# Patient Record
Sex: MALE | Race: WHITE | NOT HISPANIC OR LATINO | Employment: OTHER | URBAN - METROPOLITAN AREA
[De-identification: names, ages, dates, MRNs, and addresses within clinical notes are randomized per-mention and may not be internally consistent; named-entity substitution may affect disease eponyms.]

---

## 2017-01-01 ENCOUNTER — LAB CONVERSION - ENCOUNTER (OUTPATIENT)
Dept: OTHER | Facility: OTHER | Age: 77
End: 2017-01-01

## 2017-01-01 LAB
CULTURE RESULT (HISTORICAL): NORMAL
CULTURE RESULT (HISTORICAL): NORMAL
SHIGA TOXIN TEST (HISTORICAL): NORMAL

## 2017-01-02 ENCOUNTER — GENERIC CONVERSION - ENCOUNTER (OUTPATIENT)
Dept: OTHER | Facility: OTHER | Age: 77
End: 2017-01-02

## 2017-01-11 ENCOUNTER — GENERIC CONVERSION - ENCOUNTER (OUTPATIENT)
Dept: OTHER | Facility: OTHER | Age: 77
End: 2017-01-11

## 2017-03-07 ENCOUNTER — GENERIC CONVERSION - ENCOUNTER (OUTPATIENT)
Dept: OTHER | Facility: OTHER | Age: 77
End: 2017-03-07

## 2017-04-11 ENCOUNTER — GENERIC CONVERSION - ENCOUNTER (OUTPATIENT)
Dept: OTHER | Facility: OTHER | Age: 77
End: 2017-04-11

## 2017-05-16 ENCOUNTER — GENERIC CONVERSION - ENCOUNTER (OUTPATIENT)
Dept: OTHER | Facility: OTHER | Age: 77
End: 2017-05-16

## 2017-06-06 ENCOUNTER — GENERIC CONVERSION - ENCOUNTER (OUTPATIENT)
Dept: OTHER | Facility: OTHER | Age: 77
End: 2017-06-06

## 2017-09-28 ENCOUNTER — GENERIC CONVERSION - ENCOUNTER (OUTPATIENT)
Dept: OTHER | Facility: OTHER | Age: 77
End: 2017-09-28

## 2017-10-19 ENCOUNTER — ALLSCRIPTS OFFICE VISIT (OUTPATIENT)
Dept: OTHER | Facility: OTHER | Age: 77
End: 2017-10-19

## 2017-10-20 NOTE — PROGRESS NOTES
Assessment  1  Tick bite (919 4,E906 4) (W57 XXXA)    Plan  Impetigo    · Doxycycline Hyclate 100 MG Oral Capsule; 1 BID    Discussion/Summary    Told patient to wait regarding antibiotics and observe area  Educated patient on signs to return to office including fever, arthralgias  Chief Complaint  tick bite 2 days ago      History of Present Illness  HPI: 78year old male presents to office complaining of tick bite to left calf just under knee  States that he noticed tick bite 2 days ago just after he realized he had a rash and itching  Denies fever, arthralgias  Has been applying Bactroban to area  Review of Systems    Constitutional: no fever or chills, feels well, no tiredness, no recent weight loss or weight gain  Cardiovascular: no complaints of slow or fast heart rate, no chest pain, no palpitations, no leg claudication or lower extremity edema  Respiratory: no complaints of shortness of breath, no wheezing or cough, no dyspnea on exertion, no orthopnea or PND  Gastrointestinal: no complaints of abdominal pain, no constipation, no nausea or vomiting, no diarrhea or bloody stools  Musculoskeletal: no complaints of arthralgia, no myalgia, no joint swelling or stiffness, no limb pain or swelling  Integumentary: as noted in HPI  Neurological: no complaints of headache, no confusion, no numbness or tingling, no dizziness or fainting  Active Problems  1  Back pain (724 5) (M54 9)   2  Greater trochanteric bursitis of left hip (726 5) (M70 62)   3  Hypertension (401 9) (I10)   4  Impetigo (684) (L01 00)   5  Infectious enteritis, unspecified infectious agent (009 0) (A09)   6  Mixed hyperlipidemia (272 2) (E78 2)   7  Numbness and tingling of leg (782 0) (R20 0,R20 2)   8  Screening for AAA (abdominal aortic aneurysm) (V81 2) (Z13 6)   9  Screening PSA (prostate specific antigen) (V76 44) (Z12 5)   10   Swimmer's ear of both sides, unspecified chronicity (380 12) (H60 333)    Past Medical History  1  History of Acute medial meniscal tear, left, initial encounter (836 0) (S83 242A)   2  History of Acute medial meniscal tear, left, subsequent encounter (V58 89,836 0)   (S83 242D)   3  History of Aftercare following surgery of the musculoskeletal system (V58 78) (Z47 89)   4  History of Effusion of knee joint, left (719 06) (M25 462)   5  H/O prostate cancer (V10 46) (Z85 46)   6  Hypertension (401 9) (I10)   7  History of Knee pain, left (719 46) (M25 562)   8  History of Myalgia and myositis (729 1)   9  History of Numbness of left lower extremity (782 0) (R20 0)   10  History of Preoperative clearance (V72 84) (Z01 818)   11  History of Strain of neck muscle, initial encounter (847 0) (S16 1XXA)  Active Problems And Past Medical History Reviewed: The active problems and past medical history were reviewed and updated today  Family History  Father    1  Family history of Coronary artery disease involving native coronary artery of native heart,   angina presence unspecified  Family History    2  Denied: Family history of substance abuse   3  No family history of mental disorder    Social History   · Former smoker (J73 58) (Q74 880)   · Quit 35+ years ago    Surgical History  1  History of Bladder Surgery   2  History of Cholecystectomy   3  History of Prostatectomy Radical    Current Meds   1  Advil 200 MG Oral Tablet; Take 1-2 Tablet(s) every 4-6 hours as needed; Therapy: 81Big2014 to (Last Rx:17Lsj3557)  Requested for: 27JNH2789 Ordered   2  Aspirin 81 MG TABS; Therapy: (Recorded:96Phe2711) to Recorded   3  Cephalexin 500 MG Oral Capsule; TAKE 1 CAPSULE 3 TIMES DAILY; Therapy: 71UKY8582 to (21 )  Requested for: 45CJO0265; Last   Rx:41Izj1195 Ordered   4  CoQ10 CAPS; Therapy: (Recorded:56Jbr6756) to Recorded   5  Fish Oil OIL; Therapy: (Recorded:53Ymk4449) to Recorded   6  Lisinopril 5 MG Oral Tablet; TAKE 1 TABLET DAILY AS DIRECTED;    Therapy: 08RTA5869 to (Evaluate:88Trj3775)  Requested for: 33NHC8157; Last   Rx:53Jkc0161 Ordered   7  Mupirocin Calcium 2 % External Cream; APPLY THIN LAYER TO AFFECTED AREA(S) 3   TIMES DAILY; Therapy: 26IED2304 to (Last Burnie Costain)  Requested for: 25PHK7444 Ordered   8  Neomycin-Polymyxin-HC 1 % Otic Solution; INSTILL 3 DROPS IN AFFECTED EAR(S)   3-4 TIMES DAILY; Therapy: 37JSB1807 to (Last Burnie Costain)  Requested for: 74VXJ2762 Ordered   9  Pantethine Plus TABS; Therapy: (Recorded:15Isq0458) to Recorded   10  Probiotic Product TABS; Therapy: (Recorded:27Nov2015) to Recorded   11  Red Yeast Rice CAPS; Therapy: (Recorded:27Nov2015) to Recorded   12  Super B-50 TABS; Therapy: (Recorded:27Nov2015) to Recorded    Allergies  1  No Known Drug Allergies    Vitals   Recorded: 71UVZ0946 08:48AM   Temperature 98 1 F   Heart Rate 54   Respiration 16   Systolic 916   Diastolic 80   Height 5 ft 9 in   Weight 236 lb    BMI Calculated 34 85   BSA Calculated 2 21   O2 Saturation 97     Physical Exam    Constitutional   General appearance: No acute distress, well appearing and well nourished  Pulmonary   Respiratory effort: No increased work of breathing or signs of respiratory distress  Auscultation of lungs: Clear to auscultation, equal breath sounds bilaterally, no wheezes, no rales, no rhonci  Cardiovascular   Auscultation of heart: Normal rate and rhythm, normal S1 and S2, without murmurs  Examination of extremities for edema and/or varicosities: Normal     Musculoskeletal   Gait and station: Normal     Skin   Skin and subcutaneous tissue: Abnormal  -- 1 cm circular area of erythema with central punctum from tick bite  Results/Data  PHQ-2 Adult Depression Screening 19Oct2017 09:12AM User, Ahs     Test Name Result Flag Reference   PHQ-2 Adult Depression Score 0     Over the last two weeks, how often have you been bothered by any of the following problems?   Little interest or pleasure in doing things: Not at all - 0  Feeling down, depressed, or hopeless: Not at all - 0   PHQ-2 Adult Depression Screening Negative         Future Appointments    Date/Time Provider Specialty Site   04/16/2018 09:15 AM Wild Vail MD Urology [address]     Signatures   Electronically signed by : Yolanda aFlcon MD; Oct 19 2017  9:21AM EST                       (Author)

## 2017-12-13 ENCOUNTER — ALLSCRIPTS OFFICE VISIT (OUTPATIENT)
Dept: OTHER | Facility: OTHER | Age: 77
End: 2017-12-13

## 2017-12-21 ENCOUNTER — ALLSCRIPTS OFFICE VISIT (OUTPATIENT)
Dept: OTHER | Facility: OTHER | Age: 77
End: 2017-12-21

## 2017-12-22 NOTE — PROGRESS NOTES
Assessment   1  Acute otitis media (382 9) (H66 90)    Plan   Acute otitis media    · Amoxicillin 500 MG Oral Capsule; TAKE 1 CAPSULE 3 TIMES DAILY UNTIL GONE  PMH: Strain of neck muscle, initial encounter    · Advil 200 MG Oral Tablet  Unlinked    · CoQ10 CAPS   · Fish Oil OIL   · Pantethine Plus TABS   · Red Yeast Rice CAPS   · Super B-50 TABS    Discussion/Summary      Fungus flushed--OE/OM---amox + tamar otic--f/u if cont  Chief Complaint   left ear pain/ trouble hearing--was give RX for drops, started yesterday--pt states eat feels clogged--had dental implants x 1week ago      History of Present Illness   HPI: see cc      Review of Systems        Constitutional: no fever or chills, feels well, no tiredness, no recent weight loss or weight gain  ENT: as noted in HPI  Cardiovascular: no complaints of slow or fast heart rate, no chest pain, no palpitations, no leg claudication or lower extremity edema  Respiratory: no complaints of shortness of breath, no wheezing or cough, no dyspnea on exertion, no orthopnea or PND  Active Problems   1  Back pain (724 5) (M54 9)   2  Hypertension (401 9) (I10)   3  Mixed hyperlipidemia (272 2) (E78 2)   4  Need for vaccination (V05 9) (Z23)   5  Screening for AAA (abdominal aortic aneurysm) (V81 2) (Z13 6)   6  Tick bite (919 4,E906 4) (W57 XXXA)   7  Urinary incontinence with continuous leakage (788 37) (N39 45)    Past Medical History   1  History of Acute medial meniscal tear, left, initial encounter (836 0) (S83 242A)   2  History of Acute medial meniscal tear, left, subsequent encounter (V58 89,836 0)     (S83 242D)   3  History of Aftercare following surgery of the musculoskeletal system (V58 78) (Z47 89)   4  History of Effusion of knee joint, left (719 06) (M25 462)   5  H/O prostate cancer (V10 46) (Z85 46)   6  Hypertension (401 9) (I10)   7  History of Knee pain, left (719 46) (M25 562)   8  History of Myalgia and myositis (729 1)   9   History of Numbness of left lower extremity (782 0) (R20 0)   10  History of Preoperative clearance (V72 84) (Z01 818)   11  History of Strain of neck muscle, initial encounter (847 0) (S16 1XXA)  Active Problems And Past Medical History Reviewed: The active problems and past medical history were reviewed and updated today  Family History   Father    1  Family history of Coronary artery disease involving native coronary artery of native heart,     angina presence unspecified  Family History    2  Denied: Family history of substance abuse   3  No family history of mental disorder    Social History    · Former smoker (U23 47) (N66 316)   · Quit 35+ years ago    Surgical History   1  History of Bladder Surgery   2  History of Cholecystectomy   3  History of Prostatectomy Radical    Current Meds    1  Advil 200 MG Oral Tablet; Take 1-2 Tablet(s) every 4-6 hours as needed; Therapy: 76Dof1009 to (Last Rx:74Tfx0673)  Requested for: 64BQM4995 Ordered   2  Aspirin 81 MG TABS; Therapy: (Recorded:40Gre4528) to Recorded   3  CoQ10 CAPS; Therapy: (Recorded:27Nov2015) to Recorded   4  Fish Oil OIL; Therapy: (Recorded:76Wne6935) to Recorded   5  Lisinopril 5 MG Oral Tablet; TAKE 1 TABLET DAILY AS DIRECTED; Therapy: 63IHP4307 to (Evaluate:28Axc7771)  Requested for: 93DTU0198; Last     Rx:64Eeg4951 Ordered   6  Mupirocin Calcium 2 % External Cream; APPLY THIN LAYER TO AFFECTED AREA(S) 3     TIMES DAILY; Therapy: 10HOS8526 to (Last Agee [place]es Run)  Requested for: 02PTD2843 Ordered   7  Neomycin-Polymyxin-HC 1 % Otic Solution; INSTILL 3 DROPS IN AFFECTED EAR(S)     3-4 TIMES DAILY; Therapy: 84FET0194 to (Last Agee [place]es [place])  Requested for: 09DFE7452 Ordered   8  Pantethine Plus TABS; Therapy: (Recorded:41Vbh6709) to Recorded   9  Probiotic Product TABS; Therapy: (Recorded:27Nov2015) to Recorded   10  Red Yeast Rice CAPS; Therapy: (Recorded:27Nov2015) to Recorded   11   Super B-50 TABS; Therapy: (Recorded:59Gny4950) to Recorded    Allergies   1  No Known Drug Allergies    Vitals    Recorded: 34Zoj2749 09:44AM   Heart Rate 70   Systolic 355   Diastolic 84   Height 5 ft 9 in   Weight 242 lb    BMI Calculated 35 74   BSA Calculated 2 24   O2 Saturation 97     Physical Exam        Constitutional      General appearance: No acute distress, well appearing and well nourished  Ears, Nose, Mouth, and Throat      External inspection of ears and nose: Normal        Otoscopic examination: Abnormal  -- L canal w/ aspergillosis/OM L  Nasal mucosa, septum, and turbinates: Normal without edema or erythema            Future Appointments      Date/Time Provider Specialty Site   04/16/2018 09:15 AM Mk Villeda MD Urology [address]     Signatures    Electronically signed by : Rishi Wynne MD; Dec 21 2017 10:19AM EST                       (Author)

## 2018-01-12 ENCOUNTER — ALLSCRIPTS OFFICE VISIT (OUTPATIENT)
Dept: OTHER | Facility: OTHER | Age: 78
End: 2018-01-12

## 2018-01-12 VITALS
TEMPERATURE: 98.1 F | OXYGEN SATURATION: 97 % | RESPIRATION RATE: 16 BRPM | SYSTOLIC BLOOD PRESSURE: 120 MMHG | WEIGHT: 236 LBS | DIASTOLIC BLOOD PRESSURE: 80 MMHG | BODY MASS INDEX: 34.96 KG/M2 | HEART RATE: 54 BPM | HEIGHT: 69 IN

## 2018-01-13 NOTE — RESULT NOTES
Verified Results  (1) COMPREHENSIVE METABOLIC PANEL 28ZBD0560 84:78XG Traneric Victor Manuel   REPORT COMMENT:  PATIENT REFUSED SOME TESTING; PATIENT ENCOURAGED TO RETURN  Test Name Result Flag Reference   GLUCOSE 95 mg/dL  65-99   Fasting reference interval   UREA NITROGEN (BUN) 20 mg/dL  7-25   CREATININE 0 92 mg/dL  0 70-1 18   For patients >52years of age, the reference limit  for Creatinine is approximately 13% higher for people  identified as -American  eGFR NON-AFR  AMERICAN 82 mL/min/1 73m2  > OR = 60   eGFR AFRICAN AMERICAN 95 mL/min/1 73m2  > OR = 60   BUN/CREATININE RATIO   1-92   NOT APPLICABLE (calc)   SODIUM 138 mmol/L  135-146   POTASSIUM 4 2 mmol/L  3 5-5 3   CHLORIDE 107 mmol/L     CARBON DIOXIDE 26 mmol/L  20-31   CALCIUM 9 1 mg/dL  8 6-10 3   PROTEIN, TOTAL 6 1 g/dL  6 1-8 1   ALBUMIN 4 0 g/dL  3 6-5 1   GLOBULIN 2 1 g/dL (calc)  1 9-3 7   ALBUMIN/GLOBULIN RATIO 1 9 (calc)  1 0-2 5   BILIRUBIN, TOTAL 0 6 mg/dL  0 2-1 2   ALKALINE PHOSPHATASE 58 U/L     AST 17 U/L  10-35   ALT 24 U/L  9-46     (Q) LIPID PANEL WITH REFLEX TO DIRECT LDL 39HBG6520 09:55AM Traneric Rush     Test Name Result Flag Reference   CHOLESTEROL, TOTAL 200 mg/dL  125-200   HDL CHOLESTEROL 44 mg/dL  > OR = 40   TRIGLICERIDES 225 mg/dL  <150   LDL-CHOLESTEROL 132 mg/dL (calc) H <130   Desirable range <100 mg/dL for patients with CHD or  diabetes and <70 mg/dL for diabetic patients with  known heart disease  CHOL/HDLC RATIO 4 5 (calc)  < OR = 5 0   NON HDL CHOLESTEROL 156 mg/dL (calc)     Target for non-HDL cholesterol is 30 mg/dL higher than   LDL cholesterol target

## 2018-01-13 NOTE — PROGRESS NOTES
Assessment   1  Chronic congestion of paranasal sinus (771 9) (J32 9)    Plan   Chronic congestion of paranasal sinus    · Fluticasone Propionate 50 MCG/ACT Nasal Suspension; USE 1 SPRAY IN EACH    NOSTRIL ONCE DAILY    Discussion/Summary      Start Flonase as directed  Use Ocean Nasal Rinse 3x day as discussed  Call if Symptoms worsen  Call or return with any problems or concerns  Possible side effects of new medications were reviewed with the patient/guardian today  The treatment plan was reviewed with the patient/guardian  The patient/guardian understands and agrees with the treatment plan      Chief Complaint   ears clogged/trouble hearing--both--was seen few weeks ago/given abx      History of Present Illness   HPI: Was seen 3 weeks ago for Left ear pain and clogged  Right ear clogged x 2 days  Review of Systems        Constitutional: no fever or chills, feels well, no tiredness, no recent weight loss or weight gain  ENT: right ear clogged, but-- as noted in HPI  Cardiovascular: no complaints of slow or fast heart rate, no chest pain, no palpitations, no leg claudication or lower extremity edema  Respiratory: no complaints of shortness of breath, no wheezing or cough, no dyspnea on exertion, no orthopnea or PND  Gastrointestinal: no complaints of abdominal pain, no constipation, no nausea or vomiting, no diarrhea or bloody stools  Genitourinary: no complaints of dysuria or incontinence, no hesitancy, no nocturia, no genital lesion, no inadequacy of penile erection  Musculoskeletal: no complaints of arthralgia, no myalgia, no joint swelling or stiffness, no limb pain or swelling  Integumentary: no complaints of skin rash or lesion, no itching or dry skin, no skin wounds  Neurological: no complaints of headache, no confusion, no numbness or tingling, no dizziness or fainting  ROS reviewed  Active Problems   1  Acute otitis media (566 9) (K45 02)   2   Back pain (724 5) (M54 9)   3  Hypertension (401 9) (I10)   4  Mixed hyperlipidemia (272 2) (E78 2)   5  Need for vaccination (V05 9) (Z23)   6  Screening for AAA (abdominal aortic aneurysm) (V81 2) (Z13 6)   7  Tick bite (919 4,E906 4) (W57 XXXA)   8  Urinary incontinence with continuous leakage (788 37) (N39 45)    Past Medical History   1  History of Acute medial meniscal tear, left, initial encounter (836 0) (S83 242A)   2  History of Acute medial meniscal tear, left, subsequent encounter (V58 89,836 0)     (S83 242D)   3  History of Aftercare following surgery of the musculoskeletal system (V58 78) (Z47 89)   4  History of Effusion of knee joint, left (719 06) (M25 462)   5  H/O prostate cancer (V10 46) (Z85 46)   6  Hypertension (401 9) (I10)   7  History of Knee pain, left (719 46) (M25 562)   8  History of Myalgia and myositis (729 1)   9  History of Numbness of left lower extremity (782 0) (R20 0)   10  History of Preoperative clearance (V72 84) (Z01 818)   11  History of Strain of neck muscle, initial encounter (847 0) (S16 1XXA)  Active Problems And Past Medical History Reviewed: The active problems and past medical history were reviewed and updated today  Family History   Father    1  Family history of Coronary artery disease involving native coronary artery of native heart,     angina presence unspecified  Family History    2  Denied: Family history of substance abuse   3  No family history of mental disorder  Family History Reviewed: The family history was reviewed and updated today  Social History    · Former smoker (G52 84) (M92 035)   · Quit 35+ years ago  The social history was reviewed and updated today  Surgical History   1  History of Bladder Surgery   2  History of Cholecystectomy   3  History of Prostatectomy Radical  Surgical History Reviewed: The surgical history was reviewed and updated today  Current Meds    1   Amoxicillin 500 MG Oral Capsule; TAKE 1 CAPSULE 3 TIMES DAILY UNTIL GONE;     Therapy: 94AQY3432 to (Evaluate:26Dtl9770)  Requested for: 90Naf2268; Last     Rx:80Ecs0087 Ordered   2  Aspirin 81 MG TABS; Therapy: (Recorded:47Ayg8648) to Recorded   3  Lisinopril 5 MG Oral Tablet; TAKE 1 TABLET DAILY AS DIRECTED; Therapy: 70DPH4845 to (Evaluate:25Mgu1013)  Requested for: 09KCN2149; Last     Rx:01Pso8535 Ordered   4  Mupirocin Calcium 2 % External Cream; APPLY THIN LAYER TO AFFECTED AREA(S) 3     TIMES DAILY; Therapy: 96FAZ0485 to (Last Salena Dubs)  Requested for: 25JDO7470 Ordered   5  Neomycin-Polymyxin-HC 1 % Otic Solution; INSTILL 3 DROPS IN AFFECTED EAR(S)     3-4 TIMES DAILY; Therapy: 39TMJ2767 to (Last Salena Dubs)  Requested for: 20MAQ0603 Ordered   6  Probiotic Product TABS; Therapy: (Recorded:27Nov2015) to Recorded     The medication list was reviewed and updated today  Allergies   1  No Known Drug Allergies    Vitals    Recorded: 12Jan2018 01:41PM   Heart Rate 74   Systolic 670   Diastolic 70   Height 5 ft 9 in   Weight 242 lb    BMI Calculated 35 74   BSA Calculated 2 24   O2 Saturation 97     Physical Exam        Constitutional      General appearance: No acute distress, well appearing and well nourished  Eyes      Conjunctiva and lids: No swelling, erythema, or discharge  Pupils and irises: Equal, round and reactive to light  Ears, Nose, Mouth, and Throat      External inspection of ears and nose: Normal        Otoscopic examination: Tympanic membrance translucent with normal light reflex  Canals patent without erythema  Nasal mucosa, septum, and turbinates: Normal without edema or erythema  Pulmonary      Respiratory effort: No increased work of breathing or signs of respiratory distress  Auscultation of lungs: Clear to auscultation, equal breath sounds bilaterally, no wheezes, no rales, no rhonci         Cardiovascular      Auscultation of heart: Normal rate and rhythm, normal S1 and S2, without murmurs  Abdomen      Abdomen: Non-tender, no masses  Liver and spleen: No hepatomegaly or splenomegaly  Lymphatic      Palpation of lymph nodes in neck: No lymphadenopathy         Musculoskeletal      Gait and station: Normal        Psychiatric      Orientation to person, place and time: Normal        Mood and affect: Normal           Future Appointments      Date/Time Provider Specialty Site   04/16/2018 09:15 AM Sondra Monreal MD Urology [address]     Signatures    Electronically signed by : Jaime Garcia; Jan 12 2018  2:12PM EST                       (Author)     Electronically signed by : Odell Logan MD; Jan 12 2018  2:44PM EST

## 2018-01-13 NOTE — RESULT NOTES
Message   Recorded as Task   Date: 01/02/2017 09:09 AM, Created By: Breezy Mondragon   Task Name: Call Patient with results   Assigned To: Ashutosh Holloway   Regarding Patient: Stefano Siegel, Status: Active   CommentJeannie Guess - 02 Jan 2017 9:09 AM     Patient Phone: [phone]    Stool samples are all negative for infectious causes  Would be concerned about malabsorption  If diarrhea is still persisting should probably see GI for further evaluation     Sherita Hi - 02 Jan 2017 10:22 AM     TASK EDITED  PT AWARE- GI NUMBER PROVIDED        Signatures   Electronically signed by : Edwin Harrington, ; Jan 2 2017 10:22AM EST                       (Author)

## 2018-01-15 NOTE — RESULT NOTES
Verified Results  CLOSTRIDIUM DIFFICILE TOXIN/GDH W/REFL TO PCR 75QDT3327 10:38AM Jose Jaimes   REPORT COMMENT:  SPLIT 12/28/2016 FROM [id number]  FASTING:UNKNOWN     Test Name Result Flag Reference   CLOSTRIDIUM DIFFICILE TOXIN/GDH W/REFL TO PCR      CLOSTRIDIUM DIFFICILE TOXIN/GDH W/REFL TO PCR         MICRO NUMBER:      [id number]    TEST STATUS:       FINAL    SPECIMEN SOURCE:   STOOL    SPECIMEN QUALITY:  ADEQUATE    GDH ANTIGEN:       Not Detected    TOXIN A AND B:     Not Detected    COMMENT:           No toxigenic C  difficile detected

## 2018-01-18 NOTE — RESULT NOTES
Verified Results  (1) CBC/PLT/DIFF 65OMU2038 01:25PM Jaime Carter   REPORT COMMENT:  FASTING:UNKNOWN  COLLECTION KIT GIVEN TO PATIENT  PATIENT ADVISED TO RETURN       Test Name Result Flag Reference   WHITE BLOOD CELL COUNT 4 3 Thousand/uL  3 8-10 8   RED BLOOD CELL COUNT 4 58 Million/uL  4 20-5 80   HEMOGLOBIN 14 6 g/dL  13 2-17 1   HEMATOCRIT 42 8 %  38 5-50 0   MCV 93 6 fL  80 0-100 0   MCH 32 0 pg  27 0-33 0   MCHC 34 2 g/dL  32 0-36 0   RDW 13 1 %  11 0-15 0   PLATELET COUNT 014 Thousand/uL  140-400   MPV 8 3 fL  7 5-11 5   ABSOLUTE NEUTROPHILS 2606 cells/uL  8137-5983   ABSOLUTE LYMPHOCYTES 1213 cells/uL  850-3900   ABSOLUTE MONOCYTES 391 cells/uL  200-950   ABSOLUTE EOSINOPHILS 73 cells/uL     ABSOLUTE BASOPHILS 17 cells/uL  0-200   NEUTROPHILS 60 6 %     LYMPHOCYTES 28 2 %     MONOCYTES 9 1 %     EOSINOPHILS 1 7 %     BASOPHILS 0 4 %

## 2018-01-22 VITALS
OXYGEN SATURATION: 98 % | BODY MASS INDEX: 34.36 KG/M2 | RESPIRATION RATE: 16 BRPM | HEIGHT: 69 IN | DIASTOLIC BLOOD PRESSURE: 84 MMHG | WEIGHT: 232 LBS | SYSTOLIC BLOOD PRESSURE: 140 MMHG | HEART RATE: 74 BPM

## 2018-01-23 VITALS
BODY MASS INDEX: 35.84 KG/M2 | HEART RATE: 70 BPM | DIASTOLIC BLOOD PRESSURE: 84 MMHG | WEIGHT: 242 LBS | OXYGEN SATURATION: 97 % | SYSTOLIC BLOOD PRESSURE: 134 MMHG | HEIGHT: 69 IN

## 2018-01-23 VITALS
DIASTOLIC BLOOD PRESSURE: 70 MMHG | SYSTOLIC BLOOD PRESSURE: 134 MMHG | HEIGHT: 69 IN | WEIGHT: 242 LBS | OXYGEN SATURATION: 97 % | BODY MASS INDEX: 35.84 KG/M2 | HEART RATE: 74 BPM

## 2018-01-23 VITALS
RESPIRATION RATE: 16 BRPM | OXYGEN SATURATION: 96 % | HEART RATE: 66 BPM | BODY MASS INDEX: 35.84 KG/M2 | SYSTOLIC BLOOD PRESSURE: 130 MMHG | WEIGHT: 242 LBS | HEIGHT: 69 IN | DIASTOLIC BLOOD PRESSURE: 90 MMHG

## 2018-01-23 NOTE — PROGRESS NOTES
Assessment    1  Encounter for preventive health examination (V70 0) (Z00 00)   2  Need for vaccination (V05 9) (Z23)    Plan  Hypertension, Mixed hyperlipidemia    · (1) COMPREHENSIVE METABOLIC PANEL; Status:Active; Requested for:16Imp0535;    · (1) LIPID PANEL FASTING W DIRECT LDL REFLEX; Status:Active; Requested  for:44Seb8497;   Need for vaccination    · Prevnar 13 Intramuscular Suspension    Discussion/Summary    Declines flu shot  Update Advanced Directive- bring copy for office record  Cancer screens are current  Cardiovascular screening and counseling: due for a lipid panel  Diabetes screening and counseling: due for blood glucose  Colorectal cancer screening and counseling: screening is current  Prostate cancer screening and counseling: screening is current  Osteoporosis screening and counseling: screening not indicated  Abdominal aortic aneurysm screening and counseling: the risks and benefits of screening were discussed  Immunizations: the patient declines the influenza vaccination, the lifetime pneumococcal vaccine has been completed, the risks and benefits of the Zostavax vaccine were discussed with the patient and Tdap vaccination status is unknown  Advance Directive Planning: complete and up to date  Advice and education were given regarding nutrition (non-diabetic) and seat belt use  He was referred to urology  Patient Discussion: plan discussed with the patient, follow-up visit needed in one year  Chief Complaint  annual wellness    History of Present Illness  The patient is being seen for the subsequent annual wellness visit  Medicare Screening and Risk Factors   Hospitalizations: no previous hospitalizations  Once per lifetime medicare screening tests: AAA screening [place] has not yet been done  Medicare Screening Tests Risk Questions   Abdominal aortic aneurysm risk assessment: none indicated  Osteoporosis risk assessment:  and over 48years of age  Drug and Alcohol Use: The patient is a former cigarette smoker and quit smoking 1974  The patient reports rare alcohol use  He has never used illicit drugs  Diet and Physical Activity: Current diet includes well balanced meals, 1 servings of fruit per day, 1 servings of vegetables per day, 1 servings of meat per day, 1 servings of whole grains per day, 1 servings of dairy products per day and 3 cups of coffee per day  He exercises 3 times per week  Exercise: strength training 3 hours per week  Functional Ability/Level of Safety: Hearing is normal bilaterally  He does not use a hearing aid  The patient is currently able to do activities of daily living without limitations, able to do instrumental activities of daily living without limitations, able to participate in social activities without limitations and able to drive without limitations  Fall risk factors: The patient fell 0 times in the past 12 months  Injury History: polypharmacy and urinary incontinence  Advance Directives: Advance directives: living will, but no durable power of  for health care directives and no advance directives  Co-Managers and Medical Equipment/Suppliers: See Patient Care Team      Patient Care Team    Care Team Member Role Specialty Office Number   Justyna Lindsay MD  Orthopedic Surgery [phone]     Active Problems    1  Back pain (724 5) (M54 9)   2  Hypertension (401 9) (I10)   3  Mixed hyperlipidemia (272 2) (E78 2)   4  Screening for AAA (abdominal aortic aneurysm) (V81 2) (Z13 6)   5  Tick bite (919 4,E906 4) [facility])    Past Medical History    1  History of Acute medial meniscal tear, left, initial encounter (836 0) (S83 242A)   2  History of Acute medial meniscal tear, left, subsequent encounter (V58 89,836 0)   (S83 242D)   3  History of Aftercare following surgery of the musculoskeletal system (V58 78) (Z47 89)   4  History of Effusion of knee joint, left (719 06) (M25 462)   5   H/O prostate cancer (V10 46) (Z85 46)   6  Hypertension (401 9) (I10)   7  History of Knee pain, left (719 46) (M25 562)   8  History of Myalgia and myositis (729 1)   9  History of Numbness of left lower extremity (782 0) (R20 0)   10  History of Preoperative clearance (V72 84) (Z01 818)   11  History of Strain of neck muscle, initial encounter (847 0) (S16 1XXA)    Surgical History    1  History of Bladder Surgery   2  History of Cholecystectomy   3  History of Prostatectomy Radical    Family History  Father    1  Family history of Coronary artery disease involving native coronary artery of native heart,   angina presence unspecified  Family History    2  Denied: Family history of substance abuse   3  No family history of mental disorder    Social History    · Former smoker (E12 13) (P79 005)  The social history was reviewed and updated today  Current Meds   1  Advil 200 MG Oral Tablet; Take 1-2 Tablet(s) every 4-6 hours as needed; Therapy: 26Xjc9563 to (Last Rx:47Kff9045)  Requested for: 30BFG7602 Ordered   2  Aspirin 81 MG TABS; Therapy: (Recorded:50Nfv6325) to Recorded   3  CoQ10 CAPS; Therapy: (Recorded:27Nov2015) to Recorded   4  Fish Oil OIL; Therapy: (Recorded:81Ocq2975) to Recorded   5  Lisinopril 5 MG Oral Tablet; TAKE 1 TABLET DAILY AS DIRECTED; Therapy: 07AWC3217 to (Evaluate:93Pem8833)  Requested for: 13EJZ2128; Last   Rx:45Bqm0415 Ordered   6  Mupirocin Calcium 2 % External Cream; APPLY THIN LAYER TO AFFECTED AREA(S) 3   TIMES DAILY; Therapy: 48VFC9315 to (Last Paula Riggers)  Requested for: 65ZLG8567 Ordered   7  Neomycin-Polymyxin-HC 1 % Otic Solution; INSTILL 3 DROPS IN AFFECTED EAR(S) 3-4   TIMES DAILY; Therapy: 58MAX5162 to (Last Paula Riggers)  Requested for: 19CSN1627 Ordered   8  Pantethine Plus TABS; Therapy: (Recorded:50Rtg7248) to Recorded   9  Probiotic Product TABS; Therapy: (Recorded:27Nov2015) to Recorded   10  Red Yeast Rice CAPS; Therapy: (Recorded:27Nov2015) to Recorded   11  Super B-50 TABS; Therapy: (Recorded:68Fhr3445) to Recorded    The medication list was reviewed and updated today  Allergies    1  No Known Drug Allergies    Immunizations  Influenza --- Margy Bhavani: Temporarily Deferred: Pt refuses, As of: 64LXK9153, Defer for 1 Years   PPSV --- Margy Beckham: 17-May-2007     Vitals  Signs   Recorded: 47Ljj4672 09:04AM   Heart Rate: 66  Respiration: 16  Systolic: 023  Diastolic: 90  Height: 5 ft 9 in  Weight: 242 lb   BMI Calculated: 35 74  BSA Calculated: 2 24  O2 Saturation: 96    Results/Data  PHQ-2 Adult Depression Screening 13Isc3656 10:05AM User, Ahs     Test Name Result Flag Reference   PHQ-2 Adult Depression Score 0     Over the last two weeks, how often have you been bothered by any of the following problems? Little interest or pleasure in doing things: Not at all - 0  Feeling down, depressed, or hopeless: Not at all - 0   PHQ-2 Adult Depression Screening Negative         Health Management  History of Screening PSA (prostate specific antigen)   (1) PSA (SCREEN) (Dx V76 44 Screen for Prostate Cancer); every 1 month; Next Due: 47FZI3621;   Overdue    Future Appointments    Date/Time Provider Specialty Site   04/16/2018 09:15 AM José Miguel Bryson MD Urology [address]     Signatures   Electronically signed by : Ventura López MD; Dec 13 2017 12:04PM EST                       (Author)

## 2018-01-30 DIAGNOSIS — L98.9 SKIN ABNORMALITIES: Primary | ICD-10-CM

## 2018-01-30 RX ORDER — MUPIROCIN CALCIUM 20 MG/G
CREAM TOPICAL 3 TIMES DAILY
COMMUNITY
Start: 2017-09-28 | End: 2018-01-30 | Stop reason: SDUPTHER

## 2018-01-30 RX ORDER — MUPIROCIN CALCIUM 20 MG/G
CREAM TOPICAL 3 TIMES DAILY
Qty: 15 G | Refills: 2 | Status: SHIPPED | OUTPATIENT
Start: 2018-01-30 | End: 2018-03-22 | Stop reason: HOSPADM

## 2018-03-19 DIAGNOSIS — I10 ESSENTIAL HYPERTENSION: Primary | ICD-10-CM

## 2018-03-19 RX ORDER — LISINOPRIL 5 MG/1
1 TABLET ORAL DAILY
COMMUNITY
Start: 2015-09-21 | End: 2018-03-19 | Stop reason: SDUPTHER

## 2018-03-19 RX ORDER — LISINOPRIL 5 MG/1
5 TABLET ORAL DAILY
Qty: 90 TABLET | Refills: 1 | Status: SHIPPED | OUTPATIENT
Start: 2018-03-19 | End: 2018-03-22 | Stop reason: SDUPTHER

## 2018-03-20 LAB
ALBUMIN SERPL-MCNC: 4.3 G/DL (ref 3.6–5.1)
ALBUMIN/GLOB SERPL: 1.9 (CALC) (ref 1–2.5)
ALP SERPL-CCNC: 63 U/L (ref 40–115)
ALT SERPL-CCNC: 22 U/L (ref 9–46)
AST SERPL-CCNC: 17 U/L (ref 10–35)
BILIRUB SERPL-MCNC: 1.1 MG/DL (ref 0.2–1.2)
BUN SERPL-MCNC: 23 MG/DL (ref 7–25)
BUN/CREAT SERPL: NORMAL (CALC) (ref 6–22)
CALCIUM SERPL-MCNC: 9.4 MG/DL (ref 8.6–10.3)
CHLORIDE SERPL-SCNC: 105 MMOL/L (ref 98–110)
CHOLEST SERPL-MCNC: 188 MG/DL
CHOLEST/HDLC SERPL: 4.5 (CALC)
CO2 SERPL-SCNC: 27 MMOL/L (ref 20–31)
CREAT SERPL-MCNC: 1.15 MG/DL (ref 0.7–1.18)
GLOBULIN SER CALC-MCNC: 2.3 G/DL (CALC) (ref 1.9–3.7)
GLUCOSE SERPL-MCNC: 95 MG/DL (ref 65–99)
HDLC SERPL-MCNC: 42 MG/DL
LDLC SERPL CALC-MCNC: 125 MG/DL (CALC)
NONHDLC SERPL-MCNC: 146 MG/DL (CALC)
POTASSIUM SERPL-SCNC: 4.7 MMOL/L (ref 3.5–5.3)
PROT SERPL-MCNC: 6.6 G/DL (ref 6.1–8.1)
SL AMB EGFR AFRICAN AMERICAN: 71 ML/MIN/1.73M2
SL AMB EGFR NON AFRICAN AMERICAN: 61 ML/MIN/1.73M2
SODIUM SERPL-SCNC: 139 MMOL/L (ref 135–146)
TRIGL SERPL-MCNC: 105 MG/DL

## 2018-03-22 ENCOUNTER — OFFICE VISIT (OUTPATIENT)
Dept: FAMILY MEDICINE CLINIC | Facility: CLINIC | Age: 78
End: 2018-03-22
Payer: MEDICARE

## 2018-03-22 VITALS
HEART RATE: 88 BPM | BODY MASS INDEX: 35.55 KG/M2 | DIASTOLIC BLOOD PRESSURE: 72 MMHG | HEIGHT: 69 IN | RESPIRATION RATE: 18 BRPM | WEIGHT: 240 LBS | SYSTOLIC BLOOD PRESSURE: 118 MMHG

## 2018-03-22 DIAGNOSIS — I10 ESSENTIAL HYPERTENSION: Primary | ICD-10-CM

## 2018-03-22 PROCEDURE — 99213 OFFICE O/P EST LOW 20 MIN: CPT | Performed by: FAMILY MEDICINE

## 2018-03-22 RX ORDER — LISINOPRIL 5 MG/1
5 TABLET ORAL DAILY
Qty: 90 TABLET | Refills: 3 | Status: SHIPPED | OUTPATIENT
Start: 2018-03-22 | End: 2018-12-04 | Stop reason: SDUPTHER

## 2018-03-22 RX ORDER — AMPICILLIN TRIHYDRATE 250 MG
CAPSULE ORAL
COMMUNITY
End: 2021-11-08

## 2018-03-22 RX ORDER — VITAMIN B COMPLEX
TABLET ORAL
COMMUNITY
End: 2021-11-08

## 2018-03-22 NOTE — PROGRESS NOTES
[address] [employer]         NAME: David Whitehead is a 70 y o  male  : 1940    MRN: [id number]  DATE: 2018  TIME: 8:29 AM    Assessment and Plan   Essential hypertension [I10]  1  Essential hypertension           Patient Instructions     Patient Instructions   Same meds          Chief Complaint     Chief Complaint   Patient presents with    Hypertension     REFILL MEDS/REVIEW LABS         History of Present Illness       STABLE htn        Review of Systems   Review of Systems   Constitutional: Negative  Negative for chills, fatigue, fever and unexpected weight change  HENT: Negative  Negative for congestion, ear pain, rhinorrhea, sinus pain and sore throat  Eyes: Negative  Negative for pain, discharge and redness  Respiratory: Negative  Negative for cough, chest tightness, shortness of breath and wheezing  Cardiovascular: Negative  Negative for chest pain, palpitations and leg swelling  Gastrointestinal: Negative for abdominal pain, nausea and vomiting  Endocrine: Negative  Genitourinary: Negative  Negative for dysuria and hematuria  Musculoskeletal: Negative  Negative for arthralgias and myalgias  Allergic/Immunologic: Negative  Neurological: Negative  Negative for dizziness, syncope, speech difficulty, numbness and headaches  Hematological: Negative  Psychiatric/Behavioral: Negative  Negative for agitation, confusion, hallucinations and suicidal ideas           Current Medications       Current Outpatient Prescriptions:     aspirin 81 MG tablet, Take by mouth, Disp: , Rfl:     Coenzyme Q10 (COQ10) 100 MG CAPS, Take by mouth, Disp: , Rfl:     FISH OIL-CANOLA OIL-VIT D3 PO, by Does not apply route, Disp: , Rfl:     lisinopril (ZESTRIL) 5 mg tablet, Take 1 tablet (5 mg total) by mouth daily, Disp: 90 tablet, Rfl: 1    Misc Natural Products (PANTETHINE PLUS) TABS, Take by mouth, Disp: , Rfl:     Red Yeast Rice 600 MG CAPS, Take by mouth, Disp: , Rfl:     Current Allergies     Allergies as of 03/22/2018    (No Known Allergies)            The following portions of the patient's history were reviewed and updated as appropriate: allergies, current medications, past family history, past medical history, past social history, past surgical history and problem list      Past Medical History:   Diagnosis Date    Hypertension     12/13/17    Prostate cancer ([facility] [address] )     12/8/16       Past Surgical History:   Procedure Laterality Date    BLADDER SURGERY      Bladder Neck Restriction, SP Catheter    CHOLECYSTECTOMY      PROSTATECTOMY      Radical       Family History   Problem Relation Age of Onset    Coronary artery disease Father      involving native coronary artery of native heart, angina presence unspecified         Medications have been verified  Objective   /72   Pulse 88   Resp 18   Ht 5' 9" (1 753 m)   Wt 109 kg (240 lb)   BMI 35 44 kg/m²        Physical Exam     Physical Exam   Constitutional: He is oriented to person, place, and time  He appears well-developed and well-nourished  HENT:   Right Ear: Ear canal normal  Tympanic membrane is not injected  Left Ear: Ear canal normal  Tympanic membrane is not injected  Nose: Nose normal    Mouth/Throat: Oropharynx is clear and moist    Eyes: Conjunctivae are normal  Pupils are equal, round, and reactive to light  Neck: Normal range of motion  Neck supple  No thyromegaly present  Cardiovascular: Normal rate, regular rhythm, normal heart sounds and intact distal pulses  Pulmonary/Chest: Effort normal and breath sounds normal  He has no wheezes  Abdominal: Soft  Bowel sounds are normal  There is no tenderness  Musculoskeletal: Normal range of motion  Neurological: He is alert and oriented to person, place, and time  He has normal reflexes  Skin: Skin is warm and dry  Psychiatric: He has a normal mood and affect   His behavior is normal  Judgment and thought content normal

## 2018-04-19 ENCOUNTER — OFFICE VISIT (OUTPATIENT)
Dept: UROLOGY | Facility: MEDICAL CENTER | Age: 78
End: 2018-04-19
Payer: MEDICARE

## 2018-04-19 VITALS
BODY MASS INDEX: 35.25 KG/M2 | HEIGHT: 69 IN | SYSTOLIC BLOOD PRESSURE: 118 MMHG | WEIGHT: 238 LBS | DIASTOLIC BLOOD PRESSURE: 78 MMHG

## 2018-04-19 DIAGNOSIS — Z85.46 PERSONAL HISTORY OF PROSTATE CANCER: Primary | ICD-10-CM

## 2018-04-19 DIAGNOSIS — N39.3 SUI (STRESS URINARY INCONTINENCE), MALE: ICD-10-CM

## 2018-04-19 LAB
SL AMB  POCT GLUCOSE, UA: ABNORMAL
SL AMB LEUKOCYTE ESTERASE,UA: ABNORMAL
SL AMB POCT BILIRUBIN,UA: ABNORMAL
SL AMB POCT BLOOD,UA: ABNORMAL
SL AMB POCT CLARITY,UA: CLEAR
SL AMB POCT COLOR,UA: YELLOW
SL AMB POCT KETONES,UA: ABNORMAL
SL AMB POCT NITRITE,UA: ABNORMAL
SL AMB POCT PH,UA: 5.5
SL AMB POCT SPECIFIC GRAVITY,UA: 1.01
SL AMB POCT URINE PROTEIN: ABNORMAL
SL AMB POCT UROBILINOGEN: 0.2

## 2018-04-19 PROCEDURE — 81003 URINALYSIS AUTO W/O SCOPE: CPT | Performed by: UROLOGY

## 2018-04-19 PROCEDURE — 99214 OFFICE O/P EST MOD 30 MIN: CPT | Performed by: UROLOGY

## 2018-04-19 NOTE — LETTER
April 19, 2018     Tawanna Melo MD  [address] [address]    Patient: Sirisha Garcia   YOB: 1940   Date of Visit: 4/19/2018       Dear Dr Karley Bell: Thank you for referring Perfecto Ye to me for evaluation  Below are my notes for this consultation  If you have questions, please do not hesitate to call me  I look forward to following your patient along with you  Sincerely,        Lindy Shelley MD        CC: No Recipients  Lindy Shelley MD  4/19/2018  1:24 PM  Sign at close encounter  Assessment/Plan:  1  History of adenocarcinoma of the prostate  The patient is approximately 17 years status post radical retropubic prostatectomy and bilateral pelvic lymphadenectomy by Dr Kamlesh Adhikari  The patient had postoperative stress urinary incontinence and erectile failure  He had capsular perforation in his prostate specimen and therefore he received postoperative radiation therapy  His current PSA is low at 0 07 and although not detectable this has not been an issue and certainly asymptomatic and no intervention at this point is indicated  2   Stress urinary incontinence  The patient manages himself utilizing a Cunningham clamp alternating with a [place] catheter  He has failed male sling insertion and has been offered artificial urinary sphincter surgery however he does not wish to go through with that  The patient is happy with his level of continence and management thereof  No problem-specific Assessment & Plan notes found for this encounter  Diagnoses and all orders for this visit:    Personal history of prostate cancer  -     POCT urine dip auto non-scope  -     PSA Total, Diagnostic; Future  -     Comprehensive metabolic panel; Future    AKIRA (stress urinary incontinence), male          Subjective:      Patient ID: Sirisha Garcia is a 70 y o  male      HPI 46-year-old male with a history of stage 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 with radical prostatectomy by Dr Shari Vizcarra 17 years ago  The patient continues to have stress incontinence managed successfully with [facility] clamp and [place] catheters  The patient has erectile failure but this is not an issue  Patient notes that he prefers the [place] catheter because he has much less urinary urgency than with a Cunningham clamp  He denies gross hematuria dysuria  The following portions of the patient's history were reviewed and updated as appropriate: allergies, current medications, past family history, past medical history, past social history, past surgical history and problem list     Review of Systems   Constitutional: Negative  HENT: Negative  Eyes: Negative  Respiratory: Negative  Cardiovascular: Negative  Gastrointestinal: Negative  Endocrine: Negative  Genitourinary: Positive for urgency  Musculoskeletal: Negative  Allergic/Immunologic: Negative  Neurological: Negative  Hematological: Negative  Psychiatric/Behavioral: Negative  Objective:      /78   Ht 5' 9" (1 753 m)   Wt 108 kg (238 lb)   BMI 35 15 kg/m²           Physical Exam   Constitutional: He is oriented to person, place, and time  He appears well-developed and well-nourished  No distress  HENT:   Head: Atraumatic  Eyes: Conjunctivae and EOM are normal    Neck: Neck supple  Pulmonary/Chest: Effort normal    Abdominal: Soft  He exhibits no distension  Genitourinary:   Genitourinary Comments: Phallus normal without cutaneous lesions meatus patent normally placed  Scrotum testes adnexa normal   Perineum normal   SONJA no palpable prostate  Good anal sphincter tone normal appearing anal verge  Neurological: He is alert and oriented to person, place, and time  Psychiatric: He has a normal mood and affect   His behavior is normal  Judgment and thought content normal

## 2018-04-19 NOTE — PROGRESS NOTES
Assessment/Plan:  1  History of adenocarcinoma of the prostate  The patient is approximately 17 years status post radical retropubic prostatectomy and bilateral pelvic lymphadenectomy by Dr Shari Vizcarra  The patient had postoperative stress urinary incontinence and erectile failure  He had capsular perforation in his prostate specimen and therefore he received postoperative radiation therapy  His current PSA is low at 0 07 and although not detectable this has not been an issue and certainly asymptomatic and no intervention at this point is indicated  2   Stress urinary incontinence  The patient manages himself utilizing a Cunningham clamp alternating with a [place] catheter  He has failed male sling insertion and has been offered artificial urinary sphincter surgery however he does not wish to go through with that  The patient is happy with his level of continence and management thereof  No problem-specific Assessment & Plan notes found for this encounter  Diagnoses and all orders for this visit:    Personal history of prostate cancer  -     POCT urine dip auto non-scope  -     PSA Total, Diagnostic; Future  -     Comprehensive metabolic panel; Future    AKIRA (stress urinary incontinence), male          Subjective:      Patient ID: Nury Stanley is a 70 y o  male  HPI 60-year-old male with a history of stage 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 with radical prostatectomy by Dr Shari Vizcarra 17 years ago  The patient continues to have stress incontinence managed successfully with [facility] clamp and [place] catheters  The patient has erectile failure but this is not an issue  Patient notes that he prefers the [place] catheter because he has much less urinary urgency than with a Cunningham clamp  He denies gross hematuria dysuria      The following portions of the patient's history were reviewed and updated as appropriate: allergies, current medications, past family history, past medical history, past social history, past surgical history and problem list     Review of Systems   Constitutional: Negative  HENT: Negative  Eyes: Negative  Respiratory: Negative  Cardiovascular: Negative  Gastrointestinal: Negative  Endocrine: Negative  Genitourinary: Positive for urgency  Musculoskeletal: Negative  Allergic/Immunologic: Negative  Neurological: Negative  Hematological: Negative  Psychiatric/Behavioral: Negative  Objective:      /78   Ht 5' 9" (1 753 m)   Wt 108 kg (238 lb)   BMI 35 15 kg/m²          Physical Exam   Constitutional: He is oriented to person, place, and time  He appears well-developed and well-nourished  No distress  HENT:   Head: Atraumatic  Eyes: Conjunctivae and EOM are normal    Neck: Neck supple  Pulmonary/Chest: Effort normal    Abdominal: Soft  He exhibits no distension  Genitourinary:   Genitourinary Comments: Phallus normal without cutaneous lesions meatus patent normally placed  Scrotum testes adnexa normal   Perineum normal   SONJA no palpable prostate  Good anal sphincter tone normal appearing anal verge  Neurological: He is alert and oriented to person, place, and time  Psychiatric: He has a normal mood and affect   His behavior is normal  Judgment and thought content normal

## 2018-05-29 ENCOUNTER — TELEPHONE (OUTPATIENT)
Dept: UROLOGY | Facility: MEDICAL CENTER | Age: 78
End: 2018-05-29

## 2018-05-29 ENCOUNTER — OFFICE VISIT (OUTPATIENT)
Dept: UROLOGY | Facility: MEDICAL CENTER | Age: 78
End: 2018-05-29
Payer: MEDICARE

## 2018-05-29 VITALS
BODY MASS INDEX: 35.55 KG/M2 | HEIGHT: 69 IN | SYSTOLIC BLOOD PRESSURE: 118 MMHG | DIASTOLIC BLOOD PRESSURE: 72 MMHG | WEIGHT: 240 LBS

## 2018-05-29 DIAGNOSIS — N39.3 STRESS INCONTINENCE: Primary | ICD-10-CM

## 2018-05-29 DIAGNOSIS — R33.9 INCOMPLETE BLADDER EMPTYING: ICD-10-CM

## 2018-05-29 LAB
POST-VOID RESIDUAL VOLUME, ML POC: 98 ML
SL AMB  POCT GLUCOSE, UA: NORMAL
SL AMB LEUKOCYTE ESTERASE,UA: NORMAL
SL AMB POCT BILIRUBIN,UA: NORMAL
SL AMB POCT BLOOD,UA: NORMAL
SL AMB POCT CLARITY,UA: CLEAR
SL AMB POCT COLOR,UA: YELLOW
SL AMB POCT KETONES,UA: NORMAL
SL AMB POCT NITRITE,UA: NORMAL
SL AMB POCT PH,UA: 5
SL AMB POCT SPECIFIC GRAVITY,UA: 1.01
SL AMB POCT URINE PROTEIN: NORMAL
SL AMB POCT UROBILINOGEN: 0.2

## 2018-05-29 PROCEDURE — 51798 US URINE CAPACITY MEASURE: CPT | Performed by: UROLOGY

## 2018-05-29 PROCEDURE — 81003 URINALYSIS AUTO W/O SCOPE: CPT | Performed by: UROLOGY

## 2018-05-29 PROCEDURE — 99214 OFFICE O/P EST MOD 30 MIN: CPT | Performed by: UROLOGY

## 2018-05-29 NOTE — PROGRESS NOTES
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 in 2001  Since surgery the patient has had urinary incontinence and also history of bladder neck contracture  The patient is had a undetectable PSA with no evidence of disease recurrence until 2014 when his PSA became detectable  It has gradually risen to a high of 0 07 as of April 3, 2018 and will continue to be followed on a yearly basis       2   Stress urinary incontinence  The patient continues to use a Cunningham clamp with good results  He recently noted increase in some urinary urgency and leakage and presented questioning the presence of a urinary infection  Urine culture was ordered however urinalysis was completely within normal limits  Voiding pattern will be observed and the patient will undergo cystourethroscopy to evaluate the bladder outlet particularly in view of a previous bladder neck contracture that was treated with laser incision in the past 2     Diagnoses and all orders for this visit:    Stress incontinence  -     POCT Measure PVR  -     POCT urine dip auto non-scope  -     Urine culture; Future    Incomplete bladder emptying  -     Cystoscopy; Future          Subjective:     Patient ID: Taz Watson is a 70 y o  male  HPI 61-year-old male with a history of radical prostatectomy and radiation therapy with a detectable PSA presents with persistent stress urinary incontinence noting that the [facility] clamp does provide control of his incontinence however he is also noted increase in urgency some increase in pelvic discomfort and feeling of potential incomplete bladder emptying  The patient presents questioning whether he has a bladder infection  He notes that he is somewhat better symptomatically today than prior days    He denies dysuria but notes urgency some increased urinary incontinence of the urge type as well as some diminution in the force of his urinary stream   He denies gross hematuria    Review of Systems   Constitutional: Negative  HENT: Positive for ear discharge and ear pain  Eyes: Negative  Respiratory: Negative  Cardiovascular: Negative  Gastrointestinal: Negative  Endocrine: Negative  Genitourinary: Positive for urgency  Musculoskeletal: Negative  Allergic/Immunologic: Negative  Neurological: Negative  Hematological: Negative  Psychiatric/Behavioral: Negative  Objective:     Physical Exam   Constitutional: He is oriented to person, place, and time  He appears well-developed and well-nourished  No distress  HENT:   Head: Normocephalic and atraumatic  Eyes: Conjunctivae and EOM are normal    Neck: Neck supple  Pulmonary/Chest: Effort normal  No respiratory distress  Abdominal: Soft  He exhibits no distension  Genitourinary:   Genitourinary Comments: Phallus normal circumcised without cutaneous lesions  Meatus patent and normally placed  There is leakage of clear urine from the urethral meatus  Scrotum within normal limits with testes bilaterally soft and small  Adnexa normal    Neurological: He is alert and oriented to person, place, and time  Psychiatric: He has a normal mood and affect   His behavior is normal  Judgment and thought content normal

## 2018-05-29 NOTE — PROGRESS NOTES
IPSS Questionnaire (AUA-7): Over the past month    1)  How often have you had a sensation of not emptying your bladder completely after you finish urinating? 1 - Less than 1 time in 5   2)  How often have you had to urinate again less than two hours after you finished urinating? 1 - Less than 1 time in 5   3)  How often have you found you stopped and started again several times when you urinated? 1 - Less than 1 time in 5   4) How difficult have you found it to postpone urination? 3 - About half the time   5) How often have you had a weak urinary stream?  1 - Less than 1 time in 5   6) How often have you had to push or strain to begin urination? 1 - Less than 1 time in 5   7) How many times did you most typically get up to urinate from the time you went to bed until the time you got up in the morning?   4 - 4 times   Total Score:  12

## 2018-05-29 NOTE — LETTER
May 29, 2018     Rishi Wynne MD  [address] [id number]    Patient: Karishma Abdul   YOB: 1940   Date of Visit: 5/29/2018       Dear Dr Alicia Dhaliwal: Thank you for referring Chen Colunga to me for evaluation  Below are my notes for this consultation  If you have questions, please do not hesitate to call me  I look forward to following your patient along with you  Sincerely,        Wandy Zabala MD        CC: No Recipients  Wandy Zabala MD  5/29/2018 12:18 PM  Sign at close encounter  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 in 2001  Since surgery the patient has had urinary incontinence and also history of bladder neck contracture  The patient is had a undetectable PSA with no evidence of disease recurrence until 2014 when his PSA became detectable  It has gradually risen to a high of 0 07 as of April 3, 2018 and will continue to be followed on a yearly basis       2   Stress urinary incontinence  The patient continues to use a Cunningham clamp with good results  He recently noted increase in some urinary urgency and leakage and presented questioning the presence of a urinary infection  Urine culture was ordered however urinalysis was completely within normal limits  Voiding pattern will be observed and the patient will undergo cystourethroscopy to evaluate the bladder outlet particularly in view of a previous bladder neck contracture that was treated with laser incision in the past 2     Diagnoses and all orders for this visit:    Stress incontinence  -     POCT Measure PVR  -     POCT urine dip auto non-scope  -     Urine culture; Future    Incomplete bladder emptying  -     Cystoscopy; Future          Subjective:     Patient ID: Karishma Abdul is a 70 y o  male     HPI 65-year-old male with a history of radical prostatectomy and radiation therapy with a detectable PSA presents with persistent stress urinary incontinence noting that the [facility] clamp does provide control of his incontinence however he is also noted increase in urgency some increase in pelvic discomfort and feeling of potential incomplete bladder emptying  The patient presents questioning whether he has a bladder infection  He notes that he is somewhat better symptomatically today than prior days  He denies dysuria but notes urgency some increased urinary incontinence of the urge type as well as some diminution in the force of his urinary stream   He denies gross hematuria    Review of Systems   Constitutional: Negative  HENT: Positive for ear discharge and ear pain  Eyes: Negative  Respiratory: Negative  Cardiovascular: Negative  Gastrointestinal: Negative  Endocrine: Negative  Genitourinary: Positive for urgency  Musculoskeletal: Negative  Allergic/Immunologic: Negative  Neurological: Negative  Hematological: Negative  Psychiatric/Behavioral: Negative  Objective:     Physical Exam   Constitutional: He is oriented to person, place, and time  He appears well-developed and well-nourished  No distress  HENT:   Head: Normocephalic and atraumatic  Eyes: Conjunctivae and EOM are normal    Neck: Neck supple  Pulmonary/Chest: Effort normal  No respiratory distress  Abdominal: Soft  He exhibits no distension  Genitourinary:   Genitourinary Comments: Phallus normal circumcised without cutaneous lesions  Meatus patent and normally placed  There is leakage of clear urine from the urethral meatus  Scrotum within normal limits with testes bilaterally soft and small  Adnexa normal    Neurological: He is alert and oriented to person, place, and time  Psychiatric: He has a normal mood and affect   His behavior is normal  Judgment and thought content normal

## 2018-05-29 NOTE — TELEPHONE ENCOUNTER
Pt stated he thought he was getting UTI, increased fluids, started cranberry  Then went into "peeing frenzy" Now stream slowing down  Appt today at 11:30 with Dr Priscilla Campos

## 2018-06-21 ENCOUNTER — PROCEDURE VISIT (OUTPATIENT)
Dept: UROLOGY | Facility: MEDICAL CENTER | Age: 78
End: 2018-06-21
Payer: MEDICARE

## 2018-06-21 VITALS
BODY MASS INDEX: 35.55 KG/M2 | SYSTOLIC BLOOD PRESSURE: 140 MMHG | DIASTOLIC BLOOD PRESSURE: 70 MMHG | WEIGHT: 240 LBS | HEIGHT: 69 IN

## 2018-06-21 DIAGNOSIS — N39.3 STRESS INCONTINENCE: ICD-10-CM

## 2018-06-21 DIAGNOSIS — Z85.46 PERSONAL HISTORY OF PROSTATE CANCER: Primary | ICD-10-CM

## 2018-06-21 DIAGNOSIS — N32.0 BLADDER NECK CONTRACTURE: ICD-10-CM

## 2018-06-21 LAB
SL AMB  POCT GLUCOSE, UA: ABNORMAL
SL AMB LEUKOCYTE ESTERASE,UA: ABNORMAL
SL AMB POCT BILIRUBIN,UA: ABNORMAL
SL AMB POCT BLOOD,UA: ABNORMAL
SL AMB POCT CLARITY,UA: CLEAR
SL AMB POCT COLOR,UA: YELLOW
SL AMB POCT KETONES,UA: ABNORMAL
SL AMB POCT NITRITE,UA: ABNORMAL
SL AMB POCT PH,UA: 5
SL AMB POCT SPECIFIC GRAVITY,UA: 1.03
SL AMB POCT URINE PROTEIN: ABNORMAL
SL AMB POCT UROBILINOGEN: 0.2

## 2018-06-21 PROCEDURE — 52000 CYSTOURETHROSCOPY: CPT | Performed by: UROLOGY

## 2018-06-21 PROCEDURE — 99214 OFFICE O/P EST MOD 30 MIN: CPT | Performed by: UROLOGY

## 2018-06-21 PROCEDURE — 81003 URINALYSIS AUTO W/O SCOPE: CPT | Performed by: UROLOGY

## 2018-06-21 NOTE — PROGRESS NOTES
Assessment/Plan:   1  History of adenocarcinoma of the prostate  The patient does have a detectable PSA however it is stable and minimal   The patient is 70years of age and will be watched with yearly PSAs  The PSA should start to rise consideration towards androgen deprivation will be made  2   Stress urinary incontinence  The patient has significant stress incontinence and utilizes a Cunningham clamp as well as a condom catheter for control of his incontinence  We had a long discussion concerning artificial urinary sphincter surgery  The patient is aware of the possibility of mechanical failure erosion infection need for additional revision surgery or explantation  Patient does aware that urinary retention may be a result of sphincter surgery although he stands a good chance of improving or obtaining a high degree of continence  Patient is still unsure as to whether he wishes to proceed with artificial urinary sphincter but will consider it  3   Bladder neck contracture  The patient has a rigid bladder neck but the contracture allows an 18 [place]amanian flexible cystoscope through the bladder neck contracture  No intervention is recommended as the patient is emptying his bladder and has an adequate stream   His urinary urgency is markedly improved  Diagnoses and all orders for this visit:    Personal history of prostate cancer  -     PSA Total, Diagnostic; Future  -     Comprehensive metabolic panel; Future  -     Testosterone; Future  -     POCT urine dip auto non-scope    Urinary urgency    Stress incontinence          Subjective:     Patient ID: Raquel Yan is a 70 y o  male  HPI 80-year-old male with history of radical prostatectomy with a bladder neck contracture and now a detectable PSA which has been very low and stable for many years  The patient has chronic severe stress urinary incontinence due to a rigid bladder outlet and is considering artificial urinary sphincter surgery    He currently maintains his continence utilizing a Cunningham clamp and condom catheter  The patient recently noted a increase in urgency however now notes that that has resolved  He denies any gross hematuria or dysuria  The patient presents for cystoscopy for evaluation of the bladder neck contracture  Review of Systems   Constitutional: Negative  HENT: Negative  Respiratory: Negative  Cardiovascular: Negative  Gastrointestinal: Negative  Genitourinary: Positive for urgency  Neurological: Negative  Psychiatric/Behavioral: Negative  Objective:     Physical Exam   Constitutional: He is oriented to person, place, and time  He appears well-nourished  No distress  HENT:   Head: Atraumatic  Eyes: EOM are normal    Neck: Neck supple  Pulmonary/Chest: Effort normal  No respiratory distress  Abdominal: Soft  He exhibits no distension  There is no tenderness  Genitourinary: Penis normal    Neurological: He is alert and oriented to person, place, and time  Psychiatric: He has a normal mood and affect  His behavior is normal  Judgment and thought content normal    Vitals reviewed  Cystoscopy  Date/Time: 6/21/2018 9:05 AM  Performed by: Juliette Rodríguez  Authorized by: Juliette Rodríguez     Procedure details: cystoscopy    Patient tolerance: Patient tolerated the procedure well with no immediate complications    Additional Procedure Details: The patient was placed in the male lithotomy position in the urethra prepped using Betadine  2% lidocaine lubricant was injected and left indwelling in the urethra lumen for 2 min  Flexible cystoscopy revealed a normal anterior urethra without stricture lesion  The prostate was absent consistent with previous prostatectomy  The bladder neck was somewhat contracted fibrous and noncompliant  It measured approximately 25 Sudanese as the cystoscope was able to traverse the bladder neck with some difficulty    Bladder was mildly trabeculated without intrinsic lesion or evidence of extrinsic mass compression  Ureteral orifices appeared normal in position and configuration with clear efflux bilaterally  At the end of the procedure all equipment was removed from the patient  There were no complications  The patient in short has a patent but contracted fibrous bladder outlet consistent with his history of significant stress urinary incontinence

## 2018-06-21 NOTE — LETTER
June 21, 2018     Maryjane Murphy MD  [address] [facility] [phone] [id number]    Patient: Paul Parks   YOB: 1940   Date of Visit: 6/21/2018       Dear Dr Sierra May: Thank you for referring Lorin Vincent to me for evaluation  Below are my notes for this consultation  If you have questions, please do not hesitate to call me  I look forward to following your patient along with you  Sincerely,        Myrtis Shone, MD        CC: No Recipients  Myrtis Shone, MD  6/21/2018  9:06 AM  Sign at close encounter  Assessment/Plan:   1  History of adenocarcinoma of the prostate  The patient does have a detectable PSA however it is stable and minimal   The patient is 70years of age and will be watched with yearly PSAs  The PSA should start to rise consideration towards androgen deprivation will be made  2   Stress urinary incontinence  The patient has significant stress incontinence and utilizes a Cunningham clamp as well as a condom catheter for control of his incontinence  We had a long discussion concerning artificial urinary sphincter surgery  The patient is aware of the possibility of mechanical failure erosion infection need for additional revision surgery or explantation  Patient does aware that urinary retention may be a result of sphincter surgery although he stands a good chance of improving or obtaining a high degree of continence  Patient is still unsure as to whether he wishes to proceed with artificial urinary sphincter but will consider it  3   Bladder neck contracture  The patient has a rigid bladder neck but the contracture allows an 18 Ecuadorean flexible cystoscope through the bladder neck contracture  No intervention is recommended as the patient is emptying his bladder and has an adequate stream   His urinary urgency is markedly improved       Diagnoses and all orders for this visit:    Personal history of prostate cancer  -     PSA Total, Diagnostic; Future  -     Comprehensive metabolic panel; Future  -     Testosterone; Future  -     POCT urine dip auto non-scope    Urinary urgency    Stress incontinence          Subjective:     Patient ID: Tyra Severin is a 70 y o  male  HPI 16-year-old male with history of radical prostatectomy with a bladder neck contracture and now a detectable PSA which has been very low and stable for many years  The patient has chronic severe stress urinary incontinence due to a rigid bladder outlet and is considering artificial urinary sphincter surgery  He currently maintains his continence utilizing a Cunningham clamp and condom catheter  The patient recently noted a increase in urgency however now notes that that has resolved  He denies any gross hematuria or dysuria  The patient presents for cystoscopy for evaluation of the bladder neck contracture  Review of Systems   Constitutional: Negative  HENT: Negative  Respiratory: Negative  Cardiovascular: Negative  Gastrointestinal: Negative  Genitourinary: Positive for urgency  Neurological: Negative  Psychiatric/Behavioral: Negative  Objective:     Physical Exam   Constitutional: He is oriented to person, place, and time  He appears well-nourished  No distress  HENT:   Head: Atraumatic  Eyes: EOM are normal    Neck: Neck supple  Pulmonary/Chest: Effort normal  No respiratory distress  Abdominal: Soft  He exhibits no distension  There is no tenderness  Genitourinary: Penis normal    Neurological: He is alert and oriented to person, place, and time  Psychiatric: He has a normal mood and affect  His behavior is normal  Judgment and thought content normal    Vitals reviewed          Cystoscopy  Date/Time: 6/21/2018 9:05 AM  Performed by: Oksana Ortiz  Authorized by: Oksana Ortiz     Procedure details: cystoscopy    Patient tolerance: Patient tolerated the procedure well with no immediate complications    Additional Procedure Details: The patient was placed in the male lithotomy position in the urethra prepped using Betadine  2% lidocaine lubricant was injected and left indwelling in the urethra lumen for 2 min  Flexible cystoscopy revealed a normal anterior urethra without stricture lesion  The prostate was absent consistent with previous prostatectomy  The bladder neck was somewhat contracted fibrous and noncompliant  It measured approximately 25 Kazakh as the cystoscope was able to traverse the bladder neck with some difficulty  Bladder was mildly trabeculated without intrinsic lesion or evidence of extrinsic mass compression  Ureteral orifices appeared normal in position and configuration with clear efflux bilaterally  At the end of the procedure all equipment was removed from the patient  There were no complications  The patient in short has a patent but contracted fibrous bladder outlet consistent with his history of significant stress urinary incontinence

## 2018-06-21 NOTE — PROGRESS NOTES
IPSS Questionnaire (AUA-7): Over the past month    1)  How often have you had a sensation of not emptying your bladder completely after you finish urinating? 0 - Not at all   2)  How often have you had to urinate again less than two hours after you finished urinating? 2 - Less than half the time   3)  How often have you found you stopped and started again several times when you urinated? 0 - Not at all   4) How difficult have you found it to postpone urination? 3 - About half the time   5) How often have you had a weak urinary stream?  1 - Less than 1 time in 5   6) How often have you had to push or strain to begin urination? 0 - Not at all   7) How many times did you most typically get up to urinate from the time you went to bed until the time you got up in the morning?   4 - 4 times   Total Score:  10

## 2018-09-02 ENCOUNTER — HOSPITAL ENCOUNTER (EMERGENCY)
Facility: HOSPITAL | Age: 78
Discharge: HOME | End: 2018-09-03
Attending: EMERGENCY MEDICINE | Admitting: EMERGENCY MEDICINE
Payer: MEDICARE

## 2018-09-02 VITALS
HEART RATE: 66 BPM | OXYGEN SATURATION: 98 % | DIASTOLIC BLOOD PRESSURE: 88 MMHG | TEMPERATURE: 98.4 F | RESPIRATION RATE: 18 BRPM | SYSTOLIC BLOOD PRESSURE: 184 MMHG

## 2018-09-02 DIAGNOSIS — R33.9 URINARY RETENTION: Primary | ICD-10-CM

## 2018-09-02 LAB
BACTERIA UR QL AUTO: ABNORMAL /HPF
BILIRUB UR QL STRIP: NEGATIVE
CLARITY UR: ABNORMAL
COLOR UR: YELLOW
GLUCOSE UR STRIP-MCNC: NEGATIVE MG/DL
HGB UR QL STRIP.AUTO: ABNORMAL
KETONES UR STRIP-MCNC: NEGATIVE MG/DL
LEUKOCYTE ESTERASE UR QL STRIP: NEGATIVE
MUCOUS THREADS UR QL AUTO: ABNORMAL
NITRITE UR QL STRIP: NEGATIVE
NON-SQ EPI CELLS URNS QL MICRO: ABNORMAL /HPF
PH UR STRIP.AUTO: 5.5 [PH] (ref 4.5–8)
PROT UR STRIP-MCNC: ABNORMAL MG/DL
RBC #/AREA URNS AUTO: ABNORMAL /HPF
SP GR UR STRIP.AUTO: 1.02 (ref 1–1.03)
UROBILINOGEN UR QL STRIP.AUTO: 0.2 E.U./DL
WBC #/AREA URNS AUTO: ABNORMAL /HPF

## 2018-09-02 PROCEDURE — 81001 URINALYSIS AUTO W/SCOPE: CPT

## 2018-09-02 RX ORDER — LIDOCAINE HYDROCHLORIDE 20 MG/ML
JELLY TOPICAL ONCE
Status: COMPLETED | OUTPATIENT
Start: 2018-09-02 | End: 2018-09-02

## 2018-09-02 RX ADMIN — LIDOCAINE HYDROCHLORIDE: 20 JELLY TOPICAL at 23:23

## 2018-09-03 PROCEDURE — 51798 US URINE CAPACITY MEASURE: CPT

## 2018-09-03 PROCEDURE — 99283 EMERGENCY DEPT VISIT LOW MDM: CPT

## 2018-09-03 NOTE — DISCHARGE INSTRUCTIONS
Urinary Retention in Men   WHAT YOU NEED TO KNOW:   Urinary retention is a condition that develops when your bladder does not empty completely when you urinate  DISCHARGE INSTRUCTIONS:   Medicines:   · Medicines  can help decrease the size of your prostate, fight infection, and help you urinate more easily  · Take your medicine as directed  Contact your healthcare provider if you think your medicine is not helping or if you have side effects  Tell him or her if you are allergic to any medicine  Keep a list of the medicines, vitamins, and herbs you take  Include the amounts, and when and why you take them  Bring the list or the pill bottles to follow-up visits  Carry your medicine list with you in case of an emergency  Barajas catheter care: You may need a Barajas catheter for up to 2 weeks at home  Healthcare providers will give you a smaller leg bag to collect urine  Keep the bag below your waist  This will prevent urine from flowing back into your bladder and causing an infection or other problems  Also, keep the tube free of kinks so the urine will drain properly  Do not pull on the catheter  This can cause pain and bleeding, and may cause the catheter to come out  Ask your healthcare provider or urologist for more information on Barajas catheter care  Urinate regularly:  When your catheter is removed, do not let your bladder become too full before you urinate  Set regular times each day to urinate  Urinate as soon as you feel the need or at least every 3 hours while you are awake  Do not drink liquids before you go to bed  Urinate right before you go to bed  Follow up with your healthcare provider or urologist as directed:  Write down your questions so you remember to ask them during your visits  Contact your healthcare provider or urologist if:   · You have a fever  · You have pain when you urinate  · You have blood in your urine  · You have problems with your catheter      · You have questions or concerns about your condition or care  Return to the emergency department if:   · You have severe abdominal pain  · You are breathing faster than usual     · Your heartbeat is faster than usual     · Your face, hands, feet, or ankles are swollen  © 2017 [address] [employer] Information is for End User's use only and may not be sold, redistributed or otherwise used for commercial purposes  All illustrations and images included in CareNotes® are the copyrighted property of [employer]  or [employer]am[employer]  The above information is an  only  It is not intended as medical advice for individual conditions or treatments  Talk to your doctor, nurse or pharmacist before following any medical regimen to see if it is safe and effective for you  Urinary Retention in Men   WHAT YOU NEED TO KNOW:   What is urinary retention? Urinary retention is a condition that develops when your bladder does not empty completely when you urinate  What causes urinary retention? · An enlarged prostate    · Blockages, such as a stone, growth, or narrowing of your urethra    · A weak bladder muscle    · Nerve damage from diabetes, stroke, or spinal cord injury    · Bladder diverticula, which are pockets of urine that form in your bladder and do not empty    · Certain medicines, such as narcotics, antihistamines, or antidepressants  What are the signs and symptoms of urinary retention? · Frequent urination, or the urge to urinate right after you finish    · An urge to urinate, but your urine does not come out or dribbles out slowly and weakly    · Frequent urine leaks that happen during the day or while you sleep    · Pain or pressure when you urinate    · Pain or stiffness in your abdomen, lower back, hips, or upper thighs    · Blood in your urine  How is urinary retention diagnosed? Your healthcare provider will ask about your health history and the medicines you take   He will press or tap on your lower abdomen  You may need any of the following tests:  · A digital rectal exam  is when healthcare providers carefully feel the size of your prostate  · A post void residual  test will show how much urine is left in your bladder after you urinate  You will be asked to urinate and then healthcare providers will use a small ultrasound machine to check how much urine is left in your bladder  · Blood or urine tests  may show infection or prostate specific antigen (PSA) levels  PSA may be elevated in prostate cancer  · An ultrasound  uses sound waves to show pictures on a monitor  An ultrasound may be done to show bladder stones, infection, or other problems  · A CT scan , or CAT scan, is a type of x-ray that is taken of your prostate, kidneys, and bladder  The pictures may show what is causing your urinary retention  You may be given a dye before the pictures are taken to help healthcare providers see the pictures better  Tell the healthcare provider if you have ever had an allergic reaction to contrast dye  How is urinary retention treated? · A Barajas catheter  is a tube put into your bladder to drain urine into a bag  Keep the bag below your waist  This will prevent urine from flowing back into your bladder and causing an infection or other problems  Also, keep the tube free of kinks so the urine will drain properly  Do not pull on the catheter  This can cause pain and bleeding, and may cause the catheter to come out  · Medicines  can help decrease the size of your prostate, fight infection, and help you urinate more easily  · Surgery  may be needed to treat the condition that is causing your urinary retention  When should I contact my healthcare provider? · You have a fever  · You have pain when you urinate  · You have blood in your urine  · You have problems with your catheter  · You have questions or concerns about your condition or care    When should I seek immediate care or call 911? · You have severe abdominal pain  · You are breathing faster than usual     · Your heartbeat is faster than usual     · Your face, hands, feet, or ankles are swollen  CARE AGREEMENT:   You have the right to help plan your care  Learn about your health condition and how it may be treated  Discuss treatment options with your caregivers to decide what care you want to receive  You always have the right to refuse treatment  The above information is an  only  It is not intended as medical advice for individual conditions or treatments  Talk to your doctor, nurse or pharmacist before following any medical regimen to see if it is safe and effective for you  © 2017 [employer]  Information is for End User's use only and may not be sold, redistributed or otherwise used for commercial purposes  All illustrations and images included in CareNotes® are the copyrighted property of [employer]  or [employer] Burris  Barajas Catheter Placement and Care   WHAT YOU NEED TO KNOW:   A Barajas catheter is a sterile tube that is inserted into your bladder to drain urine  It is also called an indwelling urinary catheter  The tip of the catheter has a small balloon filled with solution that holds the catheter inside your bladder  DISCHARGE INSTRUCTIONS:   Return to the emergency department if:   · Your catheter comes out  · You suddenly have material that looks like sand in the tubing or drainage bag  · No urine is draining into the bag and you have checked the system  · You have pain in your hip, back, pelvis, or lower abdomen  · You are confused or cannot think clearly  Contact your healthcare provider if:   · You have a fever  · You have bladder spasms for more than 1 day after the catheter is placed  · You see blood in the tubing or drainage bag      · You have a rash or itching where the catheter tube is secured to your skin     · Urine leaks from or around the catheter, tubing, or drainage bag  · The closed drainage system has accidently come open or apart  · You see a layer of crystals inside the tubing  · You have questions or concerns about your condition or care  Care for your Barajas catheter:   · Clean your genital area 2 times every day  Clean your catheter and the area around where it was inserted  Use soap and water  Clean your anal opening and catheter area after every bowel movement  · Secure the catheter tube  so you do not pull or move the catheter  This helps prevent pain and bladder spasms  Healthcare providers will show you how to use medical tape or a strap to secure the catheter tube to your body  · Keep a closed drainage system  Your Barajas catheter should always be attached to the drainage bag to form a closed system  Do not disconnect any part of the closed system unless you need to change the bag  Care for your drainage bag:   · Ask if a leg bag is right for you  A leg bag can be worn under your clothes  Ask your healthcare provider for more information about a leg bag  · Keep the drainage bag below the level of your waist   This helps stop urine from moving back up the tubing and into your bladder  Do not loop or kink the tubing  This can cause urine to back up and collect in your bladder  Do not let the drainage bag touch or lie on the floor  · Empty the drainage bag when needed  The weight of a full drainage bag can be painful  Empty the drainage bag every 3 to 6 hours or when it is ? full  · Clean and change the drainage bag as directed  Ask your healthcare provider how often you should change the drainage bag and what cleaning solution to use  Wear disposable gloves when you change the bag  Do not allow the end of the catheter or tubing to touch anything  Clean the ends with an alcohol pad before you reconnect them    What to do if problems develop:   · No urine is draining into the bag:      ¨ Check for kinks in the tubing and straighten them out  ¨ Check the tape or strap used to secure the catheter tube to your skin  Make sure it is not blocking the tube  ¨ Make sure you are not sitting or lying on the tubing  ¨ Make sure the urine bag is hanging below the level of your waist     · Urine leaks from or around the catheter, tubing, or drainage bag:  Check if the closed drainage system has accidently come open or apart  Clean the catheter and tubing ends with a new alcohol pad and reconnect them  Prevent an infection:   · Wash your hands often  Wash before and after you touch your catheter, tubing, or drainage bag  Use soap and water  Wear clean disposable gloves when you care for your catheter or disconnect the drainage bag  Wash your hands before you prepare or eat food  · Drink liquids as directed  Ask your healthcare provider how much liquid to drink each day and which liquids are best for you  Liquids will help flush your kidneys and bladder to help prevent infection  Follow up with your healthcare provider as directed:  Write down your questions so you remember to ask them during your visits  © 2017 [address] [employer] Information is for End User's use only and may not be sold, redistributed or otherwise used for commercial purposes  All illustrations and images included in CareNotes® are the copyrighted property of [employer]  or [employer]amilia Burrsi  The above information is an  only  It is not intended as medical advice for individual conditions or treatments  Talk to your doctor, nurse or pharmacist before following any medical regimen to see if it is safe and effective for you  Urinary Leg Bag   WHAT YOU NEED TO KNOW:   What is a urinary leg bag? A urinary leg bag holds urine that drains from your catheter  It fits under your clothes and allows you to do your normal daily activities    How do I use a urinary leg bag?   · Wash your hands  before and after you touch your catheter, tubing, or drainage bag  Use soap and water  This reduces the risk of infection  · Strap your leg bag to your thigh or calf  Make sure the straps are comfortable  The straps can cause problems with blood flow in your leg if they are too tight  · Clean the tip of the drainage tube with alcohol  before attaching it to your catheter  This helps prevent bacteria from getting into your catheter  · The connecting tube should not pull on your catheter  Skin breakdown can occur if there is constant pulling on the catheter  · Check the tube often to make sure it is not kinked or twisted  Blockage in the tube can cause urine to back up into your bladder  Your urine must flow straight through the tube into your leg bag  · Always keep the leg bag below your bladder  This prevents urine from the bag going back into your bladder, which may cause an infection  · Empty your leg bag when it is ½ full, or every 3 hours  A full bag may break or disconnect from the catheter  · Change to your bedside bag before you go to bed  Your bedside bag can hold more urine  Do not use your leg bag at night because it could become too full or break  · Clean your leg bag after every use  Fill the bag with 2 parts vinegar and 3 parts water  Let it soak for 20 minutes, then rinse and let dry  Follow your healthcare provider's instruction on replacing your leg bag with a new one  CARE AGREEMENT:   You have the right to help plan your care  Learn about your health condition and how it may be treated  Discuss treatment options with your caregivers to decide what care you want to receive  You always have the right to refuse treatment  The above information is an  only  It is not intended as medical advice for individual conditions or treatments   Talk to your doctor, nurse or pharmacist before following any medical regimen to see if it is safe and effective for you  © 2017 [address] [employer] Information is for End User's use only and may not be sold, redistributed or otherwise used for commercial purposes  All illustrations and images included in CareNotes® are the copyrighted property of [employer]  or [employer]

## 2018-09-03 NOTE — ED PROVIDER NOTES
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  Patient has required Barajas catheters before  He states that normally throughout the day he dribbles when he urinates but states that today he has had no urinary output since 5:00 p m  He states that he is having a lot of lower abdominal discomfort at this time which is why he came to the ER  No fevers or chills  He has chronic back pain but nothing that is new or different  History provided by:  Patient   used: No    Difficulty Urinating   Presenting symptoms: dysuria    Presenting symptoms comment:  Difficulty urinating  Relieved by:  Nothing  Ineffective treatments:  None tried  Associated symptoms: abdominal pain and urinary incontinence    Associated symptoms: no fever, no nausea and no vomiting    Risk factors: bladder surgery        Prior to Admission Medications   Prescriptions Last Dose Informant Patient Reported? Taking?    Coenzyme Q10 (COQ10) 100 MG CAPS   Yes No   Sig: Take by mouth   FISH OIL-CANOLA OIL-VIT D3 PO   Yes No   Sig: by Does not apply route   Misc Natural Products (PANTETHINE PLUS) TABS   Yes No   Sig: Take by mouth   Red Yeast Rice 600 MG CAPS   Yes No   Sig: Take by mouth   aspirin 81 MG tablet   Yes No   Sig: Take by mouth   lisinopril (ZESTRIL) 5 mg tablet   No No   Sig: Take 1 tablet (5 mg total) by mouth daily      Facility-Administered Medications: None       Past Medical History:   Diagnosis Date    Bladder neck contracture     ED (erectile dysfunction)     Frequency of urination     Hypercholesterolemia     Hypertension     12/13/17    Nocturia     Prostate cancer ([facility] [address] )     12/8/16    Stress incontinence Past Surgical History:   Procedure Laterality Date    BLADDER SURGERY      Bladder Neck Restriction, SP Catheter    CHOLECYSTECTOMY      CYSTOSTOMY W/ BLADDER DILATION      PROSTATECTOMY      Radical    URETHRAL SLING         Family History   Problem Relation Age of Onset    Coronary artery disease Father         involving native coronary artery of native heart, angina presence unspecified     I have reviewed and agree with the history as documented  Social History   Substance Use Topics    Smoking status: Former Smoker    Smokeless tobacco: Never Used      Comment: quit 35+ years ago    Alcohol use No        Review of Systems   Constitutional: Negative for chills and fever  Gastrointestinal: Positive for abdominal pain  Negative for nausea and vomiting  Genitourinary: Positive for bladder incontinence, decreased urine volume, difficulty urinating, dysuria and urgency  Musculoskeletal: Positive for back pain (Chronic but unchanged)  Negative for myalgias  Skin: Negative for color change and wound  All other systems reviewed and are negative  Physical Exam  Physical Exam   Constitutional: He is oriented to person, place, and time  He appears well-developed and well-nourished  He appears distressed  HENT:   Head: Normocephalic and atraumatic  Eyes: Right eye exhibits no discharge  Left eye exhibits no discharge  Right conjunctiva is not injected  Left conjunctiva is not injected  No scleral icterus  Cardiovascular: Normal rate and intact distal pulses  Pulmonary/Chest: Effort normal  No stridor  No respiratory distress  Abdominal: Soft  There is tenderness in the suprapubic area  There is no guarding and no CVA tenderness  Neurological: He is alert and oriented to person, place, and time  He exhibits normal muscle tone  Skin: Skin is warm and dry  Capillary refill takes less than 2 seconds  No rash noted  He is not diaphoretic  No erythema  No pallor     Psychiatric: He has a normal mood and affect  Nursing note and vitals reviewed        Vital Signs  ED Triage Vitals [09/02/18 2241]   Temperature Pulse Respirations Blood Pressure SpO2   98 4 °F (36 9 °C) 66 18 (!) 184/88 98 %      Temp Source Heart Rate Source Patient Position - Orthostatic VS BP Location FiO2 (%)   Temporal Monitor Sitting Right arm --      Pain Score       No Pain           Vitals:    09/02/18 2241   BP: (!) 184/88   Pulse: 66   Patient Position - Orthostatic VS: Sitting       Visual Acuity      ED Medications  Medications   lidocaine (URO-JET) 2 % topical gel ( Topical Given 9/2/18 2323)       Diagnostic Studies  Results Reviewed     Procedure Component Value Units Date/Time    Urine Microscopic [[id number]]  (Abnormal) Collected:  09/02/18 2341    Lab Status:  Final result Specimen:  Urine from Urine, Other Updated:  09/02/18 2348     RBC, UA 2-4 (A) /hpf      WBC, UA 1-2 (A) /hpf      Epithelial Cells Occasional /hpf      Bacteria, UA Occasional /hpf      MUCOUS THREADS Occasional (A)    POCT urinalysis dipstick [[id number]]  (Abnormal) Resulted:  09/02/18 2335    Lab Status:  Final result Specimen:  Urine Updated:  09/02/18 2335    ED Urine Macroscopic [[id number]]  (Abnormal) Collected:  09/02/18 2341    Lab Status:  Final result Specimen:  Urine Updated:  09/02/18 2332     Color, UA Yellow     Clarity, UA Slightly Cloudy     pH, UA 5 5     Leukocytes, UA Negative     Nitrite, UA Negative     Protein, UA 30 (1+) (A) mg/dl      Glucose, UA Negative mg/dl      Ketones, UA Negative mg/dl      Urobilinogen, UA 0 2 E U /dl      Bilirubin, UA Negative     Blood, UA Trace (A)     Specific [place], UA 1 025    Narrative:       CLINITEK RESULT                 No orders to display              Procedures  Procedures       Phone Contacts  ED Phone Contact    ED Course                               MDM  Number of Diagnoses or Management Options  Urinary retention: new and requires workup  Diagnosis management comments: Patient presents for lower abdominal pain and pressure  He states that his urine has been decreasing throughout the day  He states that he normally dribbles because he has a bladder neck contracture but is now not producing any urine  Patient has a history of adenocarcinoma of the prostate  He has required catheterizations before  Will obtain a bladder scan, insert a urinary catheter for relief, check his urine and reassess  11:42 PM  Patient had over 300 cc of urine in his bladder  Patient was straight cath  He had immediate relief  Will p o  trial the patient and if the patient is able to urinate will discharge home  If not will have an indwelling catheter placed  11:50 PM  Patient is requesting a Barajas catheter  Will insert this, give him a leg bag and have him follow up with Urology this week  Urine shows no signs of infection at this time  Most likely cause of urinary retention is his chronic contracture at the bladder neck  Amount and/or Complexity of Data Reviewed  Clinical lab tests: ordered and reviewed  Tests in the medicine section of CPT®: reviewed and ordered  Review and summarize past medical records: yes      CritCare Time    Disposition  Final diagnoses:   Urinary retention     Time reflects when diagnosis was documented in both MDM as applicable and the Disposition within this note     Time User Action Codes Description Comment    9/2/2018 11:48 PM Ralph Singh Add [R33 9] Urinary retention       ED Disposition     ED Disposition Condition Comment    Discharge  Paul Parks discharge to home/self care      Condition at discharge: Good        Follow-up Information     Follow up With Specialties Details Why Homar Crooks MD Urology Call today To schedule an appointment as soon as you can [address]  [phone]            Patient's Medications   Discharge Prescriptions    No medications on file     No discharge procedures on file      ED Provider  Electronically Signed by           Malachi Estrada DO  09/02/18 8276

## 2018-09-04 ENCOUNTER — TELEPHONE (OUTPATIENT)
Dept: UROLOGY | Facility: AMBULATORY SURGERY CENTER | Age: 78
End: 2018-09-04

## 2018-09-04 NOTE — TELEPHONE ENCOUNTER
Pt was in ER over weekend  300 cc's PVR  Barajas placed  Will forward to Dr Prem Garsia re: voiding trial and F/U appt

## 2018-09-04 NOTE — TELEPHONE ENCOUNTER
Patient called was in the ER over the weekend had catheter placed  He can be reached at [phone]

## 2018-09-05 ENCOUNTER — TELEPHONE (OUTPATIENT)
Dept: UROLOGY | Facility: MEDICAL CENTER | Age: 78
End: 2018-09-05

## 2018-09-05 NOTE — TELEPHONE ENCOUNTER
Spoke to Dr Maria Alejandra Granados who advised for pt to have yi removed 9/6 and to come back for a cystoscopy  Pt notified and made an appt 9/6 for removal and 9/11 for cysto

## 2018-09-06 ENCOUNTER — CLINICAL SUPPORT (OUTPATIENT)
Dept: UROLOGY | Facility: MEDICAL CENTER | Age: 78
End: 2018-09-06
Payer: MEDICARE

## 2018-09-06 VITALS
HEIGHT: 69 IN | SYSTOLIC BLOOD PRESSURE: 160 MMHG | DIASTOLIC BLOOD PRESSURE: 90 MMHG | WEIGHT: 236 LBS | BODY MASS INDEX: 34.96 KG/M2

## 2018-09-06 DIAGNOSIS — R33.9 URINARY RETENTION: Primary | ICD-10-CM

## 2018-09-06 PROCEDURE — 99211 OFF/OP EST MAY X REQ PHY/QHP: CPT

## 2018-09-06 NOTE — PROGRESS NOTES
Patient returns for Barajas removal  Barajas removed without difficulty, patient tolerated procedure well  Urine clear cherie  Denies fever or urinary symptoms  Patient instructed to increase fluids and limit caffeine intake  To return to office if unable to void within 4-6 hours, or has increased discomfort

## 2018-09-07 ENCOUNTER — PROCEDURE VISIT (OUTPATIENT)
Dept: UROLOGY | Facility: MEDICAL CENTER | Age: 78
End: 2018-09-07
Payer: MEDICARE

## 2018-09-07 ENCOUNTER — TELEPHONE (OUTPATIENT)
Dept: UROLOGY | Facility: MEDICAL CENTER | Age: 78
End: 2018-09-07

## 2018-09-07 VITALS
WEIGHT: 235 LBS | HEIGHT: 69 IN | SYSTOLIC BLOOD PRESSURE: 156 MMHG | BODY MASS INDEX: 34.8 KG/M2 | DIASTOLIC BLOOD PRESSURE: 90 MMHG | TEMPERATURE: 98.6 F

## 2018-09-07 DIAGNOSIS — N32.89 BLADDER SPASMS: ICD-10-CM

## 2018-09-07 DIAGNOSIS — N32.0 BLADDER NECK CONTRACTURE: Primary | ICD-10-CM

## 2018-09-07 LAB — POST-VOID RESIDUAL VOLUME, ML POC: 113 ML

## 2018-09-07 PROCEDURE — 87086 URINE CULTURE/COLONY COUNT: CPT | Performed by: UROLOGY

## 2018-09-07 PROCEDURE — 51798 US URINE CAPACITY MEASURE: CPT | Performed by: UROLOGY

## 2018-09-07 RX ORDER — OXYBUTYNIN CHLORIDE 10 MG/1
10 TABLET, EXTENDED RELEASE ORAL
Qty: 14 TABLET | Refills: 0 | Status: SHIPPED | OUTPATIENT
Start: 2018-09-07 | End: 2018-09-11

## 2018-09-07 RX ORDER — CEPHALEXIN 500 MG/1
500 CAPSULE ORAL EVERY 12 HOURS SCHEDULED
Qty: 14 CAPSULE | Refills: 0 | Status: SHIPPED | OUTPATIENT
Start: 2018-09-07 | End: 2018-09-14

## 2018-09-07 NOTE — PROGRESS NOTES
Urethral dilation     Date/Time 9/7/2018 12:14 PM     Performed by  Kathi Patient     Authorized by Kathi Patient       Consent: Verbal consent obtained  Consent given by: patient  Patient understanding: patient states understanding of the procedure being performed  Relevant documents: relevant documents present and verified  Test results: test results available and properly labeled  Required items: required blood products, implants, devices, and special equipment available  Patient identity confirmed: verbally with patient  Time out: Immediately prior to procedure a "time out" was called to verify the correct patient, procedure, equipment, support staff and site/side marked as required  Preparation: Patient was prepped and draped in the usual sterile fashion  Site preparation: Betadine    Local anesthesia used: yes (2 % xylocaine jelly)     Anesthesia   Local anesthesia used: yes (2 % xylocaine jelly)  Anesthetic total: 10 mL     Sedation   Patient sedated: no      Specimen: no    Culture: yes   Procedure Details   Procedure Notes: Barajas catheter was removed yesterday morning at 10:00 a m   Since that time the patient is not been voiding effectively  He feels he is not emptying his bladder  He is having severe bladder spasms and dripping only small amounts of urine  Postvoid residual today was 113 cc but he remained very symptomatic with pain and bladder spasms  No fever  No gross hematuria  On exam the abdomen is soft  The bladder is not palpable  Patient was brought to the  room and placed on the procedure table  He was prepped and draped in usual sterile fashion and 2% xylocaine jelly administered to the urethra  The xylocaine jelly was lab to do well  The flexible cystoscope was used to perform urethroscopy where a 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   A guidewire was passed through the stricture and felt to traverse into the bladder  The cystoscope was withdrawn  July dilators were then used over the guidewire to dilate the urethra to 25 [place]  At that point a 16 [place] Barajas was placed over the guidewire into the bladder and drained of clear urine  A urine culture was taken  The procedure was well tolerated  We will plan to place the patient on empiric antibiotic therapy with Keflex 500 mg twice daily  He will begin oxybutynin XL for bladder spasms  He will return next week for follow-up with Dr Alon Turner    Patient Transportation: confirmed  Patient tolerance: Patient tolerated the procedure well with no immediate complications

## 2018-09-07 NOTE — TELEPHONE ENCOUNTER
Answering service page to my inpatient pager this teena Barros Champion called reporting abdominal cramping after yi removal yesterday  Can we please call and triage his complaints? Looks like Yi placed in ED over weekend for 300cc PVR and removed yesterday in office without event  Thanks

## 2018-09-07 NOTE — PATIENT INSTRUCTIONS

## 2018-09-07 NOTE — LETTER
September 7, 2018     Rishi Wynne MD  [address] [id number]    Patient: Karishma Abdul   YOB: 1940   Date of Visit: 9/7/2018       Dear Dr Alicia Dhaliwal: Thank you for referring Chen Colunga to me for evaluation  Below are my notes for this consultation  If you have questions, please do not hesitate to call me  I look forward to following your patient along with you  Sincerely,        Toni Barrow MD        CC: No Recipients  Toni Barrow MD  9/7/2018 12:20 PM  Sign at close encounter       Urethral dilation     Date/Time 9/7/2018 12:14 PM     Performed by  Edinson Vasquez     Authorized by Edinson Vasquez       Consent: Verbal consent obtained  Consent given by: patient  Patient understanding: patient states understanding of the procedure being performed  Relevant documents: relevant documents present and verified  Test results: test results available and properly labeled  Required items: required blood products, implants, devices, and special equipment available  Patient identity confirmed: verbally with patient  Time out: Immediately prior to procedure a "time out" was called to verify the correct patient, procedure, equipment, support staff and site/side marked as required  Preparation: Patient was prepped and draped in the usual sterile fashion  Site preparation: Betadine    Local anesthesia used: yes (2 % xylocaine jelly)     Anesthesia   Local anesthesia used: yes (2 % xylocaine jelly)  Anesthetic total: 10 mL     Sedation   Patient sedated: no      Specimen: no    Culture: yes   Procedure Details   Procedure Notes: Barajas catheter was removed yesterday morning at 10:00 a m   Since that time the patient is not been voiding effectively  He feels he is not emptying his bladder  He is having severe bladder spasms and dripping only small amounts of urine    Postvoid residual today was 113 cc but he remained very symptomatic with pain and bladder spasms  No fever  No gross hematuria  On exam the abdomen is soft  The bladder is not palpable  Patient was brought to the  room and placed on the procedure table  He was prepped and draped in usual sterile fashion and 2% xylocaine jelly administered to the urethra  The xylocaine jelly was lab to do well  The flexible cystoscope was used to perform urethroscopy where a 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  A guidewire was passed through the stricture and felt to traverse into the bladder  The cystoscope was withdrawn  July dilators were then used over the guidewire to dilate the urethra to 25 [place]  At that point a 16 [place] Barajas was placed over the guidewire into the bladder and drained of clear urine  A urine culture was taken  The procedure was well tolerated  We will plan to place the patient on empiric antibiotic therapy with Keflex 500 mg twice daily  He will begin oxybutynin XL for bladder spasms  He will return next week for follow-up with Dr Maria Alejandra Granados    Patient Transportation: confirmed  Patient tolerance: Patient tolerated the procedure well with no immediate complications

## 2018-09-08 LAB — BACTERIA UR CULT: NORMAL

## 2018-09-11 ENCOUNTER — PROCEDURE VISIT (OUTPATIENT)
Dept: UROLOGY | Facility: MEDICAL CENTER | Age: 78
End: 2018-09-11
Payer: MEDICARE

## 2018-09-11 VITALS
DIASTOLIC BLOOD PRESSURE: 86 MMHG | BODY MASS INDEX: 34.96 KG/M2 | HEIGHT: 69 IN | WEIGHT: 236 LBS | SYSTOLIC BLOOD PRESSURE: 140 MMHG

## 2018-09-11 DIAGNOSIS — N32.0 BLADDER NECK CONTRACTURE: Primary | ICD-10-CM

## 2018-09-11 DIAGNOSIS — Z85.46 PERSONAL HISTORY OF PROSTATE CANCER: ICD-10-CM

## 2018-09-11 PROBLEM — N32.89 BLADDER SPASMS: Status: RESOLVED | Noted: 2018-09-07 | Resolved: 2018-09-11

## 2018-09-11 PROCEDURE — 99214 OFFICE O/P EST MOD 30 MIN: CPT | Performed by: UROLOGY

## 2018-09-11 NOTE — PROGRESS NOTES
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 and external beam radiation therapy with detectable but stable and low PSA  The patient will continue with yearly PSA values    2  Bladder neck contracture-voiding trial today with repeat cystoscopy and possible dilation and office in 3 months with PVR check    3  Severe stress urinary incontinence continue present management     Diagnoses and all orders for this visit:    Bladder neck contracture    Personal history of prostate cancer          Subjective:     Patient ID: Kenn Vigil is a 70 y o  male  Chief complaint:  Bladder neck contracture    History of present illness:  72-year-old male well known to me status post radical prostatectomy for adenocarcinoma of the prostate with external beam radiation therapy thereafter for biochemical recurrence  The patient currently has a detectable PSA however the PSA value was very low and there is no upward trend  At the present time I cannot identify any evidence of recurrent prostate cancer  The patient however has a very noncompliant bladder neck and has significant intrinsic sphincter deficiency/stress urinary incontinence  The patient has been managed with a Cunningham clamp and a protective undergarment although he has had multiple discussions concerning artificial urinary sphincter placement and in fact has had male sling surgery which failed  The patient was last seen by me in June of 2018 at which time cystourethroscopy revealed a bladder neck contracture however the 25 [place] scope was able to traverse the bladder outlet and no dilation took place  The patient noted that just prior to Labor Day this summer he developed weakening of the urinary stream and became fully continent of urine  Soon thereafter the patient was unable to void and presented at which time Barajas catheter was inserted in the emergency room    The Barajas was then removed and the patient experienced bladder spasms presented to the office and was seen by Dr Rossy Horton who perform cystourethroscopy and identified a deep bulbar/membranous urethral stricture dilated to 18 [place] inserted a Barajas catheter despite a PVR of only 113 cc but because of severe bladder spasms  The spasm is resolved and the patient now presents to the office for further treatment and planning  Review of Systems   Constitutional: Positive for activity change and fatigue  HENT: Negative  Respiratory: Negative  Cardiovascular: Negative  Gastrointestinal: Negative  Genitourinary: Positive for difficulty urinating  Musculoskeletal: Positive for arthralgias  Neurological: Negative  Psychiatric/Behavioral: The patient is nervous/anxious  Objective:     Physical Exam   Constitutional: He is oriented to person, place, and time  He appears well-developed and well-nourished  No distress  HENT:   Head: Atraumatic  Eyes: EOM are normal    Neck: Neck supple  Pulmonary/Chest: Effort normal  No respiratory distress  Abdominal: Soft  Neurological: He is alert and oriented to person, place, and time  Psychiatric: His behavior is normal  Judgment and thought content normal     Anxious, solemn   Vitals reviewed

## 2018-09-12 ENCOUNTER — CLINICAL SUPPORT (OUTPATIENT)
Dept: UROLOGY | Facility: MEDICAL CENTER | Age: 78
End: 2018-09-12
Payer: MEDICARE

## 2018-09-12 ENCOUNTER — TELEPHONE (OUTPATIENT)
Dept: UROLOGY | Facility: MEDICAL CENTER | Age: 78
End: 2018-09-12

## 2018-09-12 DIAGNOSIS — R33.9 RETENTION OF URINE: Primary | ICD-10-CM

## 2018-09-12 LAB — POST-VOID RESIDUAL VOLUME, ML POC: 0 ML

## 2018-09-12 PROCEDURE — 99211 OFF/OP EST MAY X REQ PHY/QHP: CPT | Performed by: UROLOGY

## 2018-09-12 PROCEDURE — 51798 US URINE CAPACITY MEASURE: CPT | Performed by: UROLOGY

## 2018-09-12 NOTE — TELEPHONE ENCOUNTER
Patient had catheter removed 09/11/18  He has experienced bladder spasms throughout the night, which continue this morning  He also states he has restricted urination  Please call him at [phone]

## 2018-09-12 NOTE — PROGRESS NOTES
Pt in office with c/o bladder spasms  Bladder scan PVR= 0 cc  Pt took Oxybutynin XR x 2 in 5 hours  No longer has bladder spasms  Told him to avoid bladder irritants and take Oxybutynin q 24 hours

## 2018-10-08 ENCOUNTER — HOSPITAL ENCOUNTER (EMERGENCY)
Facility: HOSPITAL | Age: 78
Discharge: HOME | End: 2018-10-08
Attending: EMERGENCY MEDICINE
Payer: MEDICARE

## 2018-10-08 ENCOUNTER — TELEPHONE (OUTPATIENT)
Dept: UROLOGY | Facility: AMBULATORY SURGERY CENTER | Age: 78
End: 2018-10-08

## 2018-10-08 VITALS
DIASTOLIC BLOOD PRESSURE: 80 MMHG | HEART RATE: 56 BPM | BODY MASS INDEX: 35.56 KG/M2 | TEMPERATURE: 97 F | SYSTOLIC BLOOD PRESSURE: 174 MMHG | RESPIRATION RATE: 18 BRPM | OXYGEN SATURATION: 97 % | WEIGHT: 240.8 LBS

## 2018-10-08 DIAGNOSIS — N39.0 UTI (URINARY TRACT INFECTION): ICD-10-CM

## 2018-10-08 DIAGNOSIS — R33.9 URINARY RETENTION: Primary | ICD-10-CM

## 2018-10-08 LAB
BACTERIA UR QL AUTO: ABNORMAL /HPF
BILIRUB UR QL STRIP: NEGATIVE
CLARITY UR: CLEAR
COLOR UR: YELLOW
GLUCOSE UR STRIP-MCNC: NEGATIVE MG/DL
HGB UR QL STRIP.AUTO: ABNORMAL
KETONES UR STRIP-MCNC: NEGATIVE MG/DL
LEUKOCYTE ESTERASE UR QL STRIP: ABNORMAL
NITRITE UR QL STRIP: POSITIVE
NON-SQ EPI CELLS URNS QL MICRO: ABNORMAL /HPF
PH UR STRIP.AUTO: 8.5 [PH] (ref 4.5–8)
PROT UR STRIP-MCNC: ABNORMAL MG/DL
RBC #/AREA URNS AUTO: ABNORMAL /HPF
SP GR UR STRIP.AUTO: 1.01 (ref 1–1.03)
TRI-PHOS CRY URNS QL MICRO: ABNORMAL /HPF
UROBILINOGEN UR QL STRIP.AUTO: 0.2 E.U./DL
WBC #/AREA URNS AUTO: ABNORMAL /HPF

## 2018-10-08 PROCEDURE — 99283 EMERGENCY DEPT VISIT LOW MDM: CPT

## 2018-10-08 PROCEDURE — 81001 URINALYSIS AUTO W/SCOPE: CPT | Performed by: EMERGENCY MEDICINE

## 2018-10-08 RX ORDER — CEPHALEXIN 500 MG/1
500 CAPSULE ORAL 4 TIMES DAILY
Qty: 20 CAPSULE | Refills: 0 | Status: SHIPPED | OUTPATIENT
Start: 2018-10-08 | End: 2018-10-13

## 2018-10-08 NOTE — ED PROVIDER NOTES
History  Chief Complaint   Patient presents with    Urinary Retention     pt reports bladder fullness and nausea  hx of same and required catheter,  pt states he needs a catheter right away  has not voided since 0300  appears very uncomfortable in triage  Patient has not voided since 03:00  He has had this in the past and needed a catheter  No fevers, no nausea/vomiting            Prior to Admission Medications   Prescriptions Last Dose Informant Patient Reported? Taking? Coenzyme Q10 (COQ10) 100 MG CAPS   Yes Yes   Sig: Take by mouth   FISH OIL-CANOLA OIL-VIT D3 PO   Yes Yes   Sig: by Does not apply route   Misc Natural Products (PANTETHINE PLUS) TABS   Yes Yes   Sig: Take by mouth   OXYBUTYNIN CHLORIDE PO   Yes Yes   Sig: Take by mouth   Red Yeast Rice 600 MG CAPS   Yes Yes   Sig: Take by mouth   aspirin 81 MG tablet   Yes Yes   Sig: Take by mouth   lisinopril (ZESTRIL) 5 mg tablet   No Yes   Sig: Take 1 tablet (5 mg total) by mouth daily      Facility-Administered Medications: None       Past Medical History:   Diagnosis Date    Bladder neck contracture     ED (erectile dysfunction)     Frequency of urination     Hypercholesterolemia     Hypertension     12/13/17    Nocturia     Prostate cancer ([facility] [address] )     12/8/16    Stress incontinence        Past Surgical History:   Procedure Laterality Date    BLADDER SURGERY      Bladder Neck Restriction, SP Catheter    CHOLECYSTECTOMY      CYSTOSTOMY W/ BLADDER DILATION      PROSTATECTOMY      Radical    URETHRAL SLING         Family History   Problem Relation Age of Onset    Coronary artery disease Father         involving native coronary artery of native heart, angina presence unspecified     I have reviewed and agree with the history as documented      Social History   Substance Use Topics    Smoking status: Former Smoker    Smokeless tobacco: Never Used      Comment: quit 35+ years ago    Alcohol use No        Review of Systems Constitutional: Negative for appetite change, fatigue and fever  HENT: Negative for sore throat  Respiratory: Negative for cough, shortness of breath and wheezing  Cardiovascular: Negative for chest pain and leg swelling  Gastrointestinal: Negative for abdominal pain, diarrhea and vomiting  Genitourinary: Positive for difficulty urinating  Negative for flank pain  Musculoskeletal: Negative for back pain and neck pain  Skin: Negative for rash  Neurological: Negative for syncope and headaches  Psychiatric/Behavioral:        Mood normal       Physical Exam  Physical Exam   Constitutional: He is oriented to person, place, and time  He appears well-developed and well-nourished  HENT:   Head: Normocephalic and atraumatic  Neck: Normal range of motion  Neck supple  Cardiovascular: Normal rate and regular rhythm  Pulmonary/Chest: Effort normal and breath sounds normal    Abdominal: Soft  He exhibits distension  Suprapubic tenderness   Musculoskeletal: Normal range of motion  Neurological: He is alert and oriented to person, place, and time  Skin: Skin is warm and dry  Nursing note and vitals reviewed        Vital Signs  ED Triage Vitals   Temperature Pulse Respirations Blood Pressure SpO2   10/08/18 1027 10/08/18 1027 10/08/18 1027 10/08/18 1028 10/08/18 1027   (!) 97 °F (36 1 °C) 56 18 (!) 174/80 97 %      Temp Source Heart Rate Source Patient Position - Orthostatic VS BP Location FiO2 (%)   10/08/18 1027 -- -- -- --   Temporal          Pain Score       10/08/18 1027       9           Vitals:    10/08/18 1027 10/08/18 1028   BP:  (!) 174/80   Pulse: 56        Visual Acuity      ED Medications  Medications - No data to display    Diagnostic Studies  Results Reviewed     Procedure Component Value Units Date/Time    Urine Microscopic [[id number]]  (Abnormal) Collected:  10/08/18 1106    Lab Status:  Final result Specimen:  Urine from Urine, Indwelling Barajas Catheter Updated:  10/08/18 4806 RBC, UA 0-1 (A) /hpf      WBC, UA 4-10 (A) /hpf      Epithelial Cells Occasional /hpf      Bacteria, UA Moderate (A) /hpf      Triplep Phos Anne, UA Occasional /hpf     UA w Reflex to Microscopic w Reflex to Culture [[id number]]  (Abnormal) Collected:  10/08/18 1106    Lab Status:  Final result Specimen:  Urine from Urine, Indwelling Baraajs Catheter Updated:  10/08/18 1113     Color, UA Yellow     Clarity, UA Clear     Specific Gravity, UA 1 015     pH, UA 8 5 (H)     Leukocytes, UA Small (A)     Nitrite, UA Positive (A)     Protein, UA Trace (A) mg/dl      Glucose, UA Negative mg/dl      Ketones, UA Negative mg/dl      Urobilinogen, UA 0 2 E U /dl      Bilirubin, UA Negative     Blood, UA Small (A)                 No orders to display              Procedures  Procedures       Phone Contacts  ED Phone Contact    ED Course                               MDM  Number of Diagnoses or Management Options  Urinary retention:   UTI (urinary tract infection):      Amount and/or Complexity of Data Reviewed  Clinical lab tests: ordered and reviewed    Risk of Complications, Morbidity, and/or Mortality  Presenting problems: moderate  General comments: Nurse placed a Barajas and patient felt better      CritCare Time    Disposition  Final diagnoses:   Urinary retention   UTI (urinary tract infection)     Time reflects when diagnosis was documented in both MDM as applicable and the Disposition within this note     Time User Action Codes Description Comment    10/8/2018 11:16 AM Marlen Kerr Add [R33 9] Urinary retention     10/8/2018 11:16 AM Marlen Kerr Add [N39 0] UTI (urinary tract infection)       ED Disposition     ED Disposition Condition Comment    Discharge  Chey Sutherland discharge to home/self care      Condition at discharge: Stable        Follow-up Information     Follow up With Specialties Details Why Contact Eli Oglesby MD Family Medicine   [address]  [address]  [facility] Lennox Hargrove MD Urology   [facility]  [address]  [address]  [phone]            Discharge Medication List as of 10/8/2018 11:18 AM      START taking these medications    Details   cephalexin (KEFLEX) 500 mg capsule Take 1 capsule (500 mg total) by mouth 4 (four) times a day for 5 days, Starting Mon 10/8/2018, Until Sat 10/13/2018, Print         CONTINUE these medications which have NOT CHANGED    Details   aspirin 81 MG tablet Take by mouth, Historical Med      Coenzyme Q10 (COQ10) 100 MG CAPS Take by mouth, Historical Med      FISH OIL-CANOLA OIL-VIT D3 PO by Does not apply route, Historical Med      lisinopril (ZESTRIL) 5 mg tablet Take 1 tablet (5 mg total) by mouth daily, Starting Thu 3/22/2018, Normal      Misc Natural Products (PANTETHINE PLUS) TABS Take by mouth, Historical Med      OXYBUTYNIN CHLORIDE PO Take by mouth, Historical Med      Red Yeast Rice 600 MG CAPS Take by mouth, Historical Med           No discharge procedures on file      ED Provider  Electronically Signed by           Rita Jonas MD  11/04/18 1065

## 2018-10-08 NOTE — TELEPHONE ENCOUNTER
Spoke with patient, patient would like a call back  He said he is having bladder spasms and he is in a lot of pain  Please call him back

## 2018-10-08 NOTE — ED NOTES
Patient reports improvement in pain since placement of yi catheter     Shalini Guerra RN  10/08/18 [id number]

## 2018-10-09 ENCOUNTER — TELEPHONE (OUTPATIENT)
Dept: UROLOGY | Facility: MEDICAL CENTER | Age: 78
End: 2018-10-09

## 2018-10-09 NOTE — TELEPHONE ENCOUNTER
Spoke to pt and he stated that he went to ER yesterday  Catheter was placed  Pt wants to follow up with Dr Glendy Cornell to discuss his options

## 2018-10-09 NOTE — TELEPHONE ENCOUNTER
Patient is having bladder spasms with catheter that causes him to not be able to go  Please call patient and advise

## 2018-10-11 ENCOUNTER — VBI (OUTPATIENT)
Dept: ADMINISTRATIVE | Facility: OTHER | Age: 78
End: 2018-10-11

## 2018-10-15 ENCOUNTER — TELEPHONE (OUTPATIENT)
Dept: UROLOGY | Facility: MEDICAL CENTER | Age: 78
End: 2018-10-15

## 2018-12-04 DIAGNOSIS — I10 ESSENTIAL HYPERTENSION: ICD-10-CM

## 2018-12-04 RX ORDER — LISINOPRIL 5 MG/1
TABLET ORAL
Qty: 90 TABLET | Refills: 0 | Status: SHIPPED | OUTPATIENT
Start: 2018-12-04 | End: 2019-02-26 | Stop reason: SDUPTHER

## 2019-02-26 ENCOUNTER — OFFICE VISIT (OUTPATIENT)
Dept: FAMILY MEDICINE CLINIC | Facility: CLINIC | Age: 79
End: 2019-02-26
Payer: MEDICARE

## 2019-02-26 VITALS
HEART RATE: 62 BPM | SYSTOLIC BLOOD PRESSURE: 134 MMHG | DIASTOLIC BLOOD PRESSURE: 84 MMHG | HEIGHT: 69 IN | RESPIRATION RATE: 16 BRPM | BODY MASS INDEX: 36.14 KG/M2 | WEIGHT: 244 LBS | OXYGEN SATURATION: 96 %

## 2019-02-26 DIAGNOSIS — Z13.6 SCREENING FOR CARDIOVASCULAR CONDITION: ICD-10-CM

## 2019-02-26 DIAGNOSIS — E66.9 OBESITY (BMI 35.0-39.9 WITHOUT COMORBIDITY): ICD-10-CM

## 2019-02-26 DIAGNOSIS — Z00.00 MEDICARE ANNUAL WELLNESS VISIT, SUBSEQUENT: ICD-10-CM

## 2019-02-26 DIAGNOSIS — I10 ESSENTIAL HYPERTENSION: Primary | ICD-10-CM

## 2019-02-26 PROCEDURE — 99213 OFFICE O/P EST LOW 20 MIN: CPT | Performed by: FAMILY MEDICINE

## 2019-02-26 PROCEDURE — G0439 PPPS, SUBSEQ VISIT: HCPCS | Performed by: FAMILY MEDICINE

## 2019-02-26 RX ORDER — LISINOPRIL 5 MG/1
5 TABLET ORAL DAILY
Qty: 90 TABLET | Refills: 3 | Status: SHIPPED | OUTPATIENT
Start: 2019-02-26 | End: 2020-01-20

## 2019-02-26 NOTE — PATIENT INSTRUCTIONS
Medical management-continue lisinopril 5 mg daily  Continue monitor blood pressure  Goal generally less than 130/80  Continue to watch proper diet  Will check CMP without lipid panel this year  Continue supplements  Continue exercise program   Follow-up with Urology for bladder issues  Recheck here every 6-12 months  Consider getting shingles vaccine and tetanus shot at the pharmacy  Obesity   AMBULATORY CARE:   Obesity  is when your body mass index (BMI) is greater than 30  Your healthcare provider will use your height and weight to measure your BMI  The risks of obesity include  many health problems, such as injuries or physical disability  You may need tests to check for the following:  · Diabetes     · High blood pressure or high cholesterol     · Heart disease     · Gallbladder or liver disease     · Cancer of the colon, breast, prostate, liver, or kidney     · Sleep apnea     · Arthritis or gout  Seek care immediately if:   · You have a severe headache, confusion, or difficulty speaking  · You have weakness on one side of your body  · You have chest pain, sweating, or shortness of breath  Contact your healthcare provider if:   · You have symptoms of gallbladder or liver disease, such as pain in your upper abdomen  · You have knee or hip pain and discomfort while walking  · You have symptoms of diabetes, such as intense hunger and thirst, and frequent urination  · You have symptoms of sleep apnea, such as snoring or daytime sleepiness  · You have questions or concerns about your condition or care  Treatment for obesity  focuses on helping you lose weight to improve your health  Even a small decrease in BMI can reduce the risk for many health problems  Your healthcare provider will help you set a weight-loss goal   · Lifestyle changes  are the first step in treating obesity  These include making healthy food choices and getting regular physical activity   Your healthcare provider may suggest a weight-loss program that involves coaching, education, and therapy  · Medicine  may help you lose weight when it is used with a healthy diet and physical activity  · Surgery  can help you lose weight if you are very obese and have other health problems  There are several types of weight-loss surgery  Ask your healthcare provider for more information  Be successful losing weight:   · Set small, realistic goals  An example of a small goal is to walk for 20 minutes 5 days a week  Anther goal is to lose 5% of your body weight  · Tell friends, family members, and coworkers about your goals  and ask for their support  Ask a friend to lose weight with you, or join a weight-loss support group  · Identify foods or triggers that may cause you to overeat , and find ways to avoid them  Remove tempting high-calorie foods from your home and workplace  Place a bowl of fresh fruit on your kitchen counter  If stress causes you to eat, then find other ways to cope with stress  · Keep a diary to track what you eat and drink  Also write down how many minutes of physical activity you do each day  Weigh yourself once a week and record it in your diary  Eating changes: You will need to eat 500 to 1,000 fewer calories each day than you currently eat to lose 1 to 2 pounds a week  The following changes will help you cut calories:  · Eat smaller portions  Use small plates, no larger than 9 inches in diameter  Fill your plate half full of fruits and vegetables  Measure your food using measuring cups until you know what a serving size looks like  · Eat 3 meals and 1 or 2 snacks each day  Plan your meals in advance  Lary Garzon and eat at home most of the time  Eat slowly  · Eat fruits and vegetables at every meal   They are low in calories and high in fiber, which makes you feel full  Do not add butter, margarine, or cream sauce to vegetables  Use herbs to season steamed vegetables       · Eat less fat and fewer fried foods  Eat more baked or grilled chicken and fish  These protein sources are lower in calories and fat than red meat  Limit fast food  Dress your salads with olive oil and vinegar instead of bottled dressing  · Limit the amount of sugar you eat  Do not drink sugary beverages  Limit alcohol  Activity changes:  Physical activity is good for your body in many ways  It helps you burn calories and build strong muscles  It decreases stress and depression, and improves your mood  It can also help you sleep better  Talk to your healthcare provider before you begin an exercise program   · Exercise for at least 30 minutes 5 days a week  Start slowly  Set aside time each day for physical activity that you enjoy and that is convenient for you  It is best to do both weight training and an activity that increases your heart rate, such as walking, bicycling, or swimming  · Find ways to be more active  Do yard work and housecleaning  Walk up the stairs instead of using elevators  Spend your leisure time going to events that require walking, such as outdoor festivals or fairs  This extra physical activity can help you lose weight and keep it off  Follow up with your healthcare provider as directed: You may need to meet with a dietitian  Write down your questions so you remember to ask them during your visits  © 2017 [address] Information is for End User's use only and may not be sold, redistributed or otherwise used for commercial purposes  All illustrations and images included in CareNotes® are the copyrighted property of [employer]  or [employer]  The above information is an  only  It is not intended as medical advice for individual conditions or treatments  Talk to your doctor, nurse or pharmacist before following any medical regimen to see if it is safe and effective for you      Weight Management   AMBULATORY CARE:   Why it is important to manage your weight:  Being overweight increases your risk of health conditions such as heart disease, high blood pressure, type 2 diabetes, and certain types of cancer  It can also increase your risk for osteoarthritis, sleep apnea, and other respiratory problems  Aim for a slow, steady weight loss  Even a small amount of weight loss can lower your risk of health problems  How to lose weight safely:  A safe and healthy way to lose weight is to eat fewer calories and get regular exercise  You can lose up about 1 pound a week by decreasing the number of calories you eat by 500 calories each day  You can decrease calories by eating smaller portion sizes or by cutting out high-calorie foods  Read labels to find out how many calories are in the foods you eat  You can also burn calories with exercise such as walking, swimming, or biking  You will be more likely to keep weight off if you make these changes part of your lifestyle  Healthy meal plan for weight management:  A healthy meal plan includes a variety of foods, contains fewer calories, and helps you stay healthy  A healthy meal plan includes the following:  · Eat whole-grain foods more often  A healthy meal plan should contain fiber  Fiber is the part of grains, fruits, and vegetables that is not broken down by your body  Whole-grain foods are healthy and provide extra fiber in your diet  Some examples of whole-grain foods are whole-wheat breads and pastas, oatmeal, brown rice, and bulgur  · Eat a variety of vegetables every day  Include dark, leafy greens such as spinach, kale, sapna greens, and mustard greens  Eat yellow and orange vegetables such as carrots, sweet potatoes, and winter squash  · Eat a variety of fruits every day  Choose fresh or canned fruit (canned in its own juice or light syrup) instead of juice  Fruit juice has very little or no fiber  · Eat low-fat dairy foods  Drink fat-free (skim) milk or 1% milk   Eat fat-free yogurt and low-fat cottage cheese  Try low-fat cheeses such as mozzarella and other reduced-fat cheeses  · Choose meat and other protein foods that are low in fat  Choose beans or other legumes such as split peas or lentils  Choose fish, skinless poultry (chicken or turkey), or lean cuts of red meat (beef or pork)  Before you cook meat or poultry, cut off any visible fat  · Use less fat and oil  Try baking foods instead of frying them  Add less fat, such as margarine, sour cream, regular salad dressing and mayonnaise to foods  Eat fewer high-fat foods  Some examples of high-fat foods include french fries, doughnuts, ice cream, and cakes  · Eat fewer sweets  Limit foods and drinks that are high in sugar  This includes candy, cookies, regular soda, and sweetened drinks  Ways to decrease calories:   · Eat smaller portions  ¨ Use a small plate with smaller servings  ¨ Do not eat second helpings  ¨ When you eat at a restaurant, ask for a box and place half of your meal in the box before you eat  ¨ Share an entrée with someone else  · Replace high-calorie snacks with healthy, low-calorie snacks  ¨ Choose fresh fruit, vegetables, fat-free rice cakes, or air-popped popcorn instead of potato chips, nuts, or chocolate  ¨ Choose water or calorie-free drinks instead of soda or sweetened drinks  · Eat regular meals  Skipping meals can lead to overeating later in the day  Eat a healthy snack in place of a meal if you do not have time to eat a regular meal      · Do not shop for groceries when you are hungry  You may be more likely to make unhealthy food choices  Take a grocery list of healthy foods and shop after you have eaten  Exercise:  Exercise at least 30 minutes per day on most days of the week  Some examples of exercise include walking, biking, dancing, and swimming   You can also fit in more physical activity by taking the stairs instead of the elevator or parking farther away from stores  Ask your healthcare provider about the best exercise plan for you  Other things to consider as you try to lose weight:   · Be aware of situations that may give you the urge to overeat, such as eating while watching television  Find ways to avoid these situations  For example, read a book, go for a walk, or do crafts  · Meet with a weight loss support group or friends who are also trying to lose weight  This may help you stay motivated to continue working on your weight loss goals  © 2017 [address] Information is for End User's use only and may not be sold, redistributed or otherwise used for commercial purposes  All illustrations and images included in CareNotes® are the copyrighted property of [employer] M [employer] Inc  or [employer]am[employer]  The above information is an  only  It is not intended as medical advice for individual conditions or treatments  Talk to your doctor, nurse or pharmacist before following any medical regimen to see if it is safe and effective for you

## 2019-02-26 NOTE — PROGRESS NOTES
Subjective:   Chief Complaint   Patient presents with    Back Pain     med refill, back pain        Patient ID: Chey Sutherland is a 70 y o  male  Medical management-  1) hypertension good control current medications no cardiac symptoms  2) previous history of prostate cancer continues to follow Urology  Has some urinary retention due to potential bladder neck strictures  3) very minimal elevation LDL  Patient takes supplements for this  4) low back pain related to DJD  Over-the-counter medications as needed basis per      The following portions of the patient's history were reviewed and updated as appropriate: allergies, current medications, past family history, past medical history, past social history, past surgical history and problem list     Review of Systems   Constitutional: Negative for activity change, appetite change, diaphoresis and fatigue  Respiratory: Negative for cough, chest tightness, shortness of breath and wheezing  Cardiovascular: Negative for chest pain, palpitations and leg swelling  Fast or slow heart rate   Gastrointestinal: Negative for abdominal pain, blood in stool, constipation, diarrhea, nausea and vomiting  Genitourinary: Negative for difficulty urinating, dysuria and frequency  Musculoskeletal: Positive for arthralgias  Negative for gait problem, joint swelling and myalgias  Neurological: Negative for dizziness, light-headedness and headaches  Psychiatric/Behavioral: Negative for agitation, confusion, dysphoric mood and sleep disturbance  The patient is not nervous/anxious  Objective:  Vitals:    02/26/19 1443   BP: 134/84   Pulse: 62   Resp: 16   SpO2: 96%   Weight: 111 kg (244 lb)   Height: 5' 9" (1 753 m)      Physical Exam   Constitutional: He is oriented to person, place, and time  He appears well-developed and well-nourished  No distress  HENT:   Head: Normocephalic and atraumatic     Right Ear: Tympanic membrane, external ear and ear canal normal    Left Ear: Tympanic membrane, external ear and ear canal normal    Nose: No mucosal edema or rhinorrhea  Right sinus exhibits no maxillary sinus tenderness  Left sinus exhibits no maxillary sinus tenderness  Mouth/Throat: Uvula is midline  Mucous membranes are not pale and not dry  Normal dentition  No oropharyngeal exudate  Eyes: Pupils are equal, round, and reactive to light  EOM are normal  Right eye exhibits no discharge  Left eye exhibits no discharge  Neck: Normal range of motion  Neck supple  No thyromegaly present  Cardiovascular: Normal rate, regular rhythm, normal heart sounds and intact distal pulses  No murmur heard  Pulmonary/Chest: Effort normal and breath sounds normal  No respiratory distress  He has no wheezes  He has no rales  Abdominal: Soft  He exhibits no distension and no mass  There is no tenderness  Musculoskeletal: He exhibits no edema  Lumbar back: He exhibits decreased range of motion, tenderness and bony tenderness  Lymphadenopathy:     He has no cervical adenopathy  Neurological: He is alert and oriented to person, place, and time  No cranial nerve deficit  Skin: He is not diaphoretic  Psychiatric: He has a normal mood and affect  His behavior is normal          Assessment/Plan:    No problem-specific Assessment & Plan notes found for this encounter  Diagnoses and all orders for this visit:    Essential hypertension  -     lisinopril (ZESTRIL) 5 mg tablet; Take 1 tablet (5 mg total) by mouth daily  -     Comprehensive metabolic panel; Future  -     Comprehensive metabolic panel    Medicare annual wellness visit, subsequent    Obesity (BMI 35 0-39 9 without comorbidity)    Screening for cardiovascular condition    Other orders  -     B Complex Vitamins (VITAMIN B COMPLEX PO); Take 1 capsule by mouth  -     Cholecalciferol 5000 units capsule; Take 5,000 Units by mouth         Medical management-continue lisinopril 5 mg daily    Continue monitor blood pressure  Goal generally less than 130/80  Continue to watch proper diet  Will check CMP without lipid panel this year  Continue supplements  Continue exercise program   Follow-up with Urology for bladder issues  Recheck here every 6-12 months  Consider getting shingles vaccine and tetanus shot at the pharmacy  BMI Counseling: Body mass index is 36 03 kg/m²  Discussed the patient's BMI with him  The BMI is above average  BMI counseling and education was provided to the patient  Nutrition recommendations include reducing portion sizes, decreasing overall calorie intake, 3-5 servings of fruits/vegetables daily and moderation in carbohydrate intake  Exercise recommendations include moderate aerobic physical activity for 150 minutes/week and exercising 3-5 times per week

## 2019-02-26 NOTE — PROGRESS NOTES
Assessment and Plan:    Problem List Items Addressed This Visit     None        Health Maintenance Due   Topic Date Due    Depression Screening PHQ  1940    Medicare Annual Wellness Visit (AWV)  1940    BMI: Followup Plan  02/25/1960    DTaP,Tdap,and Td Vaccines (1 - Tdap) 02/25/1963    Fall Risk  02/25/2007    INFLUENZA VACCINE  07/01/2018         HPI:  Paula Marti is a 70 y o  male here for his Subsequent Wellness Visit      Patient Active Problem List   Diagnosis    Hypertension    Personal history of prostate cancer    AKIRA (stress urinary incontinence), male    Bladder neck contracture     Past Medical History:   Diagnosis Date    Bladder neck contracture     ED (erectile dysfunction)     Frequency of urination     Hypercholesterolemia     Hypertension     12/13/17    Nocturia     Prostate cancer ([facility] [address] )     12/8/16    Stress incontinence      Past Surgical History:   Procedure Laterality Date    BLADDER SURGERY      Bladder Neck Restriction, SP Catheter    CHOLECYSTECTOMY      CYSTOSTOMY W/ BLADDER DILATION      PROSTATECTOMY      Radical    URETHRAL SLING       Family History   Problem Relation Age of Onset    Coronary artery disease Father         involving native coronary artery of native heart, angina presence unspecified     Social History     Tobacco Use   Smoking Status Former Smoker   Smokeless Tobacco Never Used   Tobacco Comment    quit 35+ years ago     Social History     Substance and Sexual Activity   Alcohol Use No      Social History     Substance and Sexual Activity   Drug Use No       Current Outpatient Medications   Medication Sig Dispense Refill    aspirin 81 MG tablet Take by mouth      Coenzyme Q10 (COQ10) 100 MG CAPS Take by mouth      FISH OIL-CANOLA OIL-VIT D3 PO by Does not apply route      lisinopril (ZESTRIL) 5 mg tablet TAKE ONE TABLET (5 MG TOTAL) BY MOUTH DAILY 90 tablet 0    Misc Natural Products (PANTETHINE PLUS) TABS Take by mouth      OXYBUTYNIN CHLORIDE PO Take by mouth      Red Yeast Rice 600 MG CAPS Take by mouth       No current facility-administered medications for this visit  No Known Allergies  Immunization History   Administered Date(s) Administered    Pneumococcal Conjugate 13-Valent 12/13/2017    Pneumococcal Polysaccharide PPV23 05/17/2007       Patient Care Team:  Misty Han MD as PCP - General  Malena Barbour MD    Medicare Screening Tests and Risk Assessments:  Eric Siegel is here for his Subsequent Wellness visit  Last Medicare Wellness visit information reviewed, patient interviewed and updates made to the record today  Health Risk Assessment:  Patient rates overall health as good  Patient feels that their physical health rating is Slightly worse  Eyesight was rated as Same  Hearing was rated as Same  Patient feels that their emotional and mental health rating is Same  Pain experienced by patient in the last 7 days has been Some  Patient's pain rating has been 4/10  Emotional/Mental Health:  Patient has been feeling nervous/anxious  PHQ-9 Depression Screening:    Frequency of the following problems over the past two weeks:      1  Little interest or pleasure in doing things: 0 - not at all      2  Feeling down, depressed, or hopeless: 0 - not at all  PHQ-2 Score: 0          Broken Bones/Falls: Fall Risk Assessment:    In the past year, patient has experienced: No history of falling in past year          Bladder/Bowel:  Patient has not leaked urine accidently in the last six months  Patient reports no loss of bowel control  Immunizations:  Patient has had a flu vaccination within the last year  Patient has received a pneumonia shot  Patient has not received a shingles shot  Patient has not received tetanus/diphtheria shot  Home Safety:  Patient does not have trouble with stairs inside or outside of their home     Patient currently reports that there are no safety hazards present in home, working smoke alarms, working carbon monoxide detectors  Preventative Screenings:   no prostate cancer screen performed, no colon cancer screen completed, no cholesterol screen completed, no glaucoma eye exam completed    Nutrition:  Current diet: Regular with servings of the following:    Medications:  Patient is currently taking over-the-counter supplements  Patient is able to manage medications  Lifestyle Choices:  Patient reports no tobacco use  Patient has smoked or used tobacco in the past   Patient has stopped his tobacco use  Tobacco use quit date: y-35  Patient reports no alcohol use  Patient drives a vehicle  Patient wears seat belt  Activities of Daily Living:  Can get out of bed by his or her self, able to dress self, able to make own meals, able to do own shopping, able to bathe self, can do own laundry/housekeeping, can manage own money, pay bills and track expenses    Previous Hospitalizations:  Hospitalization or ED visit in past 12 months  Number of hospitalizations within the last year: 1-2        Advanced Directives:  Patient has decided on a power of   Patient has spoken to designated power of   Patient has completed advanced directive  Preventative Screening/Counseling:      Cardiovascular:      General: Screening Current          Diabetes:      General: Screening Current          Colorectal Cancer:      General: Screening Current          Prostate Cancer:      General: Screening Current      Comments: History of prostate cancer post surgery  Also had radiation  Followed by urologist         Osteoporosis:      General: Screening Not Indicated          AAA:      General: Screening Current          Glaucoma:      General: Screening Current          Hepatitis C:      General: Screening Not Indicated        Advanced Directives:   Patient has living will for healthcare, has durable POA for healthcare, patient has an advanced directive   Provider agrees with end of life decisions   Additional Comments:  Needs to be updated  Immunizations:      Influenza: Influenza Recommended Annually      Pneumococcal: Lifetime Vaccine Completed      Shingrix: Risks & Benefits Discussed      TDAP: Risks & Benefits Discussed  Additional Comments:  Consider getting shingles vaccine and tetanus shot pharmacy        Referrals:  Referral(s) to: Urology  Additional Comments: Ophthalmology

## 2020-01-20 DIAGNOSIS — I10 ESSENTIAL HYPERTENSION: Primary | ICD-10-CM

## 2020-01-20 DIAGNOSIS — I10 ESSENTIAL HYPERTENSION: ICD-10-CM

## 2020-01-20 DIAGNOSIS — E78.00 ELEVATED CHOLESTEROL: ICD-10-CM

## 2020-01-20 RX ORDER — LISINOPRIL 5 MG/1
TABLET ORAL
Qty: 90 TABLET | Refills: 0 | Status: SHIPPED | OUTPATIENT
Start: 2020-01-20 | End: 2020-02-11 | Stop reason: SDUPTHER

## 2020-01-20 NOTE — TELEPHONE ENCOUNTER
Review-looks like due Medicare wellness in February  Should have labs prior to if not done by someone else  This would include CMP, lipid panel, CBC-no PSA needed due to age

## 2020-02-04 LAB
ALBUMIN SERPL-MCNC: 4 G/DL (ref 3.6–5.1)
ALBUMIN/GLOB SERPL: 2 (CALC) (ref 1–2.5)
ALP SERPL-CCNC: 57 U/L (ref 35–144)
ALT SERPL-CCNC: 17 U/L (ref 9–46)
AST SERPL-CCNC: 14 U/L (ref 10–35)
BASOPHILS # BLD AUTO: 18 CELLS/UL (ref 0–200)
BASOPHILS NFR BLD AUTO: 0.4 %
BILIRUB SERPL-MCNC: 0.9 MG/DL (ref 0.2–1.2)
BUN SERPL-MCNC: 19 MG/DL (ref 7–25)
BUN/CREAT SERPL: ABNORMAL (CALC) (ref 6–22)
CALCIUM SERPL-MCNC: 9.3 MG/DL (ref 8.6–10.3)
CHLORIDE SERPL-SCNC: 106 MMOL/L (ref 98–110)
CHOLEST SERPL-MCNC: 176 MG/DL
CHOLEST/HDLC SERPL: 4.3 (CALC)
CO2 SERPL-SCNC: 26 MMOL/L (ref 20–32)
CREAT SERPL-MCNC: 1.07 MG/DL (ref 0.7–1.18)
EOSINOPHIL # BLD AUTO: 140 CELLS/UL (ref 15–500)
EOSINOPHIL NFR BLD AUTO: 3.1 %
ERYTHROCYTE [DISTWIDTH] IN BLOOD BY AUTOMATED COUNT: 13.1 % (ref 11–15)
GLOBULIN SER CALC-MCNC: 2 G/DL (CALC) (ref 1.9–3.7)
GLUCOSE SERPL-MCNC: 89 MG/DL (ref 65–99)
HCT VFR BLD AUTO: 43.7 % (ref 38.5–50)
HDLC SERPL-MCNC: 41 MG/DL
HGB BLD-MCNC: 14.6 G/DL (ref 13.2–17.1)
LDLC SERPL CALC-MCNC: 113 MG/DL (CALC)
LYMPHOCYTES # BLD AUTO: 1382 CELLS/UL (ref 850–3900)
LYMPHOCYTES NFR BLD AUTO: 30.7 %
MCH RBC QN AUTO: 31.8 PG (ref 27–33)
MCHC RBC AUTO-ENTMCNC: 33.4 G/DL (ref 32–36)
MCV RBC AUTO: 95.2 FL (ref 80–100)
MONOCYTES # BLD AUTO: 509 CELLS/UL (ref 200–950)
MONOCYTES NFR BLD AUTO: 11.3 %
NEUTROPHILS # BLD AUTO: 2453 CELLS/UL (ref 1500–7800)
NEUTROPHILS NFR BLD AUTO: 54.5 %
NONHDLC SERPL-MCNC: 135 MG/DL (CALC)
PLATELET # BLD AUTO: 165 THOUSAND/UL (ref 140–400)
PMV BLD REES-ECKER: 10.2 FL (ref 7.5–12.5)
POTASSIUM SERPL-SCNC: 4.4 MMOL/L (ref 3.5–5.3)
PROT SERPL-MCNC: 6 G/DL (ref 6.1–8.1)
RBC # BLD AUTO: 4.59 MILLION/UL (ref 4.2–5.8)
SL AMB EGFR AFRICAN AMERICAN: 77 ML/MIN/1.73M2
SL AMB EGFR NON AFRICAN AMERICAN: 67 ML/MIN/1.73M2
SODIUM SERPL-SCNC: 138 MMOL/L (ref 135–146)
TRIGL SERPL-MCNC: 109 MG/DL
WBC # BLD AUTO: 4.5 THOUSAND/UL (ref 3.8–10.8)

## 2020-02-05 ENCOUNTER — TELEPHONE (OUTPATIENT)
Dept: FAMILY MEDICINE CLINIC | Facility: CLINIC | Age: 80
End: 2020-02-05

## 2020-02-05 NOTE — TELEPHONE ENCOUNTER
----- Message from Gwendalyn Bosworth, MD sent at 2/5/2020  8:24 AM EST -----  Call patient with lab result-call patient, labs look good  Will review at office visit

## 2020-02-11 ENCOUNTER — OFFICE VISIT (OUTPATIENT)
Dept: FAMILY MEDICINE CLINIC | Facility: CLINIC | Age: 80
End: 2020-02-11
Payer: COMMERCIAL

## 2020-02-11 VITALS
WEIGHT: 238 LBS | HEIGHT: 68 IN | TEMPERATURE: 98.2 F | BODY MASS INDEX: 36.07 KG/M2 | HEART RATE: 76 BPM | OXYGEN SATURATION: 95 % | DIASTOLIC BLOOD PRESSURE: 82 MMHG | SYSTOLIC BLOOD PRESSURE: 128 MMHG

## 2020-02-11 DIAGNOSIS — Z00.00 MEDICARE ANNUAL WELLNESS VISIT, SUBSEQUENT: Primary | ICD-10-CM

## 2020-02-11 DIAGNOSIS — Z85.46 PERSONAL HISTORY OF PROSTATE CANCER: ICD-10-CM

## 2020-02-11 DIAGNOSIS — I10 ESSENTIAL HYPERTENSION: ICD-10-CM

## 2020-02-11 PROCEDURE — 1170F FXNL STATUS ASSESSED: CPT | Performed by: FAMILY MEDICINE

## 2020-02-11 PROCEDURE — 3074F SYST BP LT 130 MM HG: CPT | Performed by: FAMILY MEDICINE

## 2020-02-11 PROCEDURE — 3079F DIAST BP 80-89 MM HG: CPT | Performed by: FAMILY MEDICINE

## 2020-02-11 PROCEDURE — 4040F PNEUMOC VAC/ADMIN/RCVD: CPT | Performed by: FAMILY MEDICINE

## 2020-02-11 PROCEDURE — 1160F RVW MEDS BY RX/DR IN RCRD: CPT | Performed by: FAMILY MEDICINE

## 2020-02-11 PROCEDURE — G0439 PPPS, SUBSEQ VISIT: HCPCS | Performed by: FAMILY MEDICINE

## 2020-02-11 PROCEDURE — 1125F AMNT PAIN NOTED PAIN PRSNT: CPT | Performed by: FAMILY MEDICINE

## 2020-02-11 PROCEDURE — 1036F TOBACCO NON-USER: CPT | Performed by: FAMILY MEDICINE

## 2020-02-11 PROCEDURE — 3008F BODY MASS INDEX DOCD: CPT | Performed by: FAMILY MEDICINE

## 2020-02-11 PROCEDURE — 99213 OFFICE O/P EST LOW 20 MIN: CPT | Performed by: FAMILY MEDICINE

## 2020-02-11 RX ORDER — TURMERIC ROOT EXTRACT 500 MG
500 TABLET ORAL DAILY
COMMUNITY
End: 2021-11-08

## 2020-02-11 RX ORDER — LISINOPRIL 5 MG/1
5 TABLET ORAL DAILY
Qty: 90 TABLET | Refills: 3 | Status: SHIPPED | OUTPATIENT
Start: 2020-02-11 | End: 2021-03-15

## 2020-02-11 NOTE — PATIENT INSTRUCTIONS
Continue current medication  Monitor blood pressure  Continue to watch diet, stay active, and achieve some weight loss  Follow-up with urology as advised  Bring a copy of your advanced directive when available  Medicare Preventive Visit Patient Instructions  Thank you for completing your Welcome to Medicare Visit or Medicare Annual Wellness Visit today  Your next wellness visit will be due in one year (2/11/2021)  The screening/preventive services that you may require over the next 5-10 years are detailed below  Some tests may not apply to you based off risk factors and/or age  Screening tests ordered at today's visit but not completed yet may show as past due  Also, please note that scanned in results may not display below  Preventive Screenings:  Service Recommendations Previous Testing/Comments   Colorectal Cancer Screening  · Colonoscopy    · Fecal Occult Blood Test (FOBT)/Fecal Immunochemical Test (FIT)  · Fecal DNA/Cologuard Test  · Flexible Sigmoidoscopy Age: 54-65 years old   Colonoscopy: every 10 years (May be performed more frequently if at higher risk)  OR  FOBT/FIT: every 1 year  OR  Cologuard: every 3 years  OR  Sigmoidoscopy: every 5 years  Screening may be recommended earlier than age 48 if at higher risk for colorectal cancer  Also, an individualized decision between you and your healthcare provider will decide whether screening between the ages of 74-80 would be appropriate   Colonoscopy: Not on file  FOBT/FIT: Not on file  Cologuard: Not on file  Sigmoidoscopy: Not on file         Prostate Cancer Screening Individualized decision between patient and health care provider in men between ages of 53-78   Medicare will cover every 12 months beginning on the day after your 50th birthday PSA: No results in last 5 years     History Prostate Cancer  Screening Not Indicated     Hepatitis C Screening Once for adults born between [facility]  More frequently in patients at high risk for Hepatitis C Hep C Antibody: Not on file       Diabetes Screening 1-2 times per year if you're at risk for diabetes or have pre-diabetes Fasting glucose: No results in last 5 years   A1C: No results in last 5 years    Screening Current   Cholesterol Screening Once every 5 years if you don't have a lipid disorder  May order more often based on risk factors  Lipid panel: 02/04/2020    Screening Not Indicated  History Lipid Disorder      Other Preventive Screenings Covered by Medicare:  1  Abdominal Aortic Aneurysm (AAA) Screening: covered once if your at risk  You're considered to be at risk if you have a family history of AAA or a male between the age of 73-68 who smoking at least 100 cigarettes in your lifetime  2  Lung Cancer Screening: covers low dose CT scan once per year if you meet all of the following conditions: (1) Age 50-69; (2) No signs or symptoms of lung cancer; (3) Current smoker or have quit smoking within the last 15 years; (4) You have a tobacco smoking history of at least 30 pack years (packs per day x number of years you smoked); (5) You get a written order from a healthcare provider  3  Glaucoma Screening: covered annually if you're considered high risk: (1) You have diabetes OR (2) Family history of glaucoma OR (3)  aged 48 and older OR (3)  American aged 72 and older  3  Osteoporosis Screening: covered every 2 years if you meet one of the following conditions: (1) Have a vertebral abnormality; (2) On glucocorticoid therapy for more than 3 months; (3) Have primary hyperparathyroidism; (4) On osteoporosis medications and need to assess response to drug therapy  5  HIV Screening: covered annually if you're between the age of 12-76  Also covered annually if you are younger than 13 and older than 72 with risk factors for HIV infection  For pregnant patients, it is covered up to 3 times per pregnancy      Immunizations:  Immunization Recommendations   Influenza Vaccine Annual influenza vaccination during flu season is recommended for all persons aged >= 6 months who do not have contraindications   Pneumococcal Vaccine (Prevnar and Pneumovax)  * Prevnar = PCV13  * Pneumovax = PPSV23 Adults 25-60 years old: 1-3 doses may be recommended based on certain risk factors  Adults 72 years old: Prevnar (PCV13) vaccine recommended followed by Pneumovax (PPSV23) vaccine  If already received PPSV23 since turning 65, then PCV13 recommended at least one year after PPSV23 dose  Hepatitis B Vaccine 3 dose series if at intermediate or high risk (ex: diabetes, end stage renal disease, liver disease)   Tetanus (Td) Vaccine - COST NOT COVERED BY MEDICARE PART B Following completion of primary series, a booster dose should be given every 10 years to maintain immunity against tetanus  Td may also be given as tetanus wound prophylaxis  Tdap Vaccine - COST NOT COVERED BY MEDICARE PART B Recommended at least once for all adults  For pregnant patients, recommended with each pregnancy  Shingles Vaccine (Shingrix) - COST NOT COVERED BY MEDICARE PART B  2 shot series recommended in those aged 48 and above     Health Maintenance Due:  There are no preventive care reminders to display for this patient  Immunizations Due:      Topic Date Due    DTaP,Tdap,and Td Vaccines (1 - Tdap) 02/25/1951    Influenza Vaccine  07/01/2019     Advance Directives   What are advance directives? Advance directives are legal documents that state your wishes and plans for medical care  These plans are made ahead of time in case you lose your ability to make decisions for yourself  Advance directives can apply to any medical decision, such as the treatments you want, and if you want to donate organs  What are the types of advance directives? There are many types of advance directives, and each state has rules about how to use them  You may choose a combination of any of the following:  · Living will:   This is a written record of the treatment you want  You can also choose which treatments you do not want, which to limit, and which to stop at a certain time  This includes surgery, medicine, IV fluid, and tube feedings  · Durable power of  for healthcare [facility]): This is a written record that states who you want to make healthcare choices for you when you are unable to make them for yourself  This person, called a proxy, is usually a family member or a friend  You may choose more than 1 proxy  · Do not resuscitate (DNR) order:  A DNR order is used in case your heart stops beating or you stop breathing  It is a request not to have certain forms of treatment, such as CPR  A DNR order may be included in other types of advance directives  · Medical directive: This covers the care that you want if you are in a coma, near death, or unable to make decisions for yourself  You can list the treatments you want for each condition  Treatment may include pain medicine, surgery, blood transfusions, dialysis, IV or tube feedings, and a ventilator (breathing machine)  · Values history: This document has questions about your views, beliefs, and how you feel and think about life  This information can help others choose the care that you would choose  Why are advance directives important? An advance directive helps you control your care  Although spoken wishes may be used, it is better to have your wishes written down  Spoken wishes can be misunderstood, or not followed  Treatments may be given even if you do not want them  An advance directive may make it easier for your family to make difficult choices about your care  Weight Management   Why it is important to manage your weight:  Being overweight increases your risk of health conditions such as heart disease, high blood pressure, type 2 diabetes, and certain types of cancer  It can also increase your risk for osteoarthritis, sleep apnea, and other respiratory problems   Aim for a slow, steady weight loss  Even a small amount of weight loss can lower your risk of health problems  How to lose weight safely:  A safe and healthy way to lose weight is to eat fewer calories and get regular exercise  You can lose up about 1 pound a week by decreasing the number of calories you eat by 500 calories each day  Healthy meal plan for weight management:  A healthy meal plan includes a variety of foods, contains fewer calories, and helps you stay healthy  A healthy meal plan includes the following:  · Eat whole-grain foods more often  A healthy meal plan should contain fiber  Fiber is the part of grains, fruits, and vegetables that is not broken down by your body  Whole-grain foods are healthy and provide extra fiber in your diet  Some examples of whole-grain foods are whole-wheat breads and pastas, oatmeal, brown rice, and bulgur  · Eat a variety of vegetables every day  Include dark, leafy greens such as spinach, kale, sapna greens, and mustard greens  Eat yellow and orange vegetables such as carrots, sweet potatoes, and winter squash  · Eat a variety of fruits every day  Choose fresh or canned fruit (canned in its own juice or light syrup) instead of juice  Fruit juice has very little or no fiber  · Eat low-fat dairy foods  Drink fat-free (skim) milk or 1% milk  Eat fat-free yogurt and low-fat cottage cheese  Try low-fat cheeses such as mozzarella and other reduced-fat cheeses  · Choose meat and other protein foods that are low in fat  Choose beans or other legumes such as split peas or lentils  Choose fish, skinless poultry (chicken or turkey), or lean cuts of red meat (beef or pork)  Before you cook meat or poultry, cut off any visible fat  · Use less fat and oil  Try baking foods instead of frying them  Add less fat, such as margarine, sour cream, regular salad dressing and mayonnaise to foods  Eat fewer high-fat foods   Some examples of high-fat foods include french fries, doughnuts, ice cream, and cakes  · Eat fewer sweets  Limit foods and drinks that are high in sugar  This includes candy, cookies, regular soda, and sweetened drinks  Exercise:  Exercise at least 30 minutes per day on most days of the week  Some examples of exercise include walking, biking, dancing, and swimming  You can also fit in more physical activity by taking the stairs instead of the elevator or parking farther away from stores  Ask your healthcare provider about the best exercise plan for you  © Copyright [employer] 2018 Information is for End User's use only and may not be sold, redistributed or otherwise used for commercial purposes   All illustrations and images included in CareNotes® are the copyrighted property of [employer]  or [address] [place] [address]

## 2020-02-11 NOTE — PROGRESS NOTES
Assessment and Plan:     Problem List Items Addressed This Visit     None      Visit Diagnoses     Medicare annual wellness visit, subsequent    -  Primary        BMI Counseling: Body mass index is 36 19 kg/m²  The BMI is above normal  Nutrition recommendations include decreasing portion sizes, encouraging healthy choices of fruits and vegetables, moderation in carbohydrate intake and reducing intake of cholesterol  Exercise recommendations include exercising 3-5 times per week  No pharmacotherapy was ordered  Patient referred to PCP due to patient being overweight  Preventive health issues were discussed with patient, and age appropriate screening tests were ordered as noted in patient's After Visit Summary  Personalized health advice and appropriate referrals for health education or preventive services given if needed, as noted in patient's After Visit Summary       History of Present Illness:     Patient presents for Medicare Annual Wellness visit    Patient Care Team:  Gwendalyn Bosworth, MD as PCP - General (Family Medicine)  Ryan Stephen MD     Problem List:     Patient Active Problem List   Diagnosis    Essential hypertension    Personal history of prostate cancer    AKIRA (stress urinary incontinence), male    Bladder neck contracture      Past Medical and Surgical History:     Past Medical History:   Diagnosis Date    Bladder neck contracture     ED (erectile dysfunction)     Frequency of urination     Hypercholesterolemia     Hypertension     12/13/17    Nocturia     Prostate cancer ([address] )     12/8/16    Stress incontinence      Past Surgical History:   Procedure Laterality Date    BLADDER SURGERY      Bladder Neck Restriction, SP Catheter    CHOLECYSTECTOMY      CYSTOSTOMY W/ BLADDER DILATION      PROSTATECTOMY      Radical    URETHRAL SLING        Family History:     Family History   Problem Relation Age of Onset    Coronary artery disease Father         involving native coronary artery of native heart, angina presence unspecified      Social History:        Social History     Socioeconomic History    Marital status: /Civil Union     Spouse name: None    Number of children: None    Years of education: None    Highest education level: None   Occupational History    None   Social Needs    Financial resource strain: None    Food insecurity:     Worry: None     Inability: None    Transportation needs:     Medical: None     Non-medical: None   Tobacco Use    Smoking status: Former Smoker    Smokeless tobacco: Never Used    Tobacco comment: quit 35+ years ago   Substance and Sexual Activity    Alcohol use: No    Drug use: No    Sexual activity: None   Lifestyle    Physical activity:     Days per week: None     Minutes per session: None    Stress: None   Relationships    Social connections:     Talks on phone: None     Gets together: None     Attends [employer] service: None     Active member of club or organization: None     Attends meetings of clubs or organizations: None     Relationship status: None    Intimate partner violence:     Fear of current or ex partner: None     Emotionally abused: None     Physically abused: None     Forced sexual activity: None   Other Topics Concern    None   Social History Narrative    None      Medications and Allergies:     Current Outpatient Medications   Medication Sig Dispense Refill    aspirin 81 MG tablet Take by mouth      B Complex Vitamins (VITAMIN B COMPLEX PO) Take 1 capsule by mouth      Cholecalciferol 5000 units capsule Take 5,000 Units by mouth      Coenzyme Q10 (COQ10) 100 MG CAPS Take by mouth      FISH OIL-CANOLA OIL-VIT D3 PO by Does not apply route      lisinopril (ZESTRIL) 5 mg tablet TAKE ONE TABLET (5 MG TOTAL) BY MOUTH DAILY 90 tablet 0    Misc Natural Products (PANTETHINE PLUS) TABS Take by mouth      Red Yeast Rice 600 MG CAPS Take by mouth      Turmeric 500 MG TABS Take 500 mg by mouth daily       No current facility-administered medications for this visit  No Known Allergies   Immunizations:     Immunization History   Administered Date(s) Administered    Pneumococcal Conjugate 13-Valent 12/13/2017    Pneumococcal Polysaccharide PPV23 05/17/2007      Health Maintenance: There are no preventive care reminders to display for this patient  Topic Date Due    DTaP,Tdap,and Td Vaccines (1 - Tdap) 02/25/1951    Influenza Vaccine  07/01/2019      Medicare Health Risk Assessment:     /82 (BP Location: Left arm, Patient Position: Sitting, Cuff Size: Extra-Large)   Pulse 76   Temp 98 2 °F (36 8 °C) (Tympanic)   Ht 5' 8" (1 727 m)   Wt 108 kg (238 lb)   SpO2 95%   BMI 36 19 kg/m²      Deidra Billy is here for his Subsequent Wellness visit  Last Medicare Wellness visit information reviewed, patient interviewed, no change since last AWV  Health Risk Assessment:   Patient rates overall health as good  Patient feels that their physical health rating is same  Eyesight was rated as same  Hearing was rated as same  Patient feels that their emotional and mental health rating is same  Pain experienced in the last 7 days has been none  Patient states that he has experienced no weight loss or gain in last 6 months  Depression Screening:   PHQ-2 Score: 0      Fall Risk Screening: In the past year, patient has experienced: no history of falling in past year      Home Safety:  Patient does not have trouble with stairs inside or outside of their home  Patient has working smoke alarms and has working carbon monoxide detector  Home safety hazards include: none  Nutrition:   Current diet is Regular  Medications:   Patient is currently taking over-the-counter supplements   OTC medications include: see medication list      Activities of Daily Living (ADLs)/Instrumental Activities of Daily Living (IADLs):   Walk and transfer into and out of bed and chair?: Yes  Dress and groom yourself?: Yes    Bathe or shower yourself?: Yes    Feed yourself? Yes  Do your laundry/housekeeping?: Yes  Manage your money, pay your bills and track your expenses?: Yes  Make your own meals?: Yes    Do your own shopping?: Yes    Previous Hospitalizations:   Any hospitalizations or ED visits within the last 12 months?: No      Advance Care Planning:   Living will: Yes    Durable POA for healthcare: Yes    Advanced directive: Yes    Provider agrees with end of life decisions: Yes      Cognitive Screening:   Provider or family/friend/caregiver concerned regarding cognition?: No    PREVENTIVE SCREENINGS      Cardiovascular Screening:    General: Screening Not Indicated and History Lipid Disorder      Diabetes Screening:     General: Screening Current      Colorectal Cancer Screening:     General: Screening Not Indicated      Prostate Cancer Screening:    General: History Prostate Cancer and Screening Not Indicated      Osteoporosis Screening:    General: Screening Not Indicated      Abdominal Aortic Aneurysm (AAA) Screening:    Risk factors include: tobacco use        Lung Cancer Screening:     General: Screening Not Indicated      Hepatitis C Screening:    General: Screening Not Indicated    Other Counseling Topics:   Car/seat belt/driving safety and regular weightbearing exercise  Last colonoscopy aids 77-advise no further colonoscopies        Tash Sanderson MD

## 2020-02-11 NOTE — PROGRESS NOTES
Subjective:   Chief Complaint   Patient presents with   [facility] Wellness Visit     medicare/ back disconford        Patient ID: Ishmael Pierce is a 70 y o  male  Medical Management  1) hypertension good control  No cardiac symptoms  2) history of prostate cancer-very minimal elevated PSA continues to see Urology  3) leg pains-patient notes some anterior thigh pain up into the hip flexors  Once he gets up and moving a goes away  He is not sure whether related to increase physical activity  The following portions of the patient's history were reviewed and updated as appropriate: allergies, current medications, past family history, past medical history, past social history, past surgical history and problem list     Review of Systems   Constitutional: Negative for appetite change, chills, diaphoresis and fever  HENT: Negative for ear pain, rhinorrhea, sinus pressure and sore throat  Eyes: Negative for discharge, redness and itching  Respiratory: Negative for cough, shortness of breath and wheezing  Cardiovascular: Negative for chest pain and palpitations  Rapid or slow heart rate   Gastrointestinal: Negative for abdominal pain, diarrhea, nausea and vomiting  Neurological: Negative for dizziness, light-headedness and headaches  Psychiatric/Behavioral: Negative for dysphoric mood and sleep disturbance  The patient is not nervous/anxious  Objective:  Vitals:    02/11/20 0843   BP: 128/82   BP Location: Left arm   Patient Position: Sitting   Cuff Size: Extra-Large   Pulse: 76   Temp: 98 2 °F (36 8 °C)   TempSrc: Tympanic   SpO2: 95%   Weight: 108 kg (238 lb)   Height: 5' 8" (1 727 m)      Physical Exam   Constitutional: He is oriented to person, place, and time  He appears well-developed and well-nourished  No distress  HENT:   Head: Normocephalic and atraumatic  Right Ear: Tympanic membrane and external ear normal  No drainage     Left Ear: Tympanic membrane normal  No drainage  Mouth/Throat: No oropharyngeal exudate  Eyes: Conjunctivae and EOM are normal  Right eye exhibits no discharge  Left eye exhibits no discharge  Neck: Normal range of motion  Neck supple  No thyromegaly present  Cardiovascular: Normal rate, regular rhythm and normal heart sounds  Pulmonary/Chest: Effort normal  No respiratory distress  He has no wheezes  He has no rales  Musculoskeletal:   Hips have good range of motion without pain  There is some tenderness with flexion of the knee to the chest   Mostly this is in the proximal quad area  Lymphadenopathy:     He has no cervical adenopathy  Neurological: He is alert and oriented to person, place, and time  Psychiatric: He has a normal mood and affect  His behavior is normal          Assessment/Plan:    No problem-specific Assessment & Plan notes found for this encounter  Diagnoses and all orders for this visit:    Medicare annual wellness visit, subsequent    Essential hypertension  -     lisinopril (ZESTRIL) 5 mg tablet; Take 1 tablet (5 mg total) by mouth daily    Personal history of prostate cancer    Other orders  -     Turmeric 500 MG TABS; Take 500 mg by mouth daily        Continue current medication  Monitor blood pressure  Continue to watch diet, stay active, and achieve some weight loss  Follow-up with urology as advised  Bring a copy of your advanced directive when available

## 2020-08-31 NOTE — TELEPHONE ENCOUNTER
Jovanny Jackson    ED Visit Information     Ed visit date: 10/08/2018  Diagnosis Description: Urinary retention; UTI (urinary tract infection)  In [place] [facility]  [place] status: [place]  Discharged with meds ? Yes  Number of ED visits to date: 2  ED Severity:2     Outreach Information    Outreach successful: Yes 1  Date letter mailed:N/a  Date Finalized:10/11/2018    Care Coordination    Follow up appointment with specialilty: yes 10/17/2018   Transportation issues ? No    [employer] type:  7 Day Outreach  Emergent necessity warranted by diagnosis:  Yes  [facility] PCP:  Yes  Transportation:  Friend/Family Transport  Called PCP first?:  No  Feels able to call PCP for urgent problems ?:  Yes  Understands what emergencies can be handled by PCP ?:  Yes  Ever any problems getting appointment with PCP for minor emergency/urgency problems?:  No  Practice Contacted Patient ?:  No  Pt had ED follow up with pcp/staff ?:  No    Seen for follow-up out of network ?:  No  Reason Patient went to ED instead of Urgent Care or PCP?:  Perceived Severity of Illness  Urgent care Education?:  No  10/11/2018 12:17 PM Phone (Yhat) Tayler Soria (Self) [phone] (Z)   Call Complete      Personal communication with patient in regard to his recent ED visit on 10/08 for Urinary retention; UTI (urinary tract infection)  Patient stated that he has been experiencing bladder spasms that cause urinary retention  He went to the ED for catheterization  Patient stated that 27years old, he had prostate surgery and 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

## 2020-10-12 NOTE — TELEPHONE ENCOUNTER
Patient is calling stating he has not been feeling well for about the last two weeks . He has been in bed mostly with fever , sob , weakness, body aches . I offered an appointment but he wants to see what Dr Chacorta Alaniz would suggest due to him having multiple health issues . Please advise . Spoke to pt and he stated that the spasms are really bad he took oxybutnin @ 6am and still having constant spasms  Pt states that he is not voiding  Pt was given appt with nurse to check PVR

## 2020-11-30 ENCOUNTER — OFFICE VISIT (OUTPATIENT)
Dept: FAMILY MEDICINE CLINIC | Facility: CLINIC | Age: 80
End: 2020-11-30
Payer: COMMERCIAL

## 2020-11-30 VITALS
OXYGEN SATURATION: 97 % | SYSTOLIC BLOOD PRESSURE: 126 MMHG | TEMPERATURE: 96.8 F | DIASTOLIC BLOOD PRESSURE: 74 MMHG | WEIGHT: 250 LBS | BODY MASS INDEX: 37.89 KG/M2 | HEIGHT: 68 IN | HEART RATE: 66 BPM
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
## 2020-11-30 DIAGNOSIS — S30.860A TICK BITE OF BACK, INITIAL ENCOUNTER: ICD-10-CM

## 2020-11-30 DIAGNOSIS — Z23 NEED FOR INFLUENZA VACCINATION: Primary | ICD-10-CM

## 2020-11-30 DIAGNOSIS — I10 ESSENTIAL HYPERTENSION: ICD-10-CM

## 2020-11-30 PROCEDURE — 90662 IIV NO PRSV INCREASED AG IM: CPT

## 2020-11-30 PROCEDURE — 99213 OFFICE O/P EST LOW 20 MIN: CPT | Performed by: FAMILY MEDICINE

## 2020-11-30 PROCEDURE — G0008 ADMIN INFLUENZA VIRUS VAC: HCPCS

## 2020-11-30 RX ORDER — DOXYCYCLINE HYCLATE 100 MG/1
100 CAPSULE ORAL EVERY 12 HOURS SCHEDULED
Qty: 20 CAPSULE | Refills: 1 | Status: SHIPPED | OUTPATIENT
Start: 2020-11-30 | End: 2020-12-10

## 2021-01-19 ENCOUNTER — IMMUNIZATIONS (OUTPATIENT)
Dept: FAMILY MEDICINE CLINIC | Facility: HOSPITAL | Age: 81
End: 2021-01-19

## 2021-01-19 DIAGNOSIS — Z23 ENCOUNTER FOR IMMUNIZATION: Primary | ICD-10-CM

## 2021-01-19 PROCEDURE — 91301 SARS-COV-2 / COVID-19 MRNA VACCINE (MODERNA) 100 MCG: CPT

## 2021-01-19 PROCEDURE — 0011A SARS-COV-2 / COVID-19 MRNA VACCINE (MODERNA) 100 MCG: CPT

## 2021-02-17 ENCOUNTER — IMMUNIZATIONS (OUTPATIENT)
Dept: FAMILY MEDICINE CLINIC | Facility: HOSPITAL | Age: 81
End: 2021-02-17

## 2021-02-17 DIAGNOSIS — Z23 ENCOUNTER FOR IMMUNIZATION: Primary | ICD-10-CM

## 2021-02-17 PROCEDURE — 0012A SARS-COV-2 / COVID-19 MRNA VACCINE (MODERNA) 100 MCG: CPT

## 2021-02-17 PROCEDURE — 91301 SARS-COV-2 / COVID-19 MRNA VACCINE (MODERNA) 100 MCG: CPT

## 2021-03-15 DIAGNOSIS — I10 ESSENTIAL HYPERTENSION: ICD-10-CM

## 2021-03-15 RX ORDER — LISINOPRIL 5 MG/1
TABLET ORAL
Qty: 90 TABLET | Refills: 2 | Status: SHIPPED | OUTPATIENT
Start: 2021-03-15 | End: 2021-12-22 | Stop reason: SDUPTHER

## 2021-06-16 ENCOUNTER — RA CDI HCC (OUTPATIENT)
Dept: OTHER | Facility: HOSPITAL | Age: 81
End: 2021-06-16

## 2021-06-17 NOTE — PROGRESS NOTES
Bernardo [facility]  coding opportunities          Chart reviewed, no opportunity found: CHART REVIEWED, NO OPPORTUNITY FOUND                     Patients insurance company: [employer]

## 2021-06-21 ENCOUNTER — OFFICE VISIT (OUTPATIENT)
Dept: FAMILY MEDICINE CLINIC | Facility: CLINIC | Age: 81
End: 2021-06-21
Payer: COMMERCIAL

## 2021-06-21 VITALS
DIASTOLIC BLOOD PRESSURE: 84 MMHG | HEIGHT: 67 IN | HEART RATE: 65 BPM | OXYGEN SATURATION: 97 % | BODY MASS INDEX: 39.87 KG/M2 | SYSTOLIC BLOOD PRESSURE: 114 MMHG | TEMPERATURE: 97.5 F | WEIGHT: 254 LBS

## 2021-06-21 DIAGNOSIS — I10 ESSENTIAL HYPERTENSION: ICD-10-CM

## 2021-06-21 DIAGNOSIS — Z00.00 MEDICARE ANNUAL WELLNESS VISIT, SUBSEQUENT: Primary | ICD-10-CM

## 2021-06-21 DIAGNOSIS — E66.09 CLASS 2 OBESITY DUE TO EXCESS CALORIES WITHOUT SERIOUS COMORBIDITY WITH BODY MASS INDEX (BMI) OF 39.0 TO 39.9 IN ADULT: ICD-10-CM

## 2021-06-21 DIAGNOSIS — G56.03 BILATERAL CARPAL TUNNEL SYNDROME: ICD-10-CM

## 2021-06-21 DIAGNOSIS — Z85.46 PERSONAL HISTORY OF PROSTATE CANCER: ICD-10-CM

## 2021-06-21 PROBLEM — E66.812 CLASS 2 OBESITY DUE TO EXCESS CALORIES WITHOUT SERIOUS COMORBIDITY WITH BODY MASS INDEX (BMI) OF 39.0 TO 39.9 IN ADULT: Status: ACTIVE | Noted: 2021-06-21

## 2021-06-21 PROBLEM — E66.01 OBESITY, MORBID (HCC): Status: ACTIVE | Noted: 2021-06-21

## 2021-06-21 PROCEDURE — 3725F SCREEN DEPRESSION PERFORMED: CPT | Performed by: FAMILY MEDICINE

## 2021-06-21 PROCEDURE — 1170F FXNL STATUS ASSESSED: CPT | Performed by: FAMILY MEDICINE

## 2021-06-21 PROCEDURE — 1125F AMNT PAIN NOTED PAIN PRSNT: CPT | Performed by: FAMILY MEDICINE

## 2021-06-21 PROCEDURE — 3288F FALL RISK ASSESSMENT DOCD: CPT | Performed by: FAMILY MEDICINE

## 2021-06-21 PROCEDURE — 3079F DIAST BP 80-89 MM HG: CPT | Performed by: FAMILY MEDICINE

## 2021-06-21 PROCEDURE — G0439 PPPS, SUBSEQ VISIT: HCPCS | Performed by: FAMILY MEDICINE

## 2021-06-21 PROCEDURE — 99214 OFFICE O/P EST MOD 30 MIN: CPT | Performed by: FAMILY MEDICINE

## 2021-06-21 PROCEDURE — 1036F TOBACCO NON-USER: CPT | Performed by: FAMILY MEDICINE

## 2021-06-21 PROCEDURE — 1160F RVW MEDS BY RX/DR IN RCRD: CPT | Performed by: FAMILY MEDICINE

## 2021-06-21 PROCEDURE — 3074F SYST BP LT 130 MM HG: CPT | Performed by: FAMILY MEDICINE

## 2021-06-21 NOTE — PROGRESS NOTES
Assessment and Plan:     Problem List Items Addressed This Visit     None      Visit Diagnoses     Medicare annual wellness visit, subsequent    -  Primary        BMI Counseling: Body mass index is 39 78 kg/m²  The BMI is above normal  Nutrition recommendations include decreasing portion sizes, moderation in carbohydrate intake and reducing intake of cholesterol  Exercise recommendations include moderate physical activity 150 minutes/week  No pharmacotherapy was ordered  Preventive health issues were discussed with patient, and age appropriate screening tests were ordered as noted in patient's After Visit Summary  Personalized health advice and appropriate referrals for health education or preventive services given if needed, as noted in patient's After Visit Summary       History of Present Illness:     Patient presents for Medicare Annual Wellness visit    Patient Care Team:  Sabrina Goncalves MD as PCP - General (Family Medicine)  Sabrina Goncalves MD as PCP - [address] [place] [address] (RT)  Lillian Sandra MD     Problem List:     Patient Active Problem List   Diagnosis    Essential hypertension    Personal history of prostate cancer    AKIRA (stress urinary incontinence), male    Bladder neck contracture      Past Medical and Surgical History:     Past Medical History:   Diagnosis Date    Bladder neck contracture     ED (erectile dysfunction)     Frequency of urination     Hypercholesterolemia     Hypertension     12/13/17    Nocturia     Prostate cancer ([address] )     12/8/16    Stress incontinence      Past Surgical History:   Procedure Laterality Date    BLADDER SURGERY      Bladder Neck Restriction, SP Catheter    CHOLECYSTECTOMY      CYSTOSTOMY W/ BLADDER DILATION      PROSTATECTOMY      Radical    URETHRAL SLING        Family History:     Family History   Problem Relation Age of Onset    Coronary artery disease Father         involving native coronary artery of native heart, angina presence unspecified      Social History:     Social History     Socioeconomic History    Marital status: /Civil Union     Spouse name: None    Number of children: None    Years of education: None    Highest education level: None   Occupational History    None   Tobacco Use    Smoking status: Former Smoker    Smokeless tobacco: Never Used    Tobacco comment: quit 35+ years ago   Substance and Sexual Activity    Alcohol use: No    Drug use: No    Sexual activity: None   Other Topics Concern    None   Social History Narrative    None     Social Determinants of Health     Financial Resource Strain:     Difficulty of Paying Living Expenses:    Food Insecurity:     Worried About Running Out of Food in the Last Year:     Ran Out of Food in the Last Year:    Transportation Needs:     Lack of Transportation (Medical):      Lack of Transportation (Non-Medical):    Physical Activity:     Days of Exercise per Week:     Minutes of Exercise per Session:    Stress:     Feeling of Stress :    Social Connections:     Frequency of Communication with Friends and Family:     Frequency of Social Gatherings with Friends and Family:     Attends [employer] Services:     Active Member of Clubs or Organizations:     Attends Club or Organization Meetings:     Marital Status:    Intimate Partner Violence:     Fear of Current or Ex-Partner:     Emotionally Abused:     Physically Abused:     Sexually Abused:       Medications and Allergies:     Current Outpatient Medications   Medication Sig Dispense Refill    aspirin 81 MG tablet Take by mouth      B Complex Vitamins (VITAMIN B COMPLEX PO) Take 1 capsule by mouth      Cholecalciferol 5000 units capsule Take 5,000 Units by mouth      Coenzyme Q10 (COQ10) 100 MG CAPS Take by mouth      FISH OIL-CANOLA OIL-VIT D3 PO by Does not apply route      lisinopril (ZESTRIL) 5 mg tablet TAKE ONE BY MOUTH DAILY 90 tablet 2    Misc Natural Products (PANTETHINE PLUS) TABS Take by mouth      Red Yeast Rice 600 MG CAPS Take by mouth      Turmeric 500 MG TABS Take 500 mg by mouth daily (Patient not taking: Reported on 6/21/2021)       No current facility-administered medications for this visit  No Known Allergies   Immunizations:     Immunization History   Administered Date(s) Administered    Influenza, high dose seasonal 0 7 mL 11/30/2020    Pneumococcal Conjugate 13-Valent 12/13/2017    Pneumococcal Polysaccharide PPV23 05/17/2007    SARS-CoV-2 / COVID-19 mRNA IM (Zaina Pillar) 01/19/2021, 02/17/2021      Health Maintenance:         Topic Date Due    Hepatitis C Screening  Never done         Topic Date Due    DTaP,Tdap,and Td Vaccines (1 - Tdap) Never done      Medicare Health Risk Assessment:     /74 (BP Location: Right arm, Patient Position: Sitting, Cuff Size: Large)   Pulse 65   Temp 97 5 °F (36 4 °C) (Tympanic)   Ht 5' 7" (1 702 m)   Wt 115 kg (254 lb)   SpO2 97%   BMI 39 78 kg/m²      Le Gonzales is here for his Subsequent Wellness visit  Last Medicare Wellness visit information reviewed, patient interviewed and updates made to the record today  Health Risk Assessment:   Patient rates overall health as good  Patient feels that their physical health rating is slightly worse  Patient is very satisfied with their life  Eyesight was rated as slightly worse  Hearing was rated as slightly worse  Patient feels that their emotional and mental health rating is same  Patients states they are sometimes angry  Patient states they are sometimes unusually tired/fatigued  Pain experienced in the last 7 days has been a lot  Patient's pain rating has been 8/10  Patient states that he has experienced weight loss or gain in last 6 months  Pain related to bilateral carpal tunnel syndrome  Being evaluated for potential carpal tunnel release  Depression Screening:   PHQ-2 Score: 0      Fall Risk Screening:    In the past year, patient has experienced: no history of falling in past year      Home Safety:  Patient does not have trouble with stairs inside or outside of their home  Patient has working smoke alarms and has working carbon monoxide detector  Home safety hazards include: loose rugs on the floor  Nutrition:   Current diet is Regular  Medications:   Patient is currently taking over-the-counter supplements  OTC medications include: see medication list  Patient is able to manage medications  Activities of Daily Living (ADLs)/Instrumental Activities of Daily Living (IADLs):   Walk and transfer into and out of bed and chair?: Yes  Dress and groom yourself?: Yes    Bathe or shower yourself?: Yes    Feed yourself? Yes  Do your laundry/housekeeping?: Yes  Manage your money, pay your bills and track your expenses?: Yes  Make your own meals?: Yes    Do your own shopping?: Yes    Previous Hospitalizations:   Any hospitalizations or ED visits within the last 12 months?: No      Advance Care Planning:   Living will: Yes    Durable POA for healthcare: Yes    Advanced directive: Yes    Provider agrees with end of life decisions: Yes      Cognitive Screening:   Provider or family/friend/caregiver concerned regarding cognition?: No    PREVENTIVE SCREENINGS      Cardiovascular Screening:    General: Screening Current      Colorectal Cancer Screening:     General: Screening Current      Prostate Cancer Screening:    General: History Prostate Cancer and Screening Not Indicated      Osteoporosis Screening:    General: Screening Not Indicated      Abdominal Aortic Aneurysm (AAA) Screening:    Risk factors include: tobacco use        Lung Cancer Screening:     General: Screening Not Indicated      Preventive Screening Comments: Colonoscopy 2017 with only hyperplastic polyp being found      Screening, Brief Intervention, and Referral to Treatment (SBIRT)    Screening  Typical number of drinks in a day: 0  Typical number of drinks in a week: 0  Interpretation: Low risk drinking behavior  Single Item Drug Screening:  How often have you used an illegal drug (including marijuana) or a prescription medication for non-medical reasons in the past year? never    Single Item Drug Screen Score: 0  Interpretation: Negative screen for possible drug use disorder    Brief Intervention  Alcohol & drug use screenings were reviewed  No concerns regarding substance use disorder identified  Other Counseling Topics:   Car/seat belt/driving safety and regular weightbearing exercise         Gail Mcnair MD

## 2021-06-21 NOTE — PATIENT INSTRUCTIONS
Medical management-continue current medications for blood pressure  Monitor blood pressure at home with goal less than 135/85  Check labs to include CBC and CMP if not part of Urology labs  Follow-up urologist   Follow-up with hand surgeon  Follow-up with back doctor  Recheck here 6 months  Medicare Preventive Visit Patient Instructions  Thank you for completing your Welcome to Medicare Visit or Medicare Annual Wellness Visit today  Your next wellness visit will be due in one year (6/22/2022)  The screening/preventive services that you may require over the next 5-10 years are detailed below  Some tests may not apply to you based off risk factors and/or age  Screening tests ordered at today's visit but not completed yet may show as past due  Also, please note that scanned in results may not display below  Preventive Screenings:  Service Recommendations Previous Testing/Comments   Colorectal Cancer Screening  · Colonoscopy    · Fecal Occult Blood Test (FOBT)/Fecal Immunochemical Test (FIT)  · Fecal DNA/Cologuard Test  · Flexible Sigmoidoscopy Age: 54-65 years old   Colonoscopy: every 10 years (May be performed more frequently if at higher risk)  OR  FOBT/FIT: every 1 year  OR  Cologuard: every 3 years  OR  Sigmoidoscopy: every 5 years  Screening may be recommended earlier than age 48 if at higher risk for colorectal cancer  Also, an individualized decision between you and your healthcare provider will decide whether screening between the ages of 74-80 would be appropriate   Colonoscopy: Not on file  FOBT/FIT: Not on file  Cologuard: Not on file  Sigmoidoscopy: Not on file          Prostate Cancer Screening Individualized decision between patient and health care provider in men between ages of 53-78   Medicare will cover every 12 months beginning on the day after your 50th birthday PSA: No results in last 5 years     History Prostate Cancer  Screening Not Indicated     Hepatitis C Screening Once for adults born between [facility]  More frequently in patients at high risk for Hepatitis C Hep C Antibody: Not on file        Diabetes Screening 1-2 times per year if you're at risk for diabetes or have pre-diabetes Fasting glucose: No results in last 5 years   A1C: No results in last 5 years        Cholesterol Screening Once every 5 years if you don't have a lipid disorder  May order more often based on risk factors  Lipid panel: 02/04/2020    Screening Current      Other Preventive Screenings Covered by Medicare:  1  Abdominal Aortic Aneurysm (AAA) Screening: covered once if your at risk  You're considered to be at risk if you have a family history of AAA or a male between the age of 73-68 who smoking at least 100 cigarettes in your lifetime  2  Lung Cancer Screening: covers low dose CT scan once per year if you meet all of the following conditions: (1) Age 50-69; (2) No signs or symptoms of lung cancer; (3) Current smoker or have quit smoking within the last 15 years; (4) You have a tobacco smoking history of at least 30 pack years (packs per day x number of years you smoked); (5) You get a written order from a healthcare provider  3  Glaucoma Screening: covered annually if you're considered high risk: (1) You have diabetes OR (2) Family history of glaucoma OR (3)  aged 48 and older OR (3)  American aged 72 and older  3  Osteoporosis Screening: covered every 2 years if you meet one of the following conditions: (1) Have a vertebral abnormality; (2) On glucocorticoid therapy for more than 3 months; (3) Have primary hyperparathyroidism; (4) On osteoporosis medications and need to assess response to drug therapy  5  HIV Screening: covered annually if you're between the age of 12-76  Also covered annually if you are younger than 13 and older than 72 with risk factors for HIV infection  For pregnant patients, it is covered up to 3 times per pregnancy      Immunizations:  Immunization Recommendations Influenza Vaccine Annual influenza vaccination during flu season is recommended for all persons aged >= 6 months who do not have contraindications   Pneumococcal Vaccine (Prevnar and Pneumovax)  * Prevnar = PCV13  * Pneumovax = PPSV23 Adults 25-60 years old: 1-3 doses may be recommended based on certain risk factors  Adults 72 years old: Prevnar (PCV13) vaccine recommended followed by Pneumovax (PPSV23) vaccine  If already received PPSV23 since turning 65, then PCV13 recommended at least one year after PPSV23 dose  Hepatitis B Vaccine 3 dose series if at intermediate or high risk (ex: diabetes, end stage renal disease, liver disease)   Tetanus (Td) Vaccine - COST NOT COVERED BY MEDICARE PART B Following completion of primary series, a booster dose should be given every 10 years to maintain immunity against tetanus  Td may also be given as tetanus wound prophylaxis  Tdap Vaccine - COST NOT COVERED BY MEDICARE PART B Recommended at least once for all adults  For pregnant patients, recommended with each pregnancy  Shingles Vaccine (Shingrix) - COST NOT COVERED BY MEDICARE PART B  2 shot series recommended in those aged 48 and above     Health Maintenance Due:      Topic Date Due    Hepatitis C Screening  Never done     Immunizations Due:      Topic Date Due    DTaP,Tdap,and Td Vaccines (1 - Tdap) Never done     Advance Directives   What are advance directives? Advance directives are legal documents that state your wishes and plans for medical care  These plans are made ahead of time in case you lose your ability to make decisions for yourself  Advance directives can apply to any medical decision, such as the treatments you want, and if you want to donate organs  What are the types of advance directives? There are many types of advance directives, and each state has rules about how to use them  You may choose a combination of any of the following:  · Living will:   This is a written record of the treatment you want  You can also choose which treatments you do not want, which to limit, and which to stop at a certain time  This includes surgery, medicine, IV fluid, and tube feedings  · Durable power of  for healthcare [facility]): This is a written record that states who you want to make healthcare choices for you when you are unable to make them for yourself  This person, called a proxy, is usually a family member or a friend  You may choose more than 1 proxy  · Do not resuscitate (DNR) order:  A DNR order is used in case your heart stops beating or you stop breathing  It is a request not to have certain forms of treatment, such as CPR  A DNR order may be included in other types of advance directives  · Medical directive: This covers the care that you want if you are in a coma, near death, or unable to make decisions for yourself  You can list the treatments you want for each condition  Treatment may include pain medicine, surgery, blood transfusions, dialysis, IV or tube feedings, and a ventilator (breathing machine)  · Values history: This document has questions about your views, beliefs, and how you feel and think about life  This information can help others choose the care that you would choose  Why are advance directives important? An advance directive helps you control your care  Although spoken wishes may be used, it is better to have your wishes written down  Spoken wishes can be misunderstood, or not followed  Treatments may be given even if you do not want them  An advance directive may make it easier for your family to make difficult choices about your care  Weight Management   Why it is important to manage your weight:  Being overweight increases your risk of health conditions such as heart disease, high blood pressure, type 2 diabetes, and certain types of cancer  It can also increase your risk for osteoarthritis, sleep apnea, and other respiratory problems   Aim for a slow, steady weight loss  Even a small amount of weight loss can lower your risk of health problems  How to lose weight safely:  A safe and healthy way to lose weight is to eat fewer calories and get regular exercise  You can lose up about 1 pound a week by decreasing the number of calories you eat by 500 calories each day  Healthy meal plan for weight management:  A healthy meal plan includes a variety of foods, contains fewer calories, and helps you stay healthy  A healthy meal plan includes the following:  · Eat whole-grain foods more often  A healthy meal plan should contain fiber  Fiber is the part of grains, fruits, and vegetables that is not broken down by your body  Whole-grain foods are healthy and provide extra fiber in your diet  Some examples of whole-grain foods are whole-wheat breads and pastas, oatmeal, brown rice, and bulgur  · Eat a variety of vegetables every day  Include dark, leafy greens such as spinach, kale, sapna greens, and mustard greens  Eat yellow and orange vegetables such as carrots, sweet potatoes, and winter squash  · Eat a variety of fruits every day  Choose fresh or canned fruit (canned in its own juice or light syrup) instead of juice  Fruit juice has very little or no fiber  · Eat low-fat dairy foods  Drink fat-free (skim) milk or 1% milk  Eat fat-free yogurt and low-fat cottage cheese  Try low-fat cheeses such as mozzarella and other reduced-fat cheeses  · Choose meat and other protein foods that are low in fat  Choose beans or other legumes such as split peas or lentils  Choose fish, skinless poultry (chicken or turkey), or lean cuts of red meat (beef or pork)  Before you cook meat or poultry, cut off any visible fat  · Use less fat and oil  Try baking foods instead of frying them  Add less fat, such as margarine, sour cream, regular salad dressing and mayonnaise to foods  Eat fewer high-fat foods   Some examples of high-fat foods include french fries, doughnuts, ice cream, and cakes   · Eat fewer sweets  Limit foods and drinks that are high in sugar  This includes candy, cookies, regular soda, and sweetened drinks  Exercise:  Exercise at least 30 minutes per day on most days of the week  Some examples of exercise include walking, biking, dancing, and swimming  You can also fit in more physical activity by taking the stairs instead of the elevator or parking farther away from stores  Ask your healthcare provider about the best exercise plan for you  © Copyright [employer] 2018 Information is for End User's use only and may not be sold, redistributed or otherwise used for commercial purposes   All illustrations and images included in CareNotes® are the copyrighted property of [employer]  or [address] [place] [address]

## 2021-06-21 NOTE — PROGRESS NOTES
Subjective:   Chief Complaint   Patient presents with    Medicare Wellness Visit        Patient ID: Heri Boone is a 80 y o  male  Medical management multiple issues-  1) hypertension-good control current medication  2) carpal tunnel syndrome  Seen answered at [facility] for presumed endoscopic release  3) obesity-patient has lost several lb but still class to obese  4) prostate cancer-patient is now post treatment 20 years  Continues to follow with urology  No evidence of recurrent symptoms  The following portions of the patient's history were reviewed and updated as appropriate: allergies, current medications, past family history, past medical history, past social history, past surgical history and problem list     Review of Systems   Constitutional: Negative for activity change, appetite change, diaphoresis and fatigue  Respiratory: Negative for cough, chest tightness, shortness of breath and wheezing  Cardiovascular: Negative for chest pain, palpitations and leg swelling  Fast or slow heart rate   Gastrointestinal: Negative for abdominal pain, blood in stool, constipation, diarrhea, nausea and vomiting  Genitourinary: Negative for difficulty urinating, dysuria, frequency and hematuria  Musculoskeletal: Negative for arthralgias, gait problem, joint swelling and myalgias  Neurological: Negative for dizziness, weakness, light-headedness, numbness and headaches  Psychiatric/Behavioral: Negative for agitation, confusion, dysphoric mood and sleep disturbance  The patient is not nervous/anxious  Objective:  Vitals:    06/21/21 1028 06/21/21 1125   BP: 145/74 114/84   BP Location: Right arm    Patient Position: Sitting    Cuff Size: Large    Pulse: 65    Temp: 97 5 °F (36 4 °C)    TempSrc: Tympanic    SpO2: 97%    Weight: 115 kg (254 lb)    Height: 5' 7" (1 702 m)       Physical Exam  Vitals reviewed     Constitutional:       General: He is not in acute distress  Appearance: He is well-developed  HENT:      Head: Normocephalic and atraumatic  Right Ear: No drainage  Left Ear: No drainage  Mouth/Throat:      Pharynx: No oropharyngeal exudate  Eyes:      General:         Right eye: No discharge  Left eye: No discharge  Conjunctiva/sclera: Conjunctivae normal    Neck:      Thyroid: No thyromegaly  Cardiovascular:      Rate and Rhythm: Normal rate and regular rhythm  Heart sounds: Normal heart sounds  Pulmonary:      Effort: Pulmonary effort is normal  No respiratory distress  Breath sounds: No wheezing or rales  Musculoskeletal:      Cervical back: Normal range of motion and neck supple  Right lower leg: No edema  Left lower leg: No edema  Comments: Bilateral evidence of carpal tunnel syndrome with Phalen and Tinel's testing  There is decreased sensation of 1st 3 digits  Lymphadenopathy:      Cervical: No cervical adenopathy  Skin:     General: Skin is warm  Psychiatric:         Mood and Affect: Mood normal          Behavior: Behavior normal            Assessment/Plan:    No problem-specific Assessment & Plan notes found for this encounter  Diagnoses and all orders for this visit:    Medicare annual wellness visit, subsequent    Essential hypertension  -     CBC and differential; Future  -     Comprehensive metabolic panel; Future    Personal history of prostate cancer    Class 2 obesity due to excess calories without serious comorbidity with body mass index (BMI) of 39 0 to 39 9 in adult    Bilateral carpal tunnel syndrome        Medical management-continue current medications for blood pressure  Monitor blood pressure at home with goal less than 135/85  Check labs to include CBC and CMP if not part of Urology labs  Follow-up urologist   Follow-up with hand surgeon  Follow-up with back doctor  Recheck here 6 months

## 2021-07-08 ENCOUNTER — TELEPHONE (OUTPATIENT)
Dept: FAMILY MEDICINE CLINIC | Facility: CLINIC | Age: 81
End: 2021-07-08

## 2021-07-08 NOTE — LETTER
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 and external beam radiation therapy with detectable but stable and low PSA  The patient will continue with yearly PSA values    2  Bladder neck contracture-voiding trial today with repeat cystoscopy and possible dilation and office in 3 months with PVR check    3  Severe stress urinary incontinence continue present management     Diagnoses and all orders for this visit:    Bladder neck contracture    Personal history of prostate cancer          Subjective:     Patient ID: Ruth Flowers is a 70 y o  male  Chief complaint:  Bladder neck contracture    History of present illness:  42-year-old male well known to me status post radical prostatectomy for adenocarcinoma of the prostate with external beam radiation therapy thereafter for biochemical recurrence  The patient currently has a detectable PSA however the PSA value was very low and there is no upward trend  At the present time I cannot identify any evidence of recurrent prostate cancer  The patient however has a very noncompliant bladder neck and has significant intrinsic sphincter deficiency/stress urinary incontinence    The patient has been managed with a Cunningham clamp and a protective undergarment although he has had multiple discussions concerning artificial urinary sphincter placement and in fact has had male Have Your Skin Lesions Been Treated?: not been treated sling surgery which failed  The patient was last seen by me in June of 2018 at which time cystourethroscopy revealed a bladder neck contracture however the 25 Slovak scope was able to traverse the bladder outlet and no dilation took place  The patient noted that just prior to Labor Day this summer he developed weakening of the urinary stream and became fully continent of urine  Soon thereafter the patient was unable to void and presented at which time Barajas catheter was inserted in the emergency room  The Barajas was then removed and the patient experienced bladder spasms presented to the office and was seen by Dr Michela Hutson who perform cystourethroscopy and identified a deep bulbar/membranous urethral stricture dilated to 18 Slovak inserted a Barajas catheter despite a PVR of only 113 cc but because of severe bladder spasms  The spasm is resolved and the patient now presents to the office for further treatment and planning  Review of Systems   Constitutional: Positive for activity change and fatigue  HENT: Negative  Respiratory: Negative  Cardiovascular: Negative  Gastrointestinal: Negative  Genitourinary: Positive for difficulty urinating  Musculoskeletal: Positive for arthralgias  Neurological: Negative  Psychiatric/Behavioral: The patient is nervous/anxious  Objective:     Physical Exam   Constitutional: He is oriented to person, place, and time  He appears well-developed and well-nourished  No distress  HENT:   Head: Atraumatic  Eyes: EOM are normal    Neck: Neck supple  Pulmonary/Chest: Effort normal  No respiratory distress  Abdominal: Soft  Neurological: He is alert and oriented to person, place, and time  Psychiatric: His behavior is normal  Judgment and thought content normal     Anxious, solemn   Vitals reviewed  Is This A New Presentation, Or A Follow-Up?: Skin Lesions Additional History: Patient comes in for a growth on the right forearm area, and then what the patient thinks is two little cyst underneath the skin behind the ear.

## 2021-07-28 ENCOUNTER — TELEPHONE (OUTPATIENT)
Dept: FAMILY MEDICINE CLINIC | Facility: CLINIC | Age: 81
End: 2021-07-28

## 2021-07-28 NOTE — TELEPHONE ENCOUNTER
----- Message from Ree Antonio MD sent at 7/27/2021  4:40 PM EDT -----  Call patient with lab result-normal, continue same meds

## 2021-08-09 ENCOUNTER — TELEPHONE (OUTPATIENT)
Dept: FAMILY MEDICINE CLINIC | Facility: CLINIC | Age: 81
End: 2021-08-09

## 2021-08-09 DIAGNOSIS — G57.92 NEUROPATHY OF LEFT LOWER EXTREMITY: Primary | ICD-10-CM

## 2021-08-09 NOTE — TELEPHONE ENCOUNTER
Pt states that he has a burning sensation in his left leg--pt states that this spoken about in his last ov 6/21--pt is requesting what are the next steps--pt states if he has to make an appt to see you he will

## 2021-08-10 RX ORDER — GABAPENTIN 100 MG/1
100 CAPSULE ORAL 3 TIMES DAILY
Qty: 90 CAPSULE | Refills: 0 | Status: SHIPPED | OUTPATIENT
Start: 2021-08-10 | End: 2021-11-08 | Stop reason: ALTCHOICE

## 2021-08-11 NOTE — TELEPHONE ENCOUNTER
[address]ell did do her Incentive Spirometry reaching volumes of 1250ml, but, only 5 breaths instead of ten  "I don't feel like it " Did come out for HS snack, had HS medicine except the Singulair  Wearing now her QHS humidified nasal O2 @ 1L for bed  Pt has appt scheduled 8/17 to discuss concerns about medication that was prescribed

## 2021-10-20 ENCOUNTER — CONSULT (OUTPATIENT)
Dept: PAIN MEDICINE | Facility: CLINIC | Age: 81
End: 2021-10-20
Payer: COMMERCIAL

## 2021-10-20 VITALS
DIASTOLIC BLOOD PRESSURE: 80 MMHG | BODY MASS INDEX: 39.71 KG/M2 | HEIGHT: 68 IN | HEART RATE: 70 BPM | TEMPERATURE: 98 F | WEIGHT: 262 LBS | SYSTOLIC BLOOD PRESSURE: 148 MMHG

## 2021-10-20 DIAGNOSIS — E66.09 CLASS 2 OBESITY DUE TO EXCESS CALORIES WITHOUT SERIOUS COMORBIDITY WITH BODY MASS INDEX (BMI) OF 39.0 TO 39.9 IN ADULT: ICD-10-CM

## 2021-10-20 DIAGNOSIS — G57.12 MERALGIA PARESTHETICA OF LEFT SIDE: ICD-10-CM

## 2021-10-20 DIAGNOSIS — M54.16 LUMBAR RADICULOPATHY: ICD-10-CM

## 2021-10-20 DIAGNOSIS — M51.26 PROTRUSION OF LUMBAR INTERVERTEBRAL DISC: Primary | ICD-10-CM

## 2021-10-20 PROCEDURE — 3077F SYST BP >= 140 MM HG: CPT | Performed by: ANESTHESIOLOGY

## 2021-10-20 PROCEDURE — 3079F DIAST BP 80-89 MM HG: CPT | Performed by: ANESTHESIOLOGY

## 2021-10-20 PROCEDURE — 99204 OFFICE O/P NEW MOD 45 MIN: CPT | Performed by: ANESTHESIOLOGY

## 2021-10-20 PROCEDURE — 1036F TOBACCO NON-USER: CPT | Performed by: ANESTHESIOLOGY

## 2021-10-29 ENCOUNTER — HOSPITAL ENCOUNTER (OUTPATIENT)
Dept: RADIOLOGY | Facility: CLINIC | Age: 81
Discharge: HOME | End: 2021-10-29
Attending: ANESTHESIOLOGY | Admitting: ANESTHESIOLOGY
Payer: COMMERCIAL

## 2021-10-29 VITALS
HEART RATE: 63 BPM | RESPIRATION RATE: 20 BRPM | TEMPERATURE: 96.8 F | DIASTOLIC BLOOD PRESSURE: 70 MMHG | OXYGEN SATURATION: 94 % | SYSTOLIC BLOOD PRESSURE: 145 MMHG

## 2021-10-29 DIAGNOSIS — M54.16 LUMBAR RADICULOPATHY: ICD-10-CM

## 2021-10-29 DIAGNOSIS — M51.26 PROTRUSION OF LUMBAR INTERVERTEBRAL DISC: ICD-10-CM

## 2021-10-29 PROCEDURE — 62323 NJX INTERLAMINAR LMBR/SAC: CPT | Performed by: ANESTHESIOLOGY

## 2021-10-29 RX ORDER — METHYLPREDNISOLONE ACETATE 80 MG/ML
80 INJECTION, SUSPENSION INTRA-ARTICULAR; INTRALESIONAL; INTRAMUSCULAR; PARENTERAL; SOFT TISSUE ONCE
Status: COMPLETED | OUTPATIENT
Start: 2021-10-29 | End: 2021-10-29

## 2021-10-29 RX ADMIN — IOHEXOL 1 ML: 300 INJECTION, SOLUTION INTRAVENOUS at 14:22

## 2021-10-29 RX ADMIN — METHYLPREDNISOLONE ACETATE 80 MG: 80 INJECTION, SUSPENSION INTRA-ARTICULAR; INTRALESIONAL; INTRAMUSCULAR; SOFT TISSUE at 14:22

## 2021-11-04 ENCOUNTER — TELEPHONE (OUTPATIENT)
Dept: PAIN MEDICINE | Facility: CLINIC | Age: 81
End: 2021-11-04

## 2021-11-05 ENCOUNTER — TELEPHONE (OUTPATIENT)
Dept: PAIN MEDICINE | Facility: CLINIC | Age: 81
End: 2021-11-05

## 2021-11-05 NOTE — TELEPHONE ENCOUNTER
1st attempt  Lm to cb with % improvement and pain level.       S/p LESi to the left #1 10/29, F/u 11/30

## 2021-11-08 ENCOUNTER — TELEPHONE (OUTPATIENT)
Dept: FAMILY MEDICINE CLINIC | Facility: CLINIC | Age: 81
End: 2021-11-08

## 2021-11-08 ENCOUNTER — HOSPITAL ENCOUNTER (OUTPATIENT)
Dept: NON INVASIVE DIAGNOSTICS | Age: 81
Discharge: HOME | End: 2021-11-08
Payer: COMMERCIAL

## 2021-11-08 ENCOUNTER — OFFICE VISIT (OUTPATIENT)
Dept: FAMILY MEDICINE CLINIC | Facility: CLINIC | Age: 81
End: 2021-11-08
Payer: COMMERCIAL

## 2021-11-08 VITALS
HEIGHT: 68 IN | BODY MASS INDEX: 39.1 KG/M2 | OXYGEN SATURATION: 97 % | SYSTOLIC BLOOD PRESSURE: 126 MMHG | TEMPERATURE: 98.8 F | HEART RATE: 71 BPM | WEIGHT: 258 LBS | DIASTOLIC BLOOD PRESSURE: 80 MMHG

## 2021-11-08 DIAGNOSIS — M25.571 ACUTE RIGHT ANKLE PAIN: ICD-10-CM

## 2021-11-08 DIAGNOSIS — M25.571 ACUTE RIGHT ANKLE PAIN: Primary | ICD-10-CM

## 2021-11-08 DIAGNOSIS — I82.461 ACUTE DEEP VEIN THROMBOSIS (DVT) OF CALF MUSCLE VEIN OF RIGHT LOWER EXTREMITY (HCC): ICD-10-CM

## 2021-11-08 DIAGNOSIS — M79.604 PAIN OF RIGHT LOWER EXTREMITY: ICD-10-CM

## 2021-11-08 PROCEDURE — 99214 OFFICE O/P EST MOD 30 MIN: CPT | Performed by: FAMILY MEDICINE

## 2021-11-08 PROCEDURE — 93971 EXTREMITY STUDY: CPT

## 2021-11-08 PROCEDURE — 93971 EXTREMITY STUDY: CPT | Performed by: SURGERY

## 2021-11-09 DIAGNOSIS — I82.432 ACUTE DEEP VEIN THROMBOSIS (DVT) OF POPLITEAL VEIN OF LEFT LOWER EXTREMITY (HCC): Primary | ICD-10-CM

## 2021-11-11 DIAGNOSIS — I82.432 ACUTE DEEP VEIN THROMBOSIS (DVT) OF POPLITEAL VEIN OF LEFT LOWER EXTREMITY (HCC): ICD-10-CM

## 2021-11-17 ENCOUNTER — OFFICE VISIT (OUTPATIENT)
Dept: FAMILY MEDICINE CLINIC | Facility: HOSPITAL | Age: 81
End: 2021-11-17
Payer: COMMERCIAL

## 2021-11-17 VITALS
SYSTOLIC BLOOD PRESSURE: 140 MMHG | HEART RATE: 70 BPM | TEMPERATURE: 97.5 F | BODY MASS INDEX: 39.62 KG/M2 | DIASTOLIC BLOOD PRESSURE: 80 MMHG | WEIGHT: 261.4 LBS | HEIGHT: 68 IN

## 2021-11-17 DIAGNOSIS — M54.16 LUMBAR RADICULOPATHY: ICD-10-CM

## 2021-11-17 DIAGNOSIS — I82.4Y1 ACUTE DEEP VEIN THROMBOSIS (DVT) OF PROXIMAL VEIN OF RIGHT LOWER EXTREMITY (HCC): Primary | ICD-10-CM

## 2021-11-17 DIAGNOSIS — I10 ESSENTIAL HYPERTENSION: ICD-10-CM

## 2021-11-17 PROBLEM — I82.4Y9 ACUTE DEEP VEIN THROMBOSIS (DVT) OF PROXIMAL VEIN OF LOWER EXTREMITY (HCC): Status: ACTIVE | Noted: 2021-11-17

## 2021-11-17 PROCEDURE — 3079F DIAST BP 80-89 MM HG: CPT | Performed by: FAMILY MEDICINE

## 2021-11-17 PROCEDURE — 99214 OFFICE O/P EST MOD 30 MIN: CPT | Performed by: FAMILY MEDICINE

## 2021-11-17 PROCEDURE — 3077F SYST BP >= 140 MM HG: CPT | Performed by: FAMILY MEDICINE

## 2021-11-17 PROCEDURE — 1160F RVW MEDS BY RX/DR IN RCRD: CPT | Performed by: FAMILY MEDICINE

## 2021-11-17 PROCEDURE — 1036F TOBACCO NON-USER: CPT | Performed by: FAMILY MEDICINE

## 2021-11-19 ENCOUNTER — TELEPHONE (OUTPATIENT)
Dept: FAMILY MEDICINE CLINIC | Facility: HOSPITAL | Age: 81
End: 2021-11-19

## 2021-11-22 ENCOUNTER — TELEPHONE (OUTPATIENT)
Dept: FAMILY MEDICINE CLINIC | Facility: HOSPITAL | Age: 81
End: 2021-11-22

## 2021-11-30 ENCOUNTER — OFFICE VISIT (OUTPATIENT)
Dept: PAIN MEDICINE | Facility: CLINIC | Age: 81
End: 2021-11-30
Payer: COMMERCIAL

## 2021-11-30 VITALS
HEART RATE: 60 BPM | HEIGHT: 68 IN | DIASTOLIC BLOOD PRESSURE: 78 MMHG | BODY MASS INDEX: 38.95 KG/M2 | WEIGHT: 257 LBS | SYSTOLIC BLOOD PRESSURE: 128 MMHG | TEMPERATURE: 97.7 F

## 2021-11-30 DIAGNOSIS — M51.26 PROTRUSION OF LUMBAR INTERVERTEBRAL DISC: ICD-10-CM

## 2021-11-30 DIAGNOSIS — M54.16 LUMBAR RADICULOPATHY: ICD-10-CM

## 2021-11-30 DIAGNOSIS — G89.4 CHRONIC PAIN SYNDROME: Primary | ICD-10-CM

## 2021-11-30 DIAGNOSIS — M47.816 LUMBAR SPONDYLOSIS: ICD-10-CM

## 2021-11-30 PROCEDURE — 99214 OFFICE O/P EST MOD 30 MIN: CPT | Performed by: NURSE PRACTITIONER

## 2021-12-06 DIAGNOSIS — I82.4Y1 ACUTE DEEP VEIN THROMBOSIS (DVT) OF PROXIMAL VEIN OF RIGHT LOWER EXTREMITY (HCC): ICD-10-CM

## 2021-12-22 ENCOUNTER — OFFICE VISIT (OUTPATIENT)
Dept: FAMILY MEDICINE CLINIC | Facility: HOSPITAL | Age: 81
End: 2021-12-22
Payer: COMMERCIAL

## 2021-12-22 VITALS
HEIGHT: 68 IN | DIASTOLIC BLOOD PRESSURE: 70 MMHG | SYSTOLIC BLOOD PRESSURE: 138 MMHG | HEART RATE: 68 BPM | TEMPERATURE: 97.3 F | WEIGHT: 259.8 LBS | BODY MASS INDEX: 39.37 KG/M2

## 2021-12-22 DIAGNOSIS — I82.4Y1 ACUTE DEEP VEIN THROMBOSIS (DVT) OF PROXIMAL VEIN OF RIGHT LOWER EXTREMITY (HCC): Primary | ICD-10-CM

## 2021-12-22 DIAGNOSIS — E66.01 OBESITY, MORBID (HCC): ICD-10-CM

## 2021-12-22 DIAGNOSIS — M54.16 LUMBAR RADICULOPATHY: ICD-10-CM

## 2021-12-22 DIAGNOSIS — C61 PROSTATE CANCER (HCC): ICD-10-CM

## 2021-12-22 DIAGNOSIS — I10 ESSENTIAL HYPERTENSION: ICD-10-CM

## 2021-12-22 DIAGNOSIS — M54.50 ACUTE LEFT-SIDED LOW BACK PAIN WITHOUT SCIATICA: ICD-10-CM

## 2021-12-22 DIAGNOSIS — Z23 ENCOUNTER FOR IMMUNIZATION: ICD-10-CM

## 2021-12-22 PROCEDURE — 99214 OFFICE O/P EST MOD 30 MIN: CPT | Performed by: FAMILY MEDICINE

## 2021-12-22 PROCEDURE — 3075F SYST BP GE 130 - 139MM HG: CPT | Performed by: FAMILY MEDICINE

## 2021-12-22 PROCEDURE — G0008 ADMIN INFLUENZA VIRUS VAC: HCPCS | Performed by: FAMILY MEDICINE

## 2021-12-22 PROCEDURE — 3078F DIAST BP <80 MM HG: CPT | Performed by: FAMILY MEDICINE

## 2021-12-22 PROCEDURE — 1160F RVW MEDS BY RX/DR IN RCRD: CPT | Performed by: FAMILY MEDICINE

## 2021-12-22 PROCEDURE — 90662 IIV NO PRSV INCREASED AG IM: CPT | Performed by: FAMILY MEDICINE

## 2021-12-22 PROCEDURE — 1036F TOBACCO NON-USER: CPT | Performed by: FAMILY MEDICINE

## 2021-12-22 RX ORDER — LISINOPRIL 5 MG/1
5 TABLET ORAL DAILY
Qty: 90 TABLET | Refills: 3 | Status: SHIPPED | OUTPATIENT
Start: 2021-12-22

## 2021-12-22 RX ORDER — TIZANIDINE HYDROCHLORIDE 2 MG/1
2 CAPSULE, GELATIN COATED ORAL 3 TIMES DAILY
Qty: 21 CAPSULE | Refills: 0 | Status: SHIPPED | OUTPATIENT
Start: 2021-12-22 | End: 2021-12-27 | Stop reason: SDUPTHER

## 2021-12-27 DIAGNOSIS — M54.50 ACUTE LEFT-SIDED LOW BACK PAIN WITHOUT SCIATICA: ICD-10-CM

## 2021-12-27 RX ORDER — TIZANIDINE HYDROCHLORIDE 2 MG/1
2 CAPSULE, GELATIN COATED ORAL 3 TIMES DAILY
Qty: 21 CAPSULE | Refills: 0 | Status: SHIPPED | OUTPATIENT
Start: 2021-12-27 | End: 2022-06-17

## 2022-02-24 ENCOUNTER — HOSPITAL ENCOUNTER (OUTPATIENT)
Dept: NON INVASIVE DIAGNOSTICS | Age: 82
Discharge: HOME | End: 2022-02-24
Payer: COMMERCIAL

## 2022-02-24 DIAGNOSIS — I82.4Y1 ACUTE DEEP VEIN THROMBOSIS (DVT) OF PROXIMAL VEIN OF RIGHT LOWER EXTREMITY (HCC): ICD-10-CM

## 2022-02-24 PROCEDURE — 93971 EXTREMITY STUDY: CPT | Performed by: SURGERY

## 2022-02-24 PROCEDURE — 93971 EXTREMITY STUDY: CPT

## 2022-03-03 ENCOUNTER — OFFICE VISIT (OUTPATIENT)
Dept: FAMILY MEDICINE CLINIC | Facility: HOSPITAL | Age: 82
End: 2022-03-03
Payer: COMMERCIAL

## 2022-03-03 VITALS
BODY MASS INDEX: 40.95 KG/M2 | DIASTOLIC BLOOD PRESSURE: 84 MMHG | HEIGHT: 68 IN | WEIGHT: 270.2 LBS | TEMPERATURE: 98.1 F | HEART RATE: 61 BPM | SYSTOLIC BLOOD PRESSURE: 142 MMHG

## 2022-03-03 DIAGNOSIS — L57.0 ACTINIC KERATOSIS: ICD-10-CM

## 2022-03-03 DIAGNOSIS — Z12.83 SKIN CANCER SCREENING: ICD-10-CM

## 2022-03-03 DIAGNOSIS — C61 PROSTATE CANCER (HCC): ICD-10-CM

## 2022-03-03 DIAGNOSIS — E66.01 OBESITY, MORBID (HCC): ICD-10-CM

## 2022-03-03 DIAGNOSIS — I82.4Y1 DEEP VEIN THROMBOSIS (DVT) OF PROXIMAL VEIN OF RIGHT LOWER EXTREMITY, UNSPECIFIED CHRONICITY (HCC): Primary | ICD-10-CM

## 2022-03-03 PROCEDURE — 99214 OFFICE O/P EST MOD 30 MIN: CPT | Performed by: FAMILY MEDICINE

## 2022-03-03 PROCEDURE — 3079F DIAST BP 80-89 MM HG: CPT | Performed by: FAMILY MEDICINE

## 2022-03-03 PROCEDURE — 1036F TOBACCO NON-USER: CPT | Performed by: FAMILY MEDICINE

## 2022-03-03 PROCEDURE — 1160F RVW MEDS BY RX/DR IN RCRD: CPT | Performed by: FAMILY MEDICINE

## 2022-03-03 PROCEDURE — 3077F SYST BP >= 140 MM HG: CPT | Performed by: FAMILY MEDICINE

## 2022-03-03 RX ORDER — CURCUMIN 100 %
POWDER (GRAM) MISCELLANEOUS
COMMUNITY

## 2022-03-03 NOTE — PATIENT INSTRUCTIONS
Do not take NSAIDS while on xarelto  Examples of these are Aleve, naproxen, Advil, Motrin, Ibuprofen  As a first line pain medication use tylenol ( acetominophen) 500-650 mg every 6 hours as needed  Rivaroxaban (By mouth)   Rivaroxaban ([id number]-[id number]-a-ban)  Treats and prevents blood clots, which lowers the risk of stroke, deep vein thrombosis (DVT), pulmonary embolism (PE), heart attack, major amputation, sudden decrease in blood flow to the legs, and similar conditions  Also prevents blood clots in people who are hospitalized for an acute illness  This medicine is a blood thinner  Brand Name(s): Xarelto, Xarelto Starter Pack   There may be other brand names for this medicine  When This Medicine Should Not Be Used: This medicine is not right for everyone  Do not use it if you had an allergic reaction to rivaroxaban, or if you have severe bleeding  How to Use This Medicine:   Tablet  · Your doctor will tell you how much medicine to use  Do not use more than directed  Take this medicine at the same time each day  · 2 5 milligram (mg) or 10 mg tablet: Take with or without food  · 15 mg or 20 mg tablet: Take with food  · If you cannot swallow the tablets whole, you may crush the tablet and mix it with a small amount of applesauce  Eat some food right after you swallow the mixture  · Tube feeding: You may crush the tablet and mix the medicine in 50 milliliters (mL) of water before giving it via the tube  This must be followed by a feeding  · This medicine should come with a Medication Guide  Ask your pharmacist for a copy if you do not have one  · Missed dose:   ? Ask your doctor or pharmacist if you are not sure what to do if you miss a dose  ? Once-daily dose: If you miss a dose or forget to use your medicine, use it as soon as you can on the same day  Do not use extra medicine to make up for a missed dose  ? Twice-daily dose to prevent serious heart or blood vessel problems (2 5 mg tablet):  If you miss a dose, skip the missed dose and take your next dose at your usual time  ? Twice-daily dose to treat a blood clot (15 mg tablet): If you miss a dose or forget to use your medicine, use it as soon as you can on the same day  You may take 2 doses at the same time to make up for the missed dose  This is only for people who take a total of 30 mg per day  · Store the medicine in a closed container at room temperature, away from heat, moisture, and direct light  You may store the crushed tablet with applesauce or water mixture for up to 4 hours  Drugs and Foods to Avoid:   Ask your doctor or pharmacist before using any other medicine, including over-the-counter medicines, vitamins, and herbal products  · Some medicines can affect how rivaroxaban works  Tell your doctor if you are using any of the following:  ? Carbamazepine, conivaptan, erythromycin, indinavir, itraconazole, ketoconazole, lopinavir, phenytoin, rifampin, ritonavir, Catherine's wort  ? Another blood thinner (including clopidogrel, enoxaparin, heparin, warfarin)  ? NSAID pain or arthritis medicine (including aspirin, celecoxib, diclofenac, ibuprofen, naproxen)  Warnings While Using This Medicine:   · Tell your doctor if you are pregnant or breastfeeding, or if you have kidney disease, liver disease, bleeding problems, cancer, lung problems, an artificial heart valve, or antiphospholipid syndrome  · This medicine may increase your risk of bleeding  Be careful to avoid injuries that could cause bleeding  Stay away from rough sports or other situations where you could be bruised, cut, or hurt  Brush and floss your teeth gently  Be careful when using sharp objects, including razors and fingernail clippers  Avoid picking your nose  If you need to blow your nose, blow it gently  · This medicine may cause nerve damage if you have a medical procedure done to your back, including anesthesia or a spinal puncture   This is more likely to happen if you have a history of back injury, back surgery, problems with your spine, or procedures or punctures to your back  Tell your doctor if you are also taking another blood thinner, because this also increases the risk  · Do not stop using this medicine suddenly without asking your doctor  You might have an increased risk for stroke for a short time after you stop using this medicine  · Tell any doctor or dentist who treats you that you are using this medicine  · Your doctor will do lab tests at regular visits to check on the effects of this medicine  Keep all appointments  · Keep all medicine out of the reach of children  Never share your medicine with anyone  Possible Side Effects While Using This Medicine:   Call your doctor right away if you notice any of these side effects:  · Allergic reaction: Itching or hives, swelling in your face or hands, swelling or tingling in your mouth or throat, chest tightness, trouble breathing  · Blistering, peeling, red skin rash  · Decrease in how much or how often you urinate  · Heavy menstrual bleeding or vaginal bleeding  · Red or brown urine, bloody or black, tarry stools  · Unusual bleeding or bruising, including frequent nosebleeds  · Vomiting of blood or material that looks like coffee grounds  If you notice other side effects that you think are caused by this medicine, tell your doctor  Call your doctor for medical advice about side effects  You may report side effects to FDA at 0-116-FDA-4068    © Copyright [employer] 2022 Information is for End User's use only and may not be sold, redistributed or otherwise used for commercial purposes  The above information is an  only  It is not intended as medical advice for individual conditions or treatments  Talk to your doctor, nurse or pharmacist before following any medical regimen to see if it is safe and effective for you

## 2022-03-03 NOTE — PROGRESS NOTES
Assessment/Plan:      Problem List Items Addressed This Visit        Cardiovascular and Mediastinum    Deep vein thrombosis (DVT) of proximal lower extremity ([facility] ) - Primary       Genitourinary    Prostate cancer ([facility] [address] )     Stable  Continue Urology fu  Other    Obesity, morbid ([facility] [address] )      Other Visit Diagnoses     Actinic keratosis        Relevant Orders    Ambulatory Referral to Dermatology    Skin cancer screening        Relevant Orders    Ambulatory Referral to Dermatology           Plan/Discussion:  Patient with unprovoked DVT  This is in the setting of Prostate CA  Mother with PE ( no genetic testing done that is known)  Discussed and agreed on life long DVt treatment with anticoagulation  Discussed pros and cons  As he is to continue on anticoagulation a thrombosis panel would not be needed  Advised to continue use of compression stockings  The last VDE did not show new or old DVT  Referral to Derm  Appears to have AK on the back  Should have skin cancer screening as well  Subjective:   Chief Complaint   Patient presents with    Follow-up     review results         Patient ID: Willy Yates is a [de-identified] y o  male  Pt here for fu of dvt  Had an unprovoked dvt  On review mother did have a PE  He does have prostate ca  He is doing well on xarelto  No signs of bleeding  Concerned about skin lesion on his back  The following portions of the patient's history were reviewed and updated as appropriate: allergies, current medications, past family history, past medical history, past social history, past surgical history and problem list     Review of Systems   Constitutional: Negative  Negative for activity change, appetite change, chills and diaphoresis  HENT: Negative for congestion and dental problem  Respiratory: Negative  Negative for apnea, chest tightness, shortness of breath and wheezing  Cardiovascular: Negative  Negative for chest pain, palpitations and leg swelling  Gastrointestinal: Negative  Negative for abdominal distention, abdominal pain, constipation, diarrhea and nausea  Genitourinary: Negative  Negative for difficulty urinating, dysuria and frequency  Objective:  Vitals:    22 1000   BP: 142/84   Pulse: 61   Temp: 98 1 °F (36 7 °C)   Weight: 123 kg (270 lb 3 2 oz)   Height: 5' 8" (1 727 m)     BP Readings from Last 6 Encounters:   22 142/84   21 138/70   21 128/78   21 140/80   21 126/80   10/29/21 145/70      Wt Readings from Last 6 Encounters:   22 123 kg (270 lb 3 2 oz)   21 118 kg (259 lb 12 8 oz)   21 117 kg (257 lb)   21 119 kg (261 lb 6 4 oz)   21 117 kg (258 lb)   10/20/21 119 kg (262 lb)             Physical Exam  Vitals and nursing note reviewed  Constitutional:       Appearance: Normal appearance  HENT:      Head: Normocephalic  Cardiovascular:      Rate and Rhythm: Normal rate and regular rhythm  Heart sounds: Normal heart sounds  Pulmonary:      Effort: Pulmonary effort is normal       Breath sounds: Normal breath sounds  Musculoskeletal:      Right lower le+ Edema present  Neurological:      Mental Status: He is alert

## 2022-03-09 ENCOUNTER — TELEPHONE (OUTPATIENT)
Dept: FAMILY MEDICINE CLINIC | Facility: HOSPITAL | Age: 82
End: 2022-03-09

## 2022-03-09 NOTE — TELEPHONE ENCOUNTER
Pt states he called Dermatology and got an appointment for 5/19/22  Is it ok if he waits that long or is there somewhere else he can try?   Pt can be reached at [phone]

## 2022-05-19 ENCOUNTER — CONSULT (OUTPATIENT)
Dept: DERMATOLOGY | Facility: CLINIC | Age: 82
End: 2022-05-19
Payer: COMMERCIAL

## 2022-05-19 VITALS — WEIGHT: 263 LBS | BODY MASS INDEX: 39.99 KG/M2

## 2022-05-19 DIAGNOSIS — D22.5 MULTIPLE BENIGN MELANOCYTIC NEVI OF UPPER AND LOWER EXTREMITIES AND TRUNK: Primary | ICD-10-CM

## 2022-05-19 DIAGNOSIS — D22.70 MULTIPLE BENIGN MELANOCYTIC NEVI OF UPPER AND LOWER EXTREMITIES AND TRUNK: Primary | ICD-10-CM

## 2022-05-19 DIAGNOSIS — D22.60 MULTIPLE BENIGN MELANOCYTIC NEVI OF UPPER AND LOWER EXTREMITIES AND TRUNK: Primary | ICD-10-CM

## 2022-05-19 DIAGNOSIS — D18.01 CHERRY ANGIOMA: ICD-10-CM

## 2022-05-19 DIAGNOSIS — L82.1 SEBORRHEIC KERATOSIS: ICD-10-CM

## 2022-05-19 PROCEDURE — 1036F TOBACCO NON-USER: CPT | Performed by: STUDENT IN AN ORGANIZED HEALTH CARE EDUCATION/TRAINING PROGRAM

## 2022-05-19 PROCEDURE — 99203 OFFICE O/P NEW LOW 30 MIN: CPT | Performed by: STUDENT IN AN ORGANIZED HEALTH CARE EDUCATION/TRAINING PROGRAM

## 2022-05-19 NOTE — PROGRESS NOTES
[address] [facility] Note     Patient Name: Nikhil Feliciano  Encounter Date: 05/19/22       Have you been cared for by a [facility] Dermatologist in the last 3 years and, if so, which one? No    · Have you traveled outside of the [address] [place] [address] in the past 3 months or outside of the [facility] [place] in [place] last 2 weeks? No     May we call your Preferred Phone number to discuss your specific medical information? Yes     May we leave a detailed message that includes your specific medical information? Yes      Today's Chief Concerns:   Concern #1:  Spot of concern on left back     Concern #2:  Past Medical History:  Have you personally ever had or currently have any of the following? · Skin cancer (such as Melanoma, Basal Cell Carcinoma, Squamous Cell Carcinoma? (If Yes, please provide more detail)- No  · Eczema: No  · Psoriasis: No  · HIV/AIDS: No  · Hepatitis B or C: No  · Tuberculosis: No  · Systemic Immunosuppression such as Diabetes, Biologic or Immunotherapy, Chemotherapy, Organ Transplantation, Bone Marrow Transplantation (If YES, please provide more detail): No  · Radiation Treatment (If YES, please provide more detail): No  · Any other major medical conditions/concerns? (If Yes, which types)- YES, Prostate cancer (2000); HTN     Social History:     What is/was your primary occupation? Retired     What are your hobbies/past-times? Family History:  Have any of your "first degree relatives" (parent, brother, sister, or child) had any of the following       · Skin cancer such as Melanoma or Merkel Cell Carcinoma or Pancreatic Cancer? No  · Eczema, Asthma, Hay Fever or Seasonal Allergies: No  · Psoriasis or Psoriatic Arthritis: No  · Do any other medical conditions seem to run in your family? If Yes, what condition and which relatives?   No    Current Medications:         Current Outpatient Medications:     lisinopril (ZESTRIL) 5 mg tablet, Take 1 tablet (5 mg total) by mouth daily, Disp: 90 tablet, Rfl: 3    rivaroxaban (Xarelto) 20 mg tablet, Take 1 tablet (20 mg total) by mouth daily, Disp: 90 tablet, Rfl: 0    TiZANidine (Zanaflex) 2 MG capsule, Take 1 capsule (2 mg total) by mouth 3 (three) times a day for 7 days, Disp: 21 capsule, Rfl: 0    TURMERIC PO, Take by mouth, Disp: , Rfl:     Turmeric, Curcuma Longa, (Curcumin) POWD, Use, Disp: , Rfl:       Review of Systems:  Have you recently had or currently have any of the following? If YES, what are you doing for the problem? · Fever, chills or unintended weight loss: No  · Sudden loss or change in your vision: No  · Nausea, vomiting or blood in your stool: No  · Painful or swollen joints: No  · Wheezing or cough: No  · Changing mole or non-healing wound: No  · Nosebleeds: No  · Excessive sweating: No  · Easy or prolonged bleeding? YES, Xarelto   · Over the last 2 weeks, how often have you been bothered by the following problems? · Taking little interest or pleasure in doing things: 1 - Not at All  · Feeling down, depressed, or hopeless: 1 - Not at All  · Rapid heartbeat with epinephrine:  No    · FEMALES ONLY:    · Are you pregnant or planning to become pregnant? No  · Are you currently or planning to be nursing or breast feeding? No    · Any known allergies? · No Known Allergies      Physical Exam:     Was a chaperone (Derm Clinical Assistant) present throughout the entire Physical Exam? Yes     Did the Dermatology Team specifically  the patient on the importance of a Full Skin Exam to be sure that nothing is missed clinically? Yes}  o Did the patient ultimately request or accept a Full Skin Exam?  NO; spot of concern       CONSTITUTIONAL:   There were no vitals filed for this visit    PSYCH: Normal mood and affect  EYES: Normal conjunctiva  ENT: Normal lips and oral mucosa  CARDIOVASCULAR: No edema  RESPIRATORY: Normal respirations  HEME/LYMPH/IMMUNO:  No regional lymphadenopathy except as noted below in "ASSESSMENT AND PLAN BY DIAGNOSIS"    SKIN:  FULL ORGAN SYSTEM EXAM  Hair, Scalp, Ears, Face Normal except as noted below in Assessment   Neck, Cervical Chain Nodes Normal except as noted below in Assessment   Right Arm/Hand/Fingers Normal except as noted below in Assessment   Left Arm/Hand/Fingers Normal except as noted below in Assessment   Chest/Breasts/Axillae Viewed areas Normal except as noted below in Assessment   Abdomen, Umbilicus Normal except as noted below in Assessment   Back/Spine/Right leg/Left leg Normal except as noted below in Assessment   Groin/Genitalia/Buttocks NOT EXAMINED (PT DEFERRED)   Right Foot, Toes NOT EXAMINED (PT DEFERRED)   Left Foot, Toes NOT EXAMINED (PT DEFERRED)        Assessment and Plan by Diagnosis:    History of Present Condition:     Duration:  How long has this been an issue for you?    o  Months   Location Affected:  Where on the body is this affecting you?    o  left back    Quality:  Is there any bleeding, pain, itch, burning/irritation, or redness associated with the skin lesion? o  itching    Severity:  Describe any bleeding, pain, itch, burning/irritation, or redness on a scale of 1 to 10 (with 10 being the worst)  o     Timing:  Does this condition seem to be there pretty constantly or do you notice it more at specific times throughout the day?     o  constant    Context:  Have you ever noticed that this condition seems to be associated with specific activities you do?    o  unknown   Modifying Factors:    o Anything that seems to make the condition worse?    -  avoids scratching area  o What have you tried to do to make the condition better?    -  denies   Associated Signs and Symptoms:  Does this skin lesion seem to be associated with any of the following:  o  SL AMB DERM SIGNS AND SYMPTOMS: Itching and Scratching     MELANOCYTIC NEVI ("Moles")    Physical Exam:   Anatomic Location Affected:    Upper extremities   Morphological Description:  Scattered, 1-4mm round to ovoid, symmetrical-appearing, even bordered, skin colored to dark brown macules/papules, mostly in sun-exposed areas   Pertinent Positives:   Pertinent Negatives: Additional History of Present Condition:  Discovered upon skin exam; patient only wanted spot check  Assessment and Plan:  Based on a thorough discussion of this condition and the management approach to it (including a comprehensive discussion of the known risks, side effects and potential benefits of treatment), the patient (family) agrees to implement the following specific plan:   Monitor for changes   When outside we recommend using a wide brim hat, sunglasses, long sleeve and pants, sunscreen with SPF 58+ with reapplication every 2 hours, or SPF specific clothing    Recommend scheduling a full skin exam in the future      Melanocytic Nevi  Melanocytic nevi ("moles") are tan or brown, raised or flat areas of the skin which have an increased number of melanocytes  Melanocytes are the cells in our body which make pigment and account for skin color  Some moles are present at birth (I e , "congenital nevi"), while others come up later in life (i e , "acquired nevi")  The sun can stimulate the body to make more moles  Sunburns are not the only thing that triggers more moles  Chronic sun exposure can do it too  Clinically distinguishing a healthy mole from melanoma may be difficult, even for experienced dermatologists  The "ABCDE's" of moles have been suggested as a means of helping to alert a person to a suspicious mole and the possible increased risk of melanoma  The suggestions for raising alert are as follows:    Asymmetry: Healthy moles tend to be symmetric, while melanomas are often asymmetric  Asymmetry means if you draw a line through the mole, the two halves do not match in color, size, shape, or surface texture   Asymmetry can be a result of rapid enlargement of a mole, the development of a raised area on a previously flat lesion, scaling, ulceration, bleeding or scabbing within the mole  Any mole that starts to demonstrate "asymmetry" should be examined promptly by a board certified dermatologist      Border: Healthy moles tend to have discrete, even borders  The border of a melanoma often blends into the normal skin and does not sharply delineate the mole from normal skin  Any mole that starts to demonstrate "uneven borders" should be examined promptly by a board certified dermatologist      Color: Healthy moles tend to be one color throughout  Melanomas tend to be made up of different colors ranging from dark black, blue, white, or red  Any mole that demonstrates a color change should be examined promptly by a board certified dermatologist      Diameter: Healthy moles tend to be smaller than 0 6 cm in size; an exception are "congenital nevi" that can be larger  Melanomas tend to grow and can often be greater than 0 6 cm (1/4 of an inch, or the size of a pencil eraser)  This is only a guideline, and many normal moles may be larger than 0 6 cm without being unhealthy  Any mole that starts to change in size (small to bigger or bigger to smaller) should be examined promptly by a board certified dermatologist      Evolving: Healthy moles tend to "stay the same "  Melanomas may often show signs of change or evolution such as a change in size, shape, color, or elevation  Any mole that starts to itch, bleed, crust, burn, hurt, or ulcerate or demonstrate a change or evolution should be examined promptly by a board certified dermatologist       Dysplastic Nevi  Dysplastic moles are moles that fit the ABCDE rules of melanoma but are not identified as melanomas when examined under the microscope  They may indicate an increased risk of melanoma in that person  If there is a family history of melanoma, most experts agree that the person may be at an increased risk for developing a melanoma    Experts still do not agree on what dysplastic moles mean in patients without a personal or family history of melanoma  Dysplastic moles are usually larger than common moles and have different colors within it with irregular borders  The appearance can be very similar to a melanoma  Biopsies of dysplastic moles may show abnormalities which are different from a regular mole  Melanoma  Malignant melanoma is a type of skin cancer that can be deadly if it spreads throughout the body  The incidence of melanoma in the [place] is growing faster than any other cancer  Melanoma usually grows near the surface of the skin for a period of time, and then begins to grow deeper into the skin  Once it grows deeper into the skin, the risk of spread to other organs greatly increases  Therefore, early detection and removal of a malignant melanoma may result in a better chance at a complete cure; removal after the tumor has spread may not be as effective, leading to worse clinical outcomes such as death  The true rate of nevus transformation into a melanoma is unknown  It has been estimated that the lifetime risk for any acquired melanocytic nevus on any 21year-old individual transforming into melanoma by age [de-identified] is 0 03% (1 in 3,164) for men and 0 009% (1 in 10,800) for women  The appearance of a "new mole" remains one of the most reliable methods for identifying a malignant melanoma  Occasionally, melanomas appear as rapidly growing, blue-black, dome-shaped bumps within a previous mole or previous area of normal skin  Other times, melanomas are suspected when a mole suddenly appears or changes  Itching, burning, or pain in a pigmented lesion should increase suspicion, but most patients with early melanoma have no skin discomfort whatsoever  Melanoma can occur anywhere on the skin, including areas that are difficult for self-examination  Many melanomas are first noticed by other family members    Suspicious-looking moles may be removed for microscopic examination  You may be able to prevent death from melanoma by doing two simple things:    1  Try to avoid unnecessary sun exposure and protect your skin when it is exposed to the sun  People who live near the equator, people who have intermittent exposures to large amounts of sun, and people who have had sunburns in childhood or adolescence have an increased risk for melanoma  Sun sense and vigilant sun protection may be keys to helping to prevent melanoma  We recommend wearing UPF-rated sun protective clothing and sunglasses whenever possible and applying a moisturizer-sunscreen combination product (SPF 50+) such as Neutrogena Daily Defense to sun exposed areas of skin at least three times a day  2  Have your moles regularly examined by a board certified dermatologist AND by yourself or a family member/friend at home  We recommend that you have your moles examined at least once a year by a board certified dermatologist   Use your birthday as an annual reminder to have your "Birthday Suit" (I e , your skin) examined; it is a nice birthday gift to yourself to know that your skin is healthy appearing! Additionally, at-home self examinations may be helpful for detecting a possible melanoma  Use the ABCDEs we discussed and check your moles once a month at home  CHERRY ANGIOMAS    Physical Exam:   Anatomic Location Affected:  Upper extremities and trunk    Morphological Description:  Scattered cherry red, 1-4 mm papules   Pertinent Positives:   Pertinent Negatives: Additional History of Present Condition:  Discovered upon skin exam; patient only wanted spot check       Assessment and Plan:  Based on a thorough discussion of this condition and the management approach to it (including a comprehensive discussion of the known risks, side effects and potential benefits of treatment), the patient (family) agrees to implement the following specific plan:   Reassured benign    Assessment and Plan:    Cherry angioma, also known as [employer] spots, are benign vascular skin lesions  A "cherry angioma" is a firm red, blue or purple papule, 0 1-1 cm in diameter  When thrombosed, they can appear black in colour until evaluated with a dermatoscope when the red or purple colour is more easily seen  Cherry angioma may develop on any part of the body but most often appear on the scalp, face, lips and trunk  An angioma is due to proliferating endothelial cells; these are the cells that line the inside of a blood vessel  Angiomas can arise in early life or later in life; the most common type of angioma is a cherry angioma  Cherry angiomas are very common in males and females of any age or race  They are more noticeable in white skin than in skin of colour  They markedly increase in number from about the age of 36  There may be a family history of similar lesions  Eruptive cherry angiomas have been rarely reported to be associated with internal malignancy  The cause of angiomas is unknown  Genetic analysis of cherry angiomas has shown that they frequently carry specific somatic missense mutations in the GNAQ and GNA11 (Q209H) genes, which are involved in other vascular and melanocytic proliferations  Cherry angioma is usually diagnosed clinically and no investigations are necessary for the majority of lesions  It has a characteristic red-clod or lobular pattern on dermatoscopy (called lacunar pattern using conventional pattern analysis)  When there is uncertainty about the diagnosis, a biopsy may be performed  The angioma is composed of venules in a thickened papillary dermis  Collagen bundles may be prominent between the lobules  Cherry angiomas are harmless, so they do not usually have to be treated   Occasionally, they are removed to exclude a malignant skin lesion such as a nodular melanoma or because they are irritated or bleeding (and a subsequent risk for infection)  To decrease friction over the lesions, we recommend Neutrogena Daily Defense SPF 50+ at least 3 times a day  SEBORRHEIC KERATOSIS; NON-INFLAMED    Physical Exam:   Anatomic Location Affected:  Upper extremities and trunk   Morphological Description:  Flat and raised, waxy, smooth to warty textured, yellow to brownish-grey to dark brown to blackish, discrete, "stuck-on" appearing papules   Pertinent Positives:   Pertinent Negatives: Additional History of Present Condition:  Discovered upon skin exam; patient only wanted spot check  Assessment and Plan:  Based on a thorough discussion of this condition and the management approach to it (including a comprehensive discussion of the known risks, side effects and potential benefits of treatment), the patient (family) agrees to implement the following specific plan:   Reassured benign     Seborrheic Keratosis  A seborrheic keratosis is a harmless warty spot that appears during adult life as a common sign of skin aging  Seborrheic keratoses can arise on any area of skin, covered or uncovered, with the usual exception of the palms and soles  They do not arise from mucous membranes  Seborrheic keratoses can have highly variable appearance  Seborrheic keratoses are extremely common  It has been estimated that over 90% of adults over the age of 61 years have one or more of them  They occur in males and females of all races, typically beginning to erupt in the 35s or 45s  They are uncommon under the age of 21 years  The precise cause of seborrhoeic keratoses is not known  Seborrhoeic keratoses are considered degenerative in nature  As time goes by, seborrheic keratoses tend to become more numerous  Some people inherit a tendency to develop a very large number of them; some people may have hundreds of them      The name "seborrheic keratosis" is misleading, because these lesions are not limited to a seborrhoeic distribution (scalp, mid-face, chest, upper back), nor are they formed from sebaceous glands, nor are they associated with sebum -- which is greasy  Seborrheic keratosis may also be called "SK," "Seb K," "basal cell papilloma," "senile wart," or "barnacle "      Researchers have noted:   Eruptive seborrhoeic keratoses can follow sunburn or dermatitis   Skin friction may be the reason they appear in body folds   Viral cause (e g , human papillomavirus) seems unlikely   Stable and clonal mutations or activation of FRFR3, PIK3CA, MONIKA, AKT1 and EGFR genes are found in seborrhoeic keratoses   Seborrhoeic keratosis can arise from solar lentigo   FRFR3 mutations also arise in solar lentigines  These mutations are associated with increased age and location on the head and neck, suggesting a role of ultraviolet radiation in these lesions   Seborrheic keratoses do not harbour tumour suppressor gene mutations   Epidermal growth factor receptor inhibitors, which are used to treat some cancers, often result in an increase in verrucal (warty) keratoses  There is no easy way to remove multiple lesions on a single occasion  Unless a specific lesion is "inflamed" and is causing pain or stinging/burning or is bleeding, most insurance companies do not authorize treatment  XEROSIS ("DRY SKIN")    Physical Exam:   Anatomic Location Affected:  Upper extremities and trunk   Morphological Description:  Normal dry skin   Pertinent Positives:   Pertinent Negatives: Additional History of Present Condition:  Discovered upon skin exam; patient only wanted spot check  Assessment and Plan:  Based on a thorough discussion of this condition and the management approach to it (including a comprehensive discussion of the known risks, side effects and potential benefits of treatment), the patient (family) agrees to implement the following specific plan: Advise gentle skin care as follows:    Shower with lukewarm water less than 10 minutes   Use Dove unscented soap to groin and armpits and neck        Pat dry after shower  Do not harshly rub  Immediately moisturize with heavy emollient   BEST - OINTMENTS, such as Vaseline, Aquaphor, Cerave healing ointment     BETTER - CREAMS, such as Cerave, Cetaphil, VaniCREAM, Aveeno, Eucerin  AVOID LOTIONS, too thin, most things in pump         Moisturize twice a day  Dry skin refers to skin that feels dry to touch  Dry skin has a dull surface with a rough, scaly quality  The skin is less pliable and cracked  When dryness is severe, the skin may become inflamed and fissured  Although any body site can be dry, dry skin tends to affect the shins more than any other site  Dry skin is lacking moisture in the outer horny cell layer (stratum corneum) and this results in cracks in the skin surface  Dry skin is also called xerosis, xeroderma or asteatosis (lack of fat)  It can affect males and females of all ages  There is some racial variability in water and lipid content of the skin   Dry skin that starts in early childhood may be one of about 20 types of ichthyosis (fish-scale skin)  There is often a family history of dry skin   Dry skin is commonly seen in people with atopic dermatitis   Nearly everyone > 60 years has dry skin  Dry skin that begins later may be seen in people with certain diseases and conditions   Postmenopausal women   Hypothyroidism   Chronic renal disease    Malnutrition and weight loss    Subclinical dermatitis    Treatment with certain drugs such as oral retinoids, diuretics and epidermal growth factor receptor inhibitors    People exposed to a dry environment may experience dry skin     Low humidity: in desert climates or cool, windy conditions    Excessive air conditioning    Direct heat from a fire or fan heater    Excessive bathing    Contact with soap, detergents and solvents    Inappropriate topical agents such as alcohol    Frictional irritation from rough clothing or abrasives    Dry skin is due to abnormalities in the integrity of the barrier function of the stratum corneum, which is made up of corneocytes   There is an overall reduction in the lipids in the stratum corneum   Ratio of ceramides, cholesterol and free fatty acids may be normal or altered   There may be a reduction in the proliferation of keratinocytes   Keratinocyte subtypes change in dry skin with a decrease in keratins K1, K10 and increase in K5, K14     Involucrin (a protein) may be expressed early, increasing cell stiffness   The result is retention of corneocytes and reduced water-holding capacity  The inherited forms of ichthyosis are due to loss of function mutations in various genes (listed in parentheses below)  The clinical features of ichthyosis depend on the specific type of ichthyosis:   Ichthyosis vulgaris (FLG)   Recessive X-linked ichthyosis (STS)    Autosomal recessive congenital ichthyosis (ABCA12, TGM1, ALOXE3)    Keratinopathic ichthyoses (KRT1, KRT10, KRT2)    Acquired ichthyosis may be due to:   Metabolic factors: thyroid deficiency    Illness: lymphoma, internal malignancy, sarcoidosis, HIV infection    Drugs: nicotinic acid, kava, protein kinase inhibitors (eg EGFR inhibitors), hydroxyurea  Complications of dry skin:  Dry areas of skin may become itchy, indicating a form of eczema/dermatitis has developed   Atopic eczema -- especially in people with ichthyosis vulgaris    Eczema craquelé -- especially in elderly people  Also called asteatotic eczema    A dry form of nummular dermatitis/discoid eczema -- especially in people that wash their skin excessively  When the dry skin of an elderly person is itchy without a visible rash, it is sometimes called winter itch, 7th age itch, senile pruritus or chronic pruritus of the elderly      Other complications of dry skin may include:   Skin infection when bacteria or viruses penetrate a break in the skin surface    Overheating, especially in some forms of ichthyosis    Food allergy, eg, to peanuts, has been associated with filaggrin mutations    Contact allergy, eg, to nickel, has also been correlated with barrier function defects  How is the type of dry skin diagnosed? The type of dry skin is diagnosed by careful history and examination  In children:   Family history    Age of onset    Appearance at birth, if known    Distribution of dry skin    Other features, eg eczema, abnormal nails, hair, dentition, sight, hearing  In adults:   Medical history    Medications and topical preparations    Bathing frequency and use of soap    Evaluation of environmental factors that may contribute to dry skin  What is the treatment for dry skin? The mainstay of treatment of dry skin and ichthyosis is moisturisers/emollients  They should be applied liberally and often enough to:   Reduce itch    Improve the barrier function    Prevent entry of irritants, bacteria    Reduce transepidermal water loss  When considering which emollient is most suitable, consider:   Severity of the dryness    Tolerance    Personal preference    Cost and availability  Emollients generally work best if applied to damp skin, if pH is below 7 (acidic), and if containing humectants such as urea or propylene glycol  Additional treatments include:   Topical steroid if itchy or there is dermatitis -- choose an emollient base    Topical calcineurin inhibitors if topical steroids are unsuitable  How can dry skin be prevented? Eliminate aggravating factors:   Reduce the frequency of bathing   A humidifier in winter and air conditioner in summer    Compare having a short shower with a prolonged soak in a bath   Use lukewarm, not hot, water      Replace standard soap with a substitute such as a synthetic detergent cleanser, water-miscible emollient, bath oil, anti-pruritic tar oil, colloidal oatmeal etc   Apply an emollient liberally and often, particularly shortly after bathing, and when itchy  The drier the skin, the thicker this should be, especially on the hands  What is the outlook for dry skin? A tendency to dry skin may persist life-long, or it may improve once contributing factors are controlled      Scribe Attestation    I,:  Eda Lantigua MA am acting as a scribe while in the presence of the attending physician :       I,:  Isatu Britt MD personally performed the services described in this documentation    as scribed in my presence :

## 2022-05-19 NOTE — PATIENT INSTRUCTIONS
Assessment and Plan:  Based on a thorough discussion of this condition and the management approach to it (including a comprehensive discussion of the known risks, side effects and potential benefits of treatment), the patient (family) agrees to implement the following specific plan:  Monitor for changes  When outside we recommend using a wide brim hat, sunglasses, long sleeve and pants, sunscreen with SPF 82+ with reapplication every 2 hours, or SPF specific clothing   Recommend scheduling a full skin exam in the future      Melanocytic Nevi  Melanocytic nevi ("moles") are tan or brown, raised or flat areas of the skin which have an increased number of melanocytes  Melanocytes are the cells in our body which make pigment and account for skin color  Some moles are present at birth (I e , "congenital nevi"), while others come up later in life (i e , "acquired nevi")  The sun can stimulate the body to make more moles  Sunburns are not the only thing that triggers more moles  Chronic sun exposure can do it too  Clinically distinguishing a healthy mole from melanoma may be difficult, even for experienced dermatologists  The "ABCDE's" of moles have been suggested as a means of helping to alert a person to a suspicious mole and the possible increased risk of melanoma  The suggestions for raising alert are as follows:    Asymmetry: Healthy moles tend to be symmetric, while melanomas are often asymmetric  Asymmetry means if you draw a line through the mole, the two halves do not match in color, size, shape, or surface texture  Asymmetry can be a result of rapid enlargement of a mole, the development of a raised area on a previously flat lesion, scaling, ulceration, bleeding or scabbing within the mole  Any mole that starts to demonstrate "asymmetry" should be examined promptly by a board certified dermatologist      Border: Healthy moles tend to have discrete, even borders    The border of a melanoma often blends into the normal skin and does not sharply delineate the mole from normal skin  Any mole that starts to demonstrate "uneven borders" should be examined promptly by a board certified dermatologist      Color: Healthy moles tend to be one color throughout  Melanomas tend to be made up of different colors ranging from dark black, blue, white, or red  Any mole that demonstrates a color change should be examined promptly by a board certified dermatologist      Diameter: Healthy moles tend to be smaller than 0 6 cm in size; an exception are "congenital nevi" that can be larger  Melanomas tend to grow and can often be greater than 0 6 cm (1/4 of an inch, or the size of a pencil eraser)  This is only a guideline, and many normal moles may be larger than 0 6 cm without being unhealthy  Any mole that starts to change in size (small to bigger or bigger to smaller) should be examined promptly by a board certified dermatologist      Evolving: Healthy moles tend to "stay the same "  Melanomas may often show signs of change or evolution such as a change in size, shape, color, or elevation  Any mole that starts to itch, bleed, crust, burn, hurt, or ulcerate or demonstrate a change or evolution should be examined promptly by a board certified dermatologist       Dysplastic Nevi  Dysplastic moles are moles that fit the ABCDE rules of melanoma but are not identified as melanomas when examined under the microscope  They may indicate an increased risk of melanoma in that person  If there is a family history of melanoma, most experts agree that the person may be at an increased risk for developing a melanoma  Experts still do not agree on what dysplastic moles mean in patients without a personal or family history of melanoma  Dysplastic moles are usually larger than common moles and have different colors within it with irregular borders  The appearance can be very similar to a melanoma   Biopsies of dysplastic moles may show abnormalities which are different from a regular mole  Melanoma  Malignant melanoma is a type of skin cancer that can be deadly if it spreads throughout the body  The incidence of melanoma in the [place] is growing faster than any other cancer  Melanoma usually grows near the surface of the skin for a period of time, and then begins to grow deeper into the skin  Once it grows deeper into the skin, the risk of spread to other organs greatly increases  Therefore, early detection and removal of a malignant melanoma may result in a better chance at a complete cure; removal after the tumor has spread may not be as effective, leading to worse clinical outcomes such as death  The true rate of nevus transformation into a melanoma is unknown  It has been estimated that the lifetime risk for any acquired melanocytic nevus on any 21year-old individual transforming into melanoma by age [de-identified] is 0 03% (1 in 3,164) for men and 0 009% (1 in 10,800) for women  The appearance of a "new mole" remains one of the most reliable methods for identifying a malignant melanoma  Occasionally, melanomas appear as rapidly growing, blue-black, dome-shaped bumps within a previous mole or previous area of normal skin  Other times, melanomas are suspected when a mole suddenly appears or changes  Itching, burning, or pain in a pigmented lesion should increase suspicion, but most patients with early melanoma have no skin discomfort whatsoever  Melanoma can occur anywhere on the skin, including areas that are difficult for self-examination  Many melanomas are first noticed by other family members  Suspicious-looking moles may be removed for microscopic examination  You may be able to prevent death from melanoma by doing two simple things:    Try to avoid unnecessary sun exposure and protect your skin when it is exposed to the sun    People who live near the equator, people who have intermittent exposures to large amounts of sun, and people who have had sunburns in childhood or adolescence have an increased risk for melanoma  Sun sense and vigilant sun protection may be keys to helping to prevent melanoma  We recommend wearing UPF-rated sun protective clothing and sunglasses whenever possible and applying a moisturizer-sunscreen combination product (SPF 50+) such as Neutrogena Daily Defense to sun exposed areas of skin at least three times a day  Have your moles regularly examined by a board certified dermatologist AND by yourself or a family member/friend at home  We recommend that you have your moles examined at least once a year by a board certified dermatologist   Use your birthday as an annual reminder to have your "Birthday Suit" (I e , your skin) examined; it is a nice birthday gift to yourself to know that your skin is healthy appearing! Additionally, at-home self examinations may be helpful for detecting a possible melanoma  Use the ABCDEs we discussed and check your moles once a month at home  Assessment and Plan:  Based on a thorough discussion of this condition and the management approach to it (including a comprehensive discussion of the known risks, side effects and potential benefits of treatment), the patient (family) agrees to implement the following specific plan:  Reassured benign    Assessment and Plan:    Cherry angioma, also known as [employer] spots, are benign vascular skin lesions  A "cherry angioma" is a firm red, blue or purple papule, 0 1-1 cm in diameter  When thrombosed, they can appear black in colour until evaluated with a dermatoscope when the red or purple colour is more easily seen  Cherry angioma may develop on any part of the body but most often appear on the scalp, face, lips and trunk  An angioma is due to proliferating endothelial cells; these are the cells that line the inside of a blood vessel      Angiomas can arise in early life or later in life; the most common type of angioma is a cherry angioma  Cherry angiomas are very common in males and females of any age or race  They are more noticeable in white skin than in skin of colour  They markedly increase in number from about the age of 36  There may be a family history of similar lesions  Eruptive cherry angiomas have been rarely reported to be associated with internal malignancy  The cause of angiomas is unknown  Genetic analysis of cherry angiomas has shown that they frequently carry specific somatic missense mutations in the GNAQ and GNA11 (Q209H) genes, which are involved in other vascular and melanocytic proliferations  Cherry angioma is usually diagnosed clinically and no investigations are necessary for the majority of lesions  It has a characteristic red-clod or lobular pattern on dermatoscopy (called lacunar pattern using conventional pattern analysis)  When there is uncertainty about the diagnosis, a biopsy may be performed  The angioma is composed of venules in a thickened papillary dermis  Collagen bundles may be prominent between the lobules  Cherry angiomas are harmless, so they do not usually have to be treated  Occasionally, they are removed to exclude a malignant skin lesion such as a nodular melanoma or because they are irritated or bleeding (and a subsequent risk for infection)  To decrease friction over the lesions, we recommend Neutrogena Daily Defense SPF 50+ at least 3 times a day  Assessment and Plan:  Based on a thorough discussion of this condition and the management approach to it (including a comprehensive discussion of the known risks, side effects and potential benefits of treatment), the patient (family) agrees to implement the following specific plan:  Reassured benign     Seborrheic Keratosis  A seborrheic keratosis is a harmless warty spot that appears during adult life as a common sign of skin aging    Seborrheic keratoses can arise on any area of skin, covered or uncovered, with the usual exception of the palms and soles  They do not arise from mucous membranes  Seborrheic keratoses can have highly variable appearance  Seborrheic keratoses are extremely common  It has been estimated that over 90% of adults over the age of 61 years have one or more of them  They occur in males and females of all races, typically beginning to erupt in the 35s or 45s  They are uncommon under the age of 21 years  The precise cause of seborrhoeic keratoses is not known  Seborrhoeic keratoses are considered degenerative in nature  As time goes by, seborrheic keratoses tend to become more numerous  Some people inherit a tendency to develop a very large number of them; some people may have hundreds of them  The name "seborrheic keratosis" is misleading, because these lesions are not limited to a seborrhoeic distribution (scalp, mid-face, chest, upper back), nor are they formed from sebaceous glands, nor are they associated with sebum -- which is greasy  Seborrheic keratosis may also be called "SK," "Seb K," "basal cell papilloma," "senile wart," or "barnacle "      Researchers have noted:  Eruptive seborrhoeic keratoses can follow sunburn or dermatitis  Skin friction may be the reason they appear in body folds  Viral cause (e g , human papillomavirus) seems unlikely  Stable and clonal mutations or activation of FRFR3, PIK3CA, MONIKA, AKT1 and EGFR genes are found in seborrhoeic keratoses  Seborrhoeic keratosis can arise from solar lentigo  FRFR3 mutations also arise in solar lentigines  These mutations are associated with increased age and location on the head and neck, suggesting a role of ultraviolet radiation in these lesions  Seborrheic keratoses do not harbour tumour suppressor gene mutations  Epidermal growth factor receptor inhibitors, which are used to treat some cancers, often result in an increase in verrucal (warty) keratoses  There is no easy way to remove multiple lesions on a single occasion  Unless a specific lesion is "inflamed" and is causing pain or stinging/burning or is bleeding, most insurance companies do not authorize treatment  Assessment and Plan:  Based on a thorough discussion of this condition and the management approach to it (including a comprehensive discussion of the known risks, side effects and potential benefits of treatment), the patient (family) agrees to implement the following specific plan: Advise gentle skin care as follows: Shower with lukewarm water less than 10 minutes   Use Dove unscented soap to groin and armpits and neck        Pat dry after shower  Do not harshly rub  Immediately moisturize with heavy emollient   BEST - OINTMENTS, such as Vaseline, Aquaphor, Cerave healing ointment     BETTER - CREAMS, such as Cerave, Cetaphil, VaniCREAM, Aveeno, Eucerin  AVOID LOTIONS, too thin, most things in pump         Moisturize twice a day  Dry skin refers to skin that feels dry to touch  Dry skin has a dull surface with a rough, scaly quality  The skin is less pliable and cracked  When dryness is severe, the skin may become inflamed and fissured  Although any body site can be dry, dry skin tends to affect the shins more than any other site  Dry skin is lacking moisture in the outer horny cell layer (stratum corneum) and this results in cracks in the skin surface  Dry skin is also called xerosis, xeroderma or asteatosis (lack of fat)  It can affect males and females of all ages  There is some racial variability in water and lipid content of the skin  Dry skin that starts in early childhood may be one of about 20 types of ichthyosis (fish-scale skin)  There is often a family history of dry skin  Dry skin is commonly seen in people with atopic dermatitis  Nearly everyone > 60 years has dry skin  Dry skin that begins later may be seen in people with certain diseases and conditions    Postmenopausal women  Hypothyroidism  Chronic renal disease   Malnutrition and weight loss   Subclinical dermatitis   Treatment with certain drugs such as oral retinoids, diuretics and epidermal growth factor receptor inhibitors    People exposed to a dry environment may experience dry skin  Low humidity: in desert climates or cool, windy conditions   Excessive air conditioning   Direct heat from a fire or fan heater   Excessive bathing   Contact with soap, detergents and solvents   Inappropriate topical agents such as alcohol   Frictional irritation from rough clothing or abrasives    Dry skin is due to abnormalities in the integrity of the barrier function of the stratum corneum, which is made up of corneocytes  There is an overall reduction in the lipids in the stratum corneum  Ratio of ceramides, cholesterol and free fatty acids may be normal or altered  There may be a reduction in the proliferation of keratinocytes  Keratinocyte subtypes change in dry skin with a decrease in keratins K1, K10 and increase in K5, K14  Involucrin (a protein) may be expressed early, increasing cell stiffness  The result is retention of corneocytes and reduced water-holding capacity  The inherited forms of ichthyosis are due to loss of function mutations in various genes (listed in parentheses below)  The clinical features of ichthyosis depend on the specific type of ichthyosis:  Ichthyosis vulgaris (FLG)  Recessive X-linked ichthyosis (STS)   Autosomal recessive congenital ichthyosis (ABCA12, TGM1, ALOXE3)   Keratinopathic ichthyoses (KRT1, KRT10, KRT2)    Acquired ichthyosis may be due to:  Metabolic factors: thyroid deficiency   Illness: lymphoma, internal malignancy, sarcoidosis, HIV infection   Drugs: nicotinic acid, kava, protein kinase inhibitors (eg EGFR inhibitors), hydroxyurea  Complications of dry skin:  Dry areas of skin may become itchy, indicating a form of eczema/dermatitis has developed    Atopic eczema -- especially in people with ichthyosis vulgaris   Eczema yung -- especially in elderly people  Also called asteatotic eczema   A dry form of nummular dermatitis/discoid eczema -- especially in people that wash their skin excessively  When the dry skin of an elderly person is itchy without a visible rash, it is sometimes called winter itch, 7th age itch, senile pruritus or chronic pruritus of the elderly  Other complications of dry skin may include:  Skin infection when bacteria or viruses penetrate a break in the skin surface   Overheating, especially in some forms of ichthyosis   Food allergy, eg, to peanuts, has been associated with filaggrin mutations   Contact allergy, eg, to nickel, has also been correlated with barrier function defects  How is the type of dry skin diagnosed? The type of dry skin is diagnosed by careful history and examination  In children:  Family history   Age of onset   Appearance at birth, if known   Distribution of dry skin   Other features, eg eczema, abnormal nails, hair, dentition, sight, hearing  In adults:  Medical history   Medications and topical preparations   Bathing frequency and use of soap   Evaluation of environmental factors that may contribute to dry skin  What is the treatment for dry skin? The mainstay of treatment of dry skin and ichthyosis is moisturisers/emollients  They should be applied liberally and often enough to:  Reduce itch   Improve the barrier function   Prevent entry of irritants, bacteria   Reduce transepidermal water loss  When considering which emollient is most suitable, consider:  Severity of the dryness   Tolerance   Personal preference   Cost and availability  Emollients generally work best if applied to damp skin, if pH is below 7 (acidic), and if containing humectants such as urea or propylene glycol  Additional treatments include:  Topical steroid if itchy or there is dermatitis -- choose an emollient base   Topical calcineurin inhibitors if topical steroids are unsuitable      How can dry skin be prevented? Eliminate aggravating factors:  Reduce the frequency of bathing  A humidifier in winter and air conditioner in summer   Compare having a short shower with a prolonged soak in a bath  Use lukewarm, not hot, water  Replace standard soap with a substitute such as a synthetic detergent cleanser, water-miscible emollient, bath oil, anti-pruritic tar oil, colloidal oatmeal etc    Apply an emollient liberally and often, particularly shortly after bathing, and when itchy  The drier the skin, the thicker this should be, especially on the hands  What is the outlook for dry skin? A tendency to dry skin may persist life-long, or it may improve once contributing factors are controlled

## 2022-06-17 ENCOUNTER — OFFICE VISIT (OUTPATIENT)
Dept: FAMILY MEDICINE CLINIC | Facility: HOSPITAL | Age: 82
End: 2022-06-17
Payer: COMMERCIAL

## 2022-06-17 VITALS
HEIGHT: 68 IN | HEART RATE: 68 BPM | DIASTOLIC BLOOD PRESSURE: 72 MMHG | WEIGHT: 261.6 LBS | BODY MASS INDEX: 39.65 KG/M2 | SYSTOLIC BLOOD PRESSURE: 126 MMHG | TEMPERATURE: 98.7 F

## 2022-06-17 DIAGNOSIS — C61 PROSTATE CANCER (HCC): ICD-10-CM

## 2022-06-17 DIAGNOSIS — R68.2 DRY MOUTH: Primary | ICD-10-CM

## 2022-06-17 DIAGNOSIS — I10 ESSENTIAL HYPERTENSION: ICD-10-CM

## 2022-06-17 PROCEDURE — 3074F SYST BP LT 130 MM HG: CPT | Performed by: FAMILY MEDICINE

## 2022-06-17 PROCEDURE — 1036F TOBACCO NON-USER: CPT | Performed by: FAMILY MEDICINE

## 2022-06-17 PROCEDURE — 99214 OFFICE O/P EST MOD 30 MIN: CPT | Performed by: FAMILY MEDICINE

## 2022-06-17 PROCEDURE — 1160F RVW MEDS BY RX/DR IN RCRD: CPT | Performed by: FAMILY MEDICINE

## 2022-06-17 PROCEDURE — 3078F DIAST BP <80 MM HG: CPT | Performed by: FAMILY MEDICINE

## 2022-06-17 NOTE — PROGRESS NOTES
Assessment/Plan:      Problem List Items Addressed This Visit        Cardiovascular and Mediastinum    Essential hypertension    Relevant Orders    CBC    Comprehensive metabolic panel    Lipid Panel with Direct LDL reflex       Genitourinary    Prostate cancer ([facility] [address] )      Other Visit Diagnoses     Dry mouth    -  Primary           Plan/Discussion:  Dry mouth  Started after eating Doritos  Agree may have been irritation from the spice  Improving  Will monitor  Further evaluation if not improvng or worsenign to include evlauation for sjogren's     Htn  Stable  Update labs prior to next regular visit  Prostate ca  Fu with Urology  Subjective:   Chief Complaint   Patient presents with    Dry mouth        Patient ID: Yudelka Lorenzo is a [de-identified] y o  male  Patient is seen due to dry mouth  Started 3 days ago  This was after eating bag of Doritos  Feels like he has a chemical burn  Has improved since then  Does have saliva  No dental problems  No dry eyes  No change in medication  Has been drinking enough fluids  No fever no chills  No rash or thrush  The following portions of the patient's history were reviewed and updated as appropriate: allergies, current medications, past family history, past medical history, past social history, past surgical history and problem list     Review of Systems   Constitutional: Negative  Negative for activity change, appetite change, chills and diaphoresis  HENT: Negative for congestion and dental problem  Respiratory: Negative  Negative for apnea, chest tightness, shortness of breath and wheezing  Cardiovascular: Negative  Negative for chest pain, palpitations and leg swelling  Gastrointestinal: Negative  Negative for abdominal distention, abdominal pain, constipation, diarrhea and nausea  Genitourinary: Negative  Negative for difficulty urinating, dysuria and frequency           Objective:  Vitals:    06/17/22 0935   BP: 126/72 Pulse: 68   Temp: 98 7 °F (37 1 °C)   TempSrc: Tympanic   Weight: 119 kg (261 lb 9 6 oz)   Height: 5' 8" (1 727 m)     BP Readings from Last 6 Encounters:   06/17/22 126/72   03/03/22 142/84   12/22/21 138/70   11/30/21 128/78   11/17/21 140/80   11/08/21 126/80      Wt Readings from Last 6 Encounters:   06/17/22 119 kg (261 lb 9 6 oz)   05/19/22 119 kg (263 lb)   03/03/22 123 kg (270 lb 3 2 oz)   12/22/21 118 kg (259 lb 12 8 oz)   11/30/21 117 kg (257 lb)   11/17/21 119 kg (261 lb 6 4 oz)             Physical Exam  Vitals and nursing note reviewed  Constitutional:       Appearance: Normal appearance  HENT:      Head: Normocephalic  Right Ear: Tympanic membrane, ear canal and external ear normal       Left Ear: Tympanic membrane, ear canal and external ear normal       Nose: Nose normal       Mouth/Throat:      Lips: Pink  Mouth: Mucous membranes are moist  No lacerations, oral lesions or angioedema  Dentition: Normal dentition  No dental tenderness, dental caries or gum lesions  Tongue: No lesions  Tongue does not deviate from midline  Palate: No mass  Pharynx: Oropharynx is clear  Tonsils: No tonsillar exudate or tonsillar abscesses  Eyes:      Extraocular Movements: Extraocular movements intact  Pupils: Pupils are equal, round, and reactive to light  Cardiovascular:      Rate and Rhythm: Regular rhythm  Heart sounds: Normal heart sounds  Musculoskeletal:      Cervical back: Neck supple  Neurological:      Mental Status: He is alert  GCS: GCS eye subscore is 4  GCS verbal subscore is 5  GCS motor subscore is 6  Cranial Nerves: Cranial nerves are intact  Sensory: Sensation is intact  Motor: Motor function is intact  Coordination: Coordination is intact  Gait: Gait is intact

## 2022-06-21 ENCOUNTER — TELEPHONE (OUTPATIENT)
Dept: FAMILY MEDICINE CLINIC | Facility: HOSPITAL | Age: 82
End: 2022-06-21

## 2022-06-21 DIAGNOSIS — R68.2 DRY MOUTH: Primary | ICD-10-CM

## 2022-06-21 NOTE — TELEPHONE ENCOUNTER
Patient came into office with wife for her appointment  He had an appointment with Dr Driss Nichole on Friday, June 17  Patient complained that his dry mouth condition is not any better and he was told to contact Dr Driss Nichole to get an order to see a specialist     Told patient that Dr Driss Nichole was not in office today but that he would get this message tomorrow

## 2022-06-24 ENCOUNTER — APPOINTMENT (OUTPATIENT)
Dept: LAB | Facility: HOSPITAL | Age: 82
End: 2022-06-24
Payer: COMMERCIAL

## 2022-06-24 DIAGNOSIS — R68.2 DRY MOUTH: ICD-10-CM

## 2022-06-24 DIAGNOSIS — I10 ESSENTIAL HYPERTENSION: ICD-10-CM

## 2022-06-24 LAB
ALBUMIN SERPL BCP-MCNC: 3.5 G/DL (ref 3.5–5)
ALP SERPL-CCNC: 66 U/L (ref 46–116)
ALT SERPL W P-5'-P-CCNC: 34 U/L (ref 12–78)
ANION GAP SERPL CALCULATED.3IONS-SCNC: 2 MMOL/L (ref 4–13)
AST SERPL W P-5'-P-CCNC: 19 U/L (ref 5–45)
BILIRUB SERPL-MCNC: 0.74 MG/DL (ref 0.2–1)
BUN SERPL-MCNC: 17 MG/DL (ref 5–25)
CALCIUM SERPL-MCNC: 9.2 MG/DL (ref 8.3–10.1)
CHLORIDE SERPL-SCNC: 110 MMOL/L (ref 100–108)
CHOLEST SERPL-MCNC: 183 MG/DL
CO2 SERPL-SCNC: 26 MMOL/L (ref 21–32)
CREAT SERPL-MCNC: 1.04 MG/DL (ref 0.6–1.3)
ERYTHROCYTE [DISTWIDTH] IN BLOOD BY AUTOMATED COUNT: 12.5 % (ref 11.6–15.1)
GFR SERPL CREATININE-BSD FRML MDRD: 67 ML/MIN/1.73SQ M
GLUCOSE P FAST SERPL-MCNC: 94 MG/DL (ref 65–99)
HCT VFR BLD AUTO: 43.5 % (ref 36.5–49.3)
HDLC SERPL-MCNC: 38 MG/DL
HGB BLD-MCNC: 15.2 G/DL (ref 12–17)
LDLC SERPL CALC-MCNC: 125 MG/DL (ref 0–100)
MCH RBC QN AUTO: 32 PG (ref 26.8–34.3)
MCHC RBC AUTO-ENTMCNC: 34.9 G/DL (ref 31.4–37.4)
MCV RBC AUTO: 92 FL (ref 82–98)
PLATELET # BLD AUTO: 176 THOUSANDS/UL (ref 149–390)
PMV BLD AUTO: 9.3 FL (ref 8.9–12.7)
POTASSIUM SERPL-SCNC: 4.4 MMOL/L (ref 3.5–5.3)
PROT SERPL-MCNC: 7 G/DL (ref 6.4–8.2)
RBC # BLD AUTO: 4.75 MILLION/UL (ref 3.88–5.62)
SODIUM SERPL-SCNC: 138 MMOL/L (ref 136–145)
TRIGL SERPL-MCNC: 100 MG/DL
WBC # BLD AUTO: 4.92 THOUSAND/UL (ref 4.31–10.16)

## 2022-06-24 PROCEDURE — 86038 ANTINUCLEAR ANTIBODIES: CPT

## 2022-06-24 PROCEDURE — 80053 COMPREHEN METABOLIC PANEL: CPT

## 2022-06-24 PROCEDURE — 36415 COLL VENOUS BLD VENIPUNCTURE: CPT

## 2022-06-24 PROCEDURE — 80061 LIPID PANEL: CPT

## 2022-06-24 PROCEDURE — 86235 NUCLEAR ANTIGEN ANTIBODY: CPT

## 2022-06-24 PROCEDURE — 85027 COMPLETE CBC AUTOMATED: CPT

## 2022-06-25 LAB
ENA SS-A AB SER-ACNC: 0.2 AI (ref 0–0.9)
ENA SS-B AB SER-ACNC: <0.2 AI (ref 0–0.9)

## 2022-06-27 LAB — RYE IGE QN: NEGATIVE

## 2022-09-08 ENCOUNTER — OFFICE VISIT (OUTPATIENT)
Dept: FAMILY MEDICINE CLINIC | Facility: HOSPITAL | Age: 82
End: 2022-09-08
Payer: COMMERCIAL

## 2022-09-08 VITALS
HEIGHT: 68 IN | DIASTOLIC BLOOD PRESSURE: 70 MMHG | HEART RATE: 60 BPM | WEIGHT: 266.2 LBS | BODY MASS INDEX: 40.35 KG/M2 | SYSTOLIC BLOOD PRESSURE: 120 MMHG | TEMPERATURE: 96.8 F

## 2022-09-08 DIAGNOSIS — C61 PROSTATE CANCER (HCC): ICD-10-CM

## 2022-09-08 DIAGNOSIS — G57.12 MERALGIA PARESTHETICA OF LEFT SIDE: ICD-10-CM

## 2022-09-08 DIAGNOSIS — I82.4Y1 DEEP VEIN THROMBOSIS (DVT) OF PROXIMAL VEIN OF RIGHT LOWER EXTREMITY, UNSPECIFIED CHRONICITY (HCC): ICD-10-CM

## 2022-09-08 DIAGNOSIS — Z00.00 MEDICARE ANNUAL WELLNESS VISIT, SUBSEQUENT: Primary | ICD-10-CM

## 2022-09-08 PROCEDURE — 3074F SYST BP LT 130 MM HG: CPT | Performed by: FAMILY MEDICINE

## 2022-09-08 PROCEDURE — G0439 PPPS, SUBSEQ VISIT: HCPCS | Performed by: FAMILY MEDICINE

## 2022-09-08 PROCEDURE — 3288F FALL RISK ASSESSMENT DOCD: CPT | Performed by: FAMILY MEDICINE

## 2022-09-08 PROCEDURE — 1160F RVW MEDS BY RX/DR IN RCRD: CPT | Performed by: FAMILY MEDICINE

## 2022-09-08 PROCEDURE — 3725F SCREEN DEPRESSION PERFORMED: CPT | Performed by: FAMILY MEDICINE

## 2022-09-08 PROCEDURE — 1170F FXNL STATUS ASSESSED: CPT | Performed by: FAMILY MEDICINE

## 2022-09-08 PROCEDURE — 1125F AMNT PAIN NOTED PAIN PRSNT: CPT | Performed by: FAMILY MEDICINE

## 2022-09-08 PROCEDURE — 3078F DIAST BP <80 MM HG: CPT | Performed by: FAMILY MEDICINE

## 2022-09-08 PROCEDURE — 99214 OFFICE O/P EST MOD 30 MIN: CPT | Performed by: FAMILY MEDICINE

## 2022-09-08 NOTE — PROGRESS NOTES
Assessment and Plan:     Problem List Items Addressed This Visit        Cardiovascular and Mediastinum    Deep vein thrombosis (DVT) of proximal lower extremity ([facility] )       Genitourinary    Prostate cancer ([facility] [facility] )      Other Visit Diagnoses     Medicare annual wellness visit, subsequent    -  Primary    Meralgia paresthetica of left side          discussed meralgia paresthetica  dsicussed looser belt or using suspecnders  Tylenol as needed  Monitor  [facility], and age appropriate screening tests were ordered as noted in patient's After Visit Summary  Personalized health advice and appropriate referrals for health education or preventive services given if needed, as noted in patient's After Visit Summary  History of Present Illness:     Patient presents for a Medicare Wellness Visit    Pt here for fu of chronic conditions and awv  Reports having some burning and numbness on the left leg  Noted this in the past as well when he woul dwear a tool belt  Had improvement when he stopped wearing this     Dvt  On xarelto  Doing well  Prostate ca  Ongoing fu with urology  Last psa at 0 8  Patient Care Team:  Adam Baez MD as PCP - General (Family Medicine)  Sabrina Goncalves MD as PCP - [address] [place] [address] (RTE)  Lillian Sandra MD     Review of Systems:     Review of Systems   Constitutional: Negative  Negative for activity change, appetite change, chills and diaphoresis  HENT: Negative for congestion and dental problem  Respiratory: Negative  Negative for apnea, chest tightness, shortness of breath and wheezing  Cardiovascular: Negative  Negative for chest pain, palpitations and leg swelling  Gastrointestinal: Negative  Negative for abdominal distention, abdominal pain, constipation, diarrhea and nausea  Genitourinary: Negative  Negative for difficulty urinating, dysuria and frequency          Problem List:     Patient Active Problem List   Diagnosis    Essential hypertension    Personal history of prostate cancer    AKIRA (stress urinary incontinence), male    Bladder neck contracture    Bilateral carpal tunnel syndrome    Obesity, morbid ([facility] )    Protrusion of lumbar intervertebral disc    Lumbar radiculopathy    Deep vein thrombosis (DVT) of proximal lower extremity (HCC)    Chronic pain syndrome    Lumbar spondylosis    Prostate cancer [facility])      Past Medical and Surgical History:     Past Medical History:   Diagnosis Date    Arthritis     Bladder neck contracture     Cancer ([facility] )     ED (erectile dysfunction)     Frequency of urination     Hypercholesterolemia     Hypertension     17    Nocturia     Prostate cancer ([facility] )     16    Stress incontinence      Past Surgical History:   Procedure Laterality Date    BLADDER SURGERY      Bladder Neck Restriction, SP Catheter    CARPAL TUNNEL RELEASE      CHOLECYSTECTOMY      CYSTOSTOMY W/ BLADDER DILATION      PROSTATECTOMY      Radical    URETHRAL SLING        Family History:     Family History   Problem Relation Age of Onset    Coronary artery disease Father         involving native coronary artery of native heart, angina presence unspecified      Social History:     Social History     Socioeconomic History    Marital status: /Civil Union     Spouse name: None    Number of children: None    Years of education: None    Highest education level: None   Occupational History    None   Tobacco Use    Smoking status: Former Smoker     Packs/day: 1 50     Years: 12 00     Pack years: 18 00     Types: Cigarettes     Start date: 58     Quit date:      Years since quittin 7    Smokeless tobacco: Never Used   Vaping Use    Vaping Use: Never used   Substance and Sexual Activity    Alcohol use: No    Drug use: No    Sexual activity: None   Other Topics Concern    None   Social History Narrative    None     Social Determinants of Health     Financial Resource Strain: Low Risk     Difficulty of Paying Living Expenses: Not hard at all   Food Insecurity: Not on file   Transportation Needs: No Transportation Needs    Lack of Transportation (Medical): No    Lack of Transportation (Non-Medical): No   Physical Activity: Not on file   Stress: Not on file   Social Connections: Not on file   Intimate Partner Violence: Not on file   Housing Stability: Not on file      Medications and Allergies:     Current Outpatient Medications   Medication Sig Dispense Refill    lisinopril (ZESTRIL) 5 mg tablet Take 1 tablet (5 mg total) by mouth daily 90 tablet 3    rivaroxaban (Xarelto) 20 mg tablet Take 1 tablet (20 mg total) by mouth daily 90 tablet 0     No current facility-administered medications for this visit  No Known Allergies   Immunizations:     Immunization History   Administered Date(s) Administered    COVID-19 MODERNA VACC 0 5 ML IM 01/19/2021, 02/17/2021, 12/07/2021    Influenza, high dose seasonal 0 7 mL 11/30/2020, 12/22/2021    Pneumococcal Conjugate 13-Valent 12/13/2017    Pneumococcal Polysaccharide PPV23 05/17/2007      Health Maintenance: There are no preventive care reminders to display for this patient  Topic Date Due    COVID-19 Vaccine (4 - Booster for Moderna series) 04/07/2022    Influenza Vaccine (1) 09/01/2022      Medicare Screening Tests and Risk Assessments:     Logan Lloyd is here for his Subsequent Wellness visit  Health Risk Assessment:   Patient rates overall health as good  Patient feels that their physical health rating is same  Patient is satisfied with their life  Eyesight was rated as same  Hearing was rated as same  Patient feels that their emotional and mental health rating is same  Patients states they are sometimes angry  Patient states they are sometimes unusually tired/fatigued  Pain experienced in the last 7 days has been some  Patient's pain rating has been 4/10   Patient states that he has experienced no weight loss or gain in last 6 months  Depression Screening:   PHQ-2 Score: 1      Fall Risk Screening: In the past year, patient has experienced: no history of falling in past year      Home Safety:  Patient does not have trouble with stairs inside or outside of their home  Patient has working smoke alarms and has working carbon monoxide detector  Home safety hazards include: none  Nutrition:   Current diet is Regular  Medications:   Patient is not currently taking any over-the-counter supplements  Patient is able to manage medications  Activities of Daily Living (ADLs)/Instrumental Activities of Daily Living (IADLs):   Walk and transfer into and out of bed and chair?: Yes  Dress and groom yourself?: Yes    Bathe or shower yourself?: Yes    Feed yourself? Yes  Do your laundry/housekeeping?: Yes  Manage your money, pay your bills and track your expenses?: Yes  Make your own meals?: Yes    Do your own shopping?: Yes    Previous Hospitalizations:   Any hospitalizations or ED visits within the last 12 months?: No      Advance Care Planning:   Living will: Yes    Durable POA for healthcare:  Yes    Advanced directive: Yes      Cognitive Screening:   Provider or family/friend/caregiver concerned regarding cognition?: No    PREVENTIVE SCREENINGS      Cardiovascular Screening:    General: Screening Current      Diabetes Screening:     General: Screening Current      Colorectal Cancer Screening:     General: Screening Not Indicated      Prostate Cancer Screening:    General: History Prostate Cancer and Screening Not Indicated      Osteoporosis Screening:    General: Screening Not Indicated      Abdominal Aortic Aneurysm (AAA) Screening:    Risk factors include: tobacco use        General: Screening Not Indicated      Lung Cancer Screening:     General: Screening Not Indicated      Hepatitis C Screening:    General: Screening Not Indicated    Screening, Brief Intervention, and Referral to Treatment (SBIRT)    Screening  Typical number of drinks in a day: 0  Typical number of drinks in a week: 0  Interpretation: Low risk drinking behavior  Single Item Drug Screening:  How often have you used an illegal drug (including marijuana) or a prescription medication for non-medical reasons in the past year? never    Single Item Drug Screen Score: 0  Interpretation: Negative screen for possible drug use disorder     Visual Acuity Screening    Right eye Left eye Both eyes   Without correction:      With correction: 20/40 20/40 20/30        Physical Exam:     /70   Pulse 60   Temp (!) 96 8 °F (36 °C)   Ht 5' 8" (1 727 m)   Wt 121 kg (266 lb 3 2 oz)   BMI 40 48 kg/m²     Physical Exam  Vitals and nursing note reviewed  Constitutional:       General: He is not in acute distress  Appearance: Normal appearance  He is well-developed  He is obese  He is not ill-appearing  HENT:      Head: Normocephalic and atraumatic  Right Ear: External ear normal       Left Ear: Ear canal and external ear normal       Mouth/Throat:      Mouth: Mucous membranes are moist       Pharynx: Oropharynx is clear  Eyes:      Extraocular Movements: Extraocular movements intact  Conjunctiva/sclera: Conjunctivae normal       Pupils: Pupils are equal, round, and reactive to light  Cardiovascular:      Rate and Rhythm: Normal rate and regular rhythm  Heart sounds: Normal heart sounds  No murmur heard  Pulmonary:      Effort: Pulmonary effort is normal       Breath sounds: Normal breath sounds  Abdominal:      General: Bowel sounds are normal  There is no distension  Palpations: Abdomen is soft  There is no mass  Tenderness: There is no abdominal tenderness  There is no guarding  Hernia: No hernia is present  Genitourinary:     Penis: Normal     Musculoskeletal:         General: Normal range of motion  Cervical back: Normal range of motion and neck supple     Skin:     General: Skin is warm and dry  Capillary Refill: Capillary refill takes less than 2 seconds  Neurological:      General: No focal deficit present  Mental Status: He is alert and oriented to person, place, and time     Psychiatric:         Mood and Affect: Mood normal          Behavior: Behavior normal           Christian Hightower MD

## 2022-09-08 NOTE — PATIENT INSTRUCTIONS
Medicare Preventive Visit Patient Instructions  Thank you for completing your Welcome to Medicare Visit or Medicare Annual Wellness Visit today  Your next wellness visit will be due in one year (9/9/2023)  The screening/preventive services that you may require over the next 5-10 years are detailed below  Some tests may not apply to you based off risk factors and/or age  Screening tests ordered at today's visit but not completed yet may show as past due  Also, please note that scanned in results may not display below  Preventive Screenings:  Service Recommendations Previous Testing/Comments   Colorectal Cancer Screening  · Colonoscopy    · Fecal Occult Blood Test (FOBT)/Fecal Immunochemical Test (FIT)  · Fecal DNA/Cologuard Test  · Flexible Sigmoidoscopy Age: 39-70 years old   Colonoscopy: every 10 years (May be performed more frequently if at higher risk)  OR  FOBT/FIT: every 1 year  OR  Cologuard: every 3 years  OR  Sigmoidoscopy: every 5 years  Screening may be recommended earlier than age 39 if at higher risk for colorectal cancer  Also, an individualized decision between you and your healthcare provider will decide whether screening between the ages of 74-80 would be appropriate   Colonoscopy: Not on file  FOBT/FIT: Not on file  Cologuard: Not on file  Sigmoidoscopy: Not on file          Prostate Cancer Screening Individualized decision between patient and health care provider in men between ages of 53-78   Medicare will cover every 12 months beginning on the day after your 50th birthday PSA: No results in last 5 years     History Prostate Cancer  Screening Not Indicated     Hepatitis C Screening Once for adults born between 1945 and 1965  More frequently in patients at high risk for Hepatitis C Hep C Antibody: Not on file        Diabetes Screening 1-2 times per year if you're at risk for diabetes or have pre-diabetes Fasting glucose: 94 mg/dL (6/24/2022)  A1C: No results in last 5 years (No results in last 5 years)  Screening Current   Cholesterol Screening Once every 5 years if you don't have a lipid disorder  May order more often based on risk factors  Lipid panel: 06/24/2022  Screening Current      Other Preventive Screenings Covered by Medicare:  1  Abdominal Aortic Aneurysm (AAA) Screening: covered once if your at risk  You're considered to be at risk if you have a family history of AAA or a male between the age of 73-68 who smoking at least 100 cigarettes in your lifetime  2  Lung Cancer Screening: covers low dose CT scan once per year if you meet all of the following conditions: (1) Age 50-69; (2) No signs or symptoms of lung cancer; (3) Current smoker or have quit smoking within the last 15 years; (4) You have a tobacco smoking history of at least 20 pack years (packs per day x number of years you smoked); (5) You get a written order from a healthcare provider  3  Glaucoma Screening: covered annually if you're considered high risk: (1) You have diabetes OR (2) Family history of glaucoma OR (3)  aged 48 and older OR (3)  American aged 72 and older  3  Osteoporosis Screening: covered every 2 years if you meet one of the following conditions: (1) Have a vertebral abnormality; (2) On glucocorticoid therapy for more than 3 months; (3) Have primary hyperparathyroidism; (4) On osteoporosis medications and need to assess response to drug therapy  5  HIV Screening: covered annually if you're between the age of 12-76  Also covered annually if you are younger than 13 and older than 72 with risk factors for HIV infection  For pregnant patients, it is covered up to 3 times per pregnancy      Immunizations:  Immunization Recommendations   Influenza Vaccine Annual influenza vaccination during flu season is recommended for all persons aged >= 6 months who do not have contraindications   Pneumococcal Vaccine   * Pneumococcal conjugate vaccine = PCV13 (Prevnar 13), PCV15 (Vaxneuvance), PCV20 (Prevnar 20)  * Pneumococcal polysaccharide vaccine = PPSV23 (Pneumovax) Adults 2364 years old: 1-3 doses may be recommended based on certain risk factors  Adults 72 years old: 1-2 doses may be recommended based off what pneumonia vaccine you previously received   Hepatitis B Vaccine 3 dose series if at intermediate or high risk (ex: diabetes, end stage renal disease, liver disease)   Tetanus (Td) Vaccine - COST NOT COVERED BY MEDICARE PART B Following completion of primary series, a booster dose should be given every 10 years to maintain immunity against tetanus  Td may also be given as tetanus wound prophylaxis  Tdap Vaccine - COST NOT COVERED BY MEDICARE PART B Recommended at least once for all adults  For pregnant patients, recommended with each pregnancy  Shingles Vaccine (Shingrix) - COST NOT COVERED BY MEDICARE PART B  2 shot series recommended in those aged 48 and above     Health Maintenance Due:  There are no preventive care reminders to display for this patient  Immunizations Due:      Topic Date Due    COVID-19 Vaccine (4 - Booster for Moderna series) 04/07/2022    Influenza Vaccine (1) 09/01/2022     Advance Directives   What are advance directives? Advance directives are legal documents that state your wishes and plans for medical care  These plans are made ahead of time in case you lose your ability to make decisions for yourself  Advance directives can apply to any medical decision, such as the treatments you want, and if you want to donate organs  What are the types of advance directives? There are many types of advance directives, and each state has rules about how to use them  You may choose a combination of any of the following:  · Living will: This is a written record of the treatment you want  You can also choose which treatments you do not want, which to limit, and which to stop at a certain time  This includes surgery, medicine, IV fluid, and tube feedings     · Durable power of  for healthcare [facility]): This is a written record that states who you want to make healthcare choices for you when you are unable to make them for yourself  This person, called a proxy, is usually a family member or a friend  You may choose more than 1 proxy  · Do not resuscitate (DNR) order:  A DNR order is used in case your heart stops beating or you stop breathing  It is a request not to have certain forms of treatment, such as CPR  A DNR order may be included in other types of advance directives  · Medical directive: This covers the care that you want if you are in a coma, near death, or unable to make decisions for yourself  You can list the treatments you want for each condition  Treatment may include pain medicine, surgery, blood transfusions, dialysis, IV or tube feedings, and a ventilator (breathing machine)  · Values history: This document has questions about your views, beliefs, and how you feel and think about life  This information can help others choose the care that you would choose  Why are advance directives important? An advance directive helps you control your care  Although spoken wishes may be used, it is better to have your wishes written down  Spoken wishes can be misunderstood, or not followed  Treatments may be given even if you do not want them  An advance directive may make it easier for your family to make difficult choices about your care  Weight Management   Why it is important to manage your weight:  Being overweight increases your risk of health conditions such as heart disease, high blood pressure, type 2 diabetes, and certain types of cancer  It can also increase your risk for osteoarthritis, sleep apnea, and other respiratory problems  Aim for a slow, steady weight loss  Even a small amount of weight loss can lower your risk of health problems  How to lose weight safely:  A safe and healthy way to lose weight is to eat fewer calories and get regular exercise   You can lose up about 1 pound a week by decreasing the number of calories you eat by 500 calories each day  Healthy meal plan for weight management:  A healthy meal plan includes a variety of foods, contains fewer calories, and helps you stay healthy  A healthy meal plan includes the following:  · Eat whole-grain foods more often  A healthy meal plan should contain fiber  Fiber is the part of grains, fruits, and vegetables that is not broken down by your body  Whole-grain foods are healthy and provide extra fiber in your diet  Some examples of whole-grain foods are whole-wheat breads and pastas, oatmeal, brown rice, and bulgur  · Eat a variety of vegetables every day  Include dark, leafy greens such as spinach, kale, sapna greens, and mustard greens  Eat yellow and orange vegetables such as carrots, sweet potatoes, and winter squash  · Eat a variety of fruits every day  Choose fresh or canned fruit (canned in its own juice or light syrup) instead of juice  Fruit juice has very little or no fiber  · Eat low-fat dairy foods  Drink fat-free (skim) milk or 1% milk  Eat fat-free yogurt and low-fat cottage cheese  Try low-fat cheeses such as mozzarella and other reduced-fat cheeses  · Choose meat and other protein foods that are low in fat  Choose beans or other legumes such as split peas or lentils  Choose fish, skinless poultry (chicken or turkey), or lean cuts of red meat (beef or pork)  Before you cook meat or poultry, cut off any visible fat  · Use less fat and oil  Try baking foods instead of frying them  Add less fat, such as margarine, sour cream, regular salad dressing and mayonnaise to foods  Eat fewer high-fat foods  Some examples of high-fat foods include french fries, doughnuts, ice cream, and cakes  · Eat fewer sweets  Limit foods and drinks that are high in sugar  This includes candy, cookies, regular soda, and sweetened drinks    Exercise:  Exercise at least 30 minutes per day on most days of the week  Some examples of exercise include walking, biking, dancing, and swimming  You can also fit in more physical activity by taking the stairs instead of the elevator or parking farther away from stores  Ask your healthcare provider about the best exercise plan for you  © Copyright [employer] 2018 Information is for End User's use only and may not be sold, redistributed or otherwise used for commercial purposes   All illustrations and images included in CareNotes® are the copyrighted property of A D A M , Inc  or [address] [place] [address]

## 2022-11-24 DIAGNOSIS — I82.4Y1 ACUTE DEEP VEIN THROMBOSIS (DVT) OF PROXIMAL VEIN OF RIGHT LOWER EXTREMITY (HCC): ICD-10-CM

## 2022-11-24 RX ORDER — RIVAROXABAN 20 MG/1
TABLET, FILM COATED ORAL
Qty: 90 TABLET | Refills: 0 | Status: SHIPPED | OUTPATIENT
Start: 2022-11-24

## 2022-11-27 ENCOUNTER — OFFICE VISIT (OUTPATIENT)
Dept: URGENT CARE | Facility: CLINIC | Age: 82
End: 2022-11-27

## 2022-11-27 ENCOUNTER — TELEPHONE (OUTPATIENT)
Dept: URGENT CARE | Facility: CLINIC | Age: 82
End: 2022-11-27

## 2022-11-27 VITALS
OXYGEN SATURATION: 97 % | SYSTOLIC BLOOD PRESSURE: 124 MMHG | RESPIRATION RATE: 16 BRPM | HEART RATE: 74 BPM | DIASTOLIC BLOOD PRESSURE: 67 MMHG | TEMPERATURE: 98.5 F

## 2022-11-27 DIAGNOSIS — U07.1 COVID: Primary | ICD-10-CM

## 2022-11-27 NOTE — PATIENT INSTRUCTIONS
Because you are on Xarelto I am unable to give you Paxlovid    Check with your family doctor tomorrow to see if there any other appropriate antiviral medications

## 2022-11-27 NOTE — PROGRESS NOTES
[address]        NAME: Janine Adamson is a [de-identified] y o  male  : 1940    MRN: [id number]  DATE: 2022  TIME: 9:00 AM    Assessment and Plan   COVID [U07 1]  1  COVID              Patient Instructions       Follow up with PCP in 3-5 days  Proceed to  ER if symptoms worsen  Chief Complaint     Chief Complaint   Patient presents with   • COVID-19     Pt tested positive for covid yesterday  Symptoms started Thursday  Pt reports sore throat, congestion, headache, sinus pressure, and coughing  History of Present Illness       Positive COVID test today  He is coughing but not short of breath      Review of Systems   Review of Systems   Respiratory: Positive for cough  Negative for shortness of breath            Current Medications       Current Outpatient Medications:   •  lisinopril (ZESTRIL) 5 mg tablet, Take 1 tablet (5 mg total) by mouth daily, Disp: 90 tablet, Rfl: 3  •  Xarelto 20 MG tablet, TAKE ONE BY MOUTH DAILY, Disp: 90 tablet, Rfl: 0    Current Allergies     Allergies as of 2022   • (No Known Allergies)            The following portions of the patient's history were reviewed and updated as appropriate: allergies, current medications, past family history, past medical history, past social history, past surgical history and problem list      Past Medical History:   Diagnosis Date   • Arthritis    • Bladder neck contracture    • Cancer [facility])    • ED (erectile dysfunction)    • Frequency of urination    • Hypercholesterolemia    • Hypertension     17   • Nocturia    • Prostate cancer ([facility] [address] )     16   • Stress incontinence        Past Surgical History:   Procedure Laterality Date   • BLADDER SURGERY      Bladder Neck Restriction, SP Catheter   • CARPAL TUNNEL RELEASE     • CHOLECYSTECTOMY     • CYSTOSTOMY W/ BLADDER DILATION     • PROSTATECTOMY      Radical   • URETHRAL SLING         Family History   Problem Relation Age of Onset   • Coronary artery disease Father         involving native coronary artery of native heart, angina presence unspecified         Medications have been verified  Objective   /67   Pulse 74   Temp 98 5 °F (36 9 °C) (Tympanic Core)   Resp 16   SpO2 97%   No LMP for male patient  Physical Exam     Physical Exam  Pulmonary:      Breath sounds: Normal breath sounds  No wheezing, rhonchi or rales

## 2022-11-28 ENCOUNTER — TELEMEDICINE (OUTPATIENT)
Dept: FAMILY MEDICINE CLINIC | Facility: HOSPITAL | Age: 82
End: 2022-11-28

## 2022-11-28 ENCOUNTER — TELEPHONE (OUTPATIENT)
Dept: FAMILY MEDICINE CLINIC | Facility: HOSPITAL | Age: 82
End: 2022-11-28

## 2022-11-28 VITALS — WEIGHT: 260 LBS | HEIGHT: 68 IN | BODY MASS INDEX: 39.4 KG/M2

## 2022-11-28 DIAGNOSIS — U07.1 COVID-19: Primary | ICD-10-CM

## 2022-11-28 NOTE — TELEPHONE ENCOUNTER
Pt tested covid positive at home on Saturday Sunday went to  and due to being on Xarelto  Was advised to call PCP to see what else can be done  Symptoms are cough, sore throat, runny nose, chills but no fever  No there symptoms  Taking Coricidin, Cepacol and Airborne  Symptoms started Thursday 11/24   PCB

## 2022-11-28 NOTE — PROGRESS NOTES
COVID-19 Outpatient Progress Note    Assessment/Plan:    Problem List Items Addressed This Visit    None  Visit Diagnoses     COVID-19    -  Primary    Relevant Medications    molnupiravir 200 mg capsule         Disposition:     Patient has asymptomatic or mild COVID-19 infection  Based off CDC guidelines, they were recommended to isolate for 5 days  If they are asymptomatic or symptoms are improving with no fevers in the past 24 hours, isolation may be ended followed by 5 days of wearing a mask when around othes to minimize risk of infecting others  If still have a fever or other symptoms have not improved, continue to isolate until they improve  Regardless of when they end isolation, avoid being around people who are more likely to get very sick from COVID-19 until at least day 11  He is not candidate for paxlovid given Eliquis therapy  Agreeable to molnupiravir  Risks/benefits/ AE discussed  Call if no better or worsening  I have spent 8 minutes directly with the patient  Greater than 50% of this time was spent in counseling/coordination of care regarding: risks and benefits of treatment options, instructions for management and impressions  Encounter provider: DAREN Bailey     Provider located at: [facility] [address] [facility]   [address] [facility] [id number]  [facility] [address]     Recent Visits  No visits were found meeting these conditions  Showing recent visits within past 7 days and meeting all other requirements  Today's Visits  Date Type Provider Dept   11/28/22 [address], CRNP [facility] [address]   11/28/22 Telephone Bobby Garcia MD [facility] [address]   Showing today's visits and meeting all other requirements  Future Appointments  No visits were found meeting these conditions    Showing future appointments within next 150 days and meeting all other requirements     This virtual check-in was done via telephone and he agrees to proceed  Patient agrees to participate in a virtual check in via telephone or video visit instead of presenting to the office to address urgent/immediate medical needs  Patient is aware this is a billable service  He acknowledged consent and understanding of privacy and security of the video platform  The patient has agreed to participate and understands they can discontinue the visit at any time  After connecting through Telephone, the patient was identified by name and date of birth  Anton Cope was informed that this was a telemedicine visit and that the exam was being conducted confidentially over secure lines  My office door was closed  No one else was in the room  Anton Cope acknowledged consent and understanding of privacy and security of the telemedicine visit  I informed the patient that I have reviewed his record in Epic and presented the opportunity for him to ask any questions regarding the visit today  The patient agreed to participate  It was my intent to perform this visit via video technology but the patient was not able to do a video connection so the visit was completed via audio telephone only  Verification of patient location:  Patient is located in the following state in which I hold an active license: [place]    Subjective:   Anton Cope is a [de-identified] y o  male who has been screened for COVID-19  Symptom change since last report: improving  Patient's symptoms include chills, fatigue, nasal congestion, rhinorrhea, cough and diarrhea  Patient denies fever, malaise, sore throat, anosmia, loss of taste, shortness of breath, chest tightness, abdominal pain, nausea, vomiting, myalgias and headaches  - Date of symptom onset: 11/24/2022  - Date of positive COVID-19 test: 11/26/2022  Type of test: Home antigen  Patient with typical symptoms of COVID-19 and they attest that they were positive on home rapid antigen testing   Image of positive result is not able to be uploaded into their chart  COVID-19 vaccination status: Fully vaccinated with booster    Nicky Smith has been staying home and has isolated themselves in his home  He is taking care to not share personal items and is cleaning all surfaces that are touched often, like counters, tabletops, and doorknobs using household cleaning sprays or wipes  He is wearing a mask when he leaves his room  No results found for: Alta Bradley, SARSCORONAVI, CORONAVIRUSR, SARSCOVAG, [address] [facility]    Review of Systems   Constitutional: Positive for chills and fatigue  Negative for fever  HENT: Positive for congestion and rhinorrhea  Negative for sore throat  Respiratory: Positive for cough  Negative for chest tightness and shortness of breath  Gastrointestinal: Positive for diarrhea  Negative for abdominal pain, nausea and vomiting  Musculoskeletal: Negative for myalgias  Neurological: Negative for headaches  Current Outpatient Medications on File Prior to Visit   Medication Sig   • lisinopril (ZESTRIL) 5 mg tablet Take 1 tablet (5 mg total) by mouth daily   • Xarelto 20 MG tablet TAKE ONE BY MOUTH DAILY       Objective:    Ht 5' 8" (1 727 m)   Wt 118 kg (260 lb)   BMI 39 53 kg/m²      Physical Exam  Vitals reviewed  Pulmonary:      Effort: Pulmonary effort is normal    Neurological:      Mental Status: He is alert and oriented to person, place, and time  Psychiatric:         Mood and Affect: Mood normal          Behavior: Behavior normal          Thought Content:  Thought content normal          Judgment: Judgment normal        Zollie Lanes, CRNP

## 2022-12-06 DIAGNOSIS — I10 ESSENTIAL HYPERTENSION: ICD-10-CM

## 2022-12-06 RX ORDER — LISINOPRIL 5 MG/1
TABLET ORAL
Qty: 90 TABLET | Refills: 3 | Status: SHIPPED | OUTPATIENT
Start: 2022-12-06

## 2022-12-07 ENCOUNTER — TELEPHONE (OUTPATIENT)
Dept: FAMILY MEDICINE CLINIC | Facility: HOSPITAL | Age: 82
End: 2022-12-07

## 2022-12-07 DIAGNOSIS — I82.4Y1 ACUTE DEEP VEIN THROMBOSIS (DVT) OF PROXIMAL VEIN OF RIGHT LOWER EXTREMITY (HCC): ICD-10-CM

## 2022-12-07 RX ORDER — RIVAROXABAN 20 MG/1
TABLET, FILM COATED ORAL
Qty: 90 TABLET | Refills: 0 | Status: SHIPPED | OUTPATIENT
Start: 2022-12-07 | End: 2022-12-07 | Stop reason: SDUPTHER

## 2022-12-07 NOTE — TELEPHONE ENCOUNTER
Xarelto 20 MG tablet     The [employer] order company had a mishap and patient will need about a weeks worth of pills sent to the [facility]   Is this possible?

## 2022-12-14 NOTE — DISCHARGE INSTRUCTIONS
[address] [facility]. [place], [address]  Tel.  [phone]  Fax. [address] [facility] [address]  [place], [address] [facility]  Tel.  [phone]  Fax. [phone] [address]  Tel.  [phone]  Fax. [phone]     Sleep Study Technical Notes        PRE-Test:  Ana Lawrence (: 2002) arrived in the lobby. Patient was greeted and screening questions asked. The patient was taken to the [facility] Center and taken directly to his/her room. Vitals:  Temperature (98.3)   BP (125/83)   SaO2 (98)   Weight per patient (319)  Procedure explained to the patient and questions were answered. The patient expressed understanding of the procedure. Electrodes were applied without incident. The patient was placed in bed and the study was started. Sleep aid taken:  No      Acquisition Notes:  Lights off: 9:24 pm    Respiratory events: A few hypopneas, Hypercapnia   ECG:  NSR  Snoring:  None   PAP titration: n/a  Eliminated events:  Reduced events:  Events at  final pressure n/a  Desensitization Mask(s) Used: n/a  Positional therapy with: Other comments: Pt is a 1:1   Patient had caregiver in attendance:  No  Patient watched TV or on phone after lights out for 1.5 hours  Patient slept with positional therapy:  No  Patient slept with head of bed elevated:  No  Patient wore an oral appliance:  No  Patient to bathroom 2 times        POST Test:  Patient was awakened. Electrodes were removed. The patient was discharged after answering the Post Questionnaire. Patient stated that he would go to the cafeteria and wanted to take the bus home as he lives less than 5 miles away. Equipment and room cleaned per infection control policy. Dysuria   WHAT YOU NEED TO KNOW:   Dysuria is difficulty urinating, or pain, burning, or discomfort with urination  Dysuria is usually a symptom of another problem  DISCHARGE INSTRUCTIONS:   Return to the emergency department if:   · You have severe back, side, or abdominal pain  · You have fever and shaking chills  · You vomit several times in a row  Contact your healthcare provider if:   · Your symptoms do not go away, even after treatment  · You have questions or concerns about your condition or care  Medicines:   · Medicines  may be given to help treat a bacterial infection or help decrease bladder spasms  · Take your medicine as directed  Contact your healthcare provider if you think your medicine is not helping or if you have side effects  Tell him of her if you are allergic to any medicine  Keep a list of the medicines, vitamins, and herbs you take  Include the amounts, and when and why you take them  Bring the list or the pill bottles to follow-up visits  Carry your medicine list with you in case of an emergency  Follow up with your healthcare provider as directed: Your healthcare provider may also refer you to a urologist or nephrologist to have additional testing  Write down your questions so you remember to ask them during your visits  Manage your dysuria:   · Drink more liquids  Liquids help flush out bacteria that may be causing an infection  Ask your healthcare provider how much liquid to drink each day and which liquids are best for you  · Take sitz baths as directed  Fill a bathtub with 4 to 6 inches of warm water  You may also use a sitz bath pan that fits over a toilet  Sit in the sitz bath for 20 minutes  Do this 2 to 3 times a day, or as directed  The warm water can help decrease pain and swelling  © 2017 [employer] Information is for End User's use only and may not be sold, redistributed or otherwise used for commercial purposes   All illustrations and images included in CareNotes® are the copyrighted property of A D A M , Inc  or Familia Burris  The above information is an  only  It is not intended as medical advice for individual conditions or treatments  Talk to your doctor, nurse or pharmacist before following any medical regimen to see if it is safe and effective for you  Urinary Retention in Men   WHAT YOU NEED TO KNOW:   Urinary retention is a condition that develops when your bladder does not empty completely when you urinate  DISCHARGE INSTRUCTIONS:   Medicines:   · Medicines  can help decrease the size of your prostate, fight infection, and help you urinate more easily  · Take your medicine as directed  Contact your healthcare provider if you think your medicine is not helping or if you have side effects  Tell him or her if you are allergic to any medicine  Keep a list of the medicines, vitamins, and herbs you take  Include the amounts, and when and why you take them  Bring the list or the pill bottles to follow-up visits  Carry your medicine list with you in case of an emergency  Barajas catheter care: You may need a Barajas catheter for up to 2 weeks at home  Healthcare providers will give you a smaller leg bag to collect urine  Keep the bag below your waist  This will prevent urine from flowing back into your bladder and causing an infection or other problems  Also, keep the tube free of kinks so the urine will drain properly  Do not pull on the catheter  This can cause pain and bleeding, and may cause the catheter to come out  Ask your healthcare provider or urologist for more information on Barajas catheter care  Urinate regularly:  When your catheter is removed, do not let your bladder become too full before you urinate  Set regular times each day to urinate  Urinate as soon as you feel the need or at least every 3 hours while you are awake  Do not drink liquids before you go to bed   Urinate right before you go to bed  Follow up with your healthcare provider or urologist as directed:  Write down your questions so you remember to ask them during your visits  Contact your healthcare provider or urologist if:   · You have a fever  · You have pain when you urinate  · You have blood in your urine  · You have problems with your catheter  · You have questions or concerns about your condition or care  Return to the emergency department if:   · You have severe abdominal pain  · You are breathing faster than usual     · Your heartbeat is faster than usual     · Your face, hands, feet, or ankles are swollen  © 2017 [address]  Information is for End User's use only and may not be sold, redistributed or otherwise used for commercial purposes  All illustrations and images included in CareNotes® are the copyrighted property of A D A M , Inc  or Familia Burris  The above information is an  only  It is not intended as medical advice for individual conditions or treatments  Talk to your doctor, nurse or pharmacist before following any medical regimen to see if it is safe and effective for you

## 2023-02-20 DIAGNOSIS — I82.4Y1 ACUTE DEEP VEIN THROMBOSIS (DVT) OF PROXIMAL VEIN OF RIGHT LOWER EXTREMITY (HCC): ICD-10-CM

## 2023-03-01 ENCOUNTER — TELEPHONE (OUTPATIENT)
Dept: FAMILY MEDICINE CLINIC | Facility: HOSPITAL | Age: 83
End: 2023-03-01

## 2023-03-03 DIAGNOSIS — I82.4Y1 ACUTE DEEP VEIN THROMBOSIS (DVT) OF PROXIMAL VEIN OF RIGHT LOWER EXTREMITY (HCC): ICD-10-CM

## 2023-03-03 DIAGNOSIS — I10 ESSENTIAL HYPERTENSION: Primary | ICD-10-CM

## 2023-03-06 ENCOUNTER — APPOINTMENT (OUTPATIENT)
Dept: LAB | Facility: HOSPITAL | Age: 83
End: 2023-03-06

## 2023-03-06 DIAGNOSIS — I10 ESSENTIAL HYPERTENSION: ICD-10-CM

## 2023-03-06 DIAGNOSIS — I82.4Y1 ACUTE DEEP VEIN THROMBOSIS (DVT) OF PROXIMAL VEIN OF RIGHT LOWER EXTREMITY (HCC): ICD-10-CM

## 2023-03-06 LAB
ALBUMIN SERPL BCP-MCNC: 3.7 G/DL (ref 3.5–5)
ALP SERPL-CCNC: 61 U/L (ref 46–116)
ALT SERPL W P-5'-P-CCNC: 26 U/L (ref 12–78)
ANION GAP SERPL CALCULATED.3IONS-SCNC: 4 MMOL/L (ref 4–13)
AST SERPL W P-5'-P-CCNC: 17 U/L (ref 5–45)
BILIRUB SERPL-MCNC: 1.02 MG/DL (ref 0.2–1)
BUN SERPL-MCNC: 20 MG/DL (ref 5–25)
CALCIUM SERPL-MCNC: 9.3 MG/DL (ref 8.3–10.1)
CHLORIDE SERPL-SCNC: 108 MMOL/L (ref 96–108)
CHOLEST SERPL-MCNC: 203 MG/DL
CO2 SERPL-SCNC: 26 MMOL/L (ref 21–32)
CREAT SERPL-MCNC: 1.06 MG/DL (ref 0.6–1.3)
ERYTHROCYTE [DISTWIDTH] IN BLOOD BY AUTOMATED COUNT: 12.8 % (ref 11.6–15.1)
GFR SERPL CREATININE-BSD FRML MDRD: 65 ML/MIN/1.73SQ M
GLUCOSE P FAST SERPL-MCNC: 97 MG/DL (ref 65–99)
HCT VFR BLD AUTO: 44.5 % (ref 36.5–49.3)
HDLC SERPL-MCNC: 50 MG/DL
HGB BLD-MCNC: 15.3 G/DL (ref 12–17)
LDLC SERPL CALC-MCNC: 136 MG/DL (ref 0–100)
MCH RBC QN AUTO: 32.5 PG (ref 26.8–34.3)
MCHC RBC AUTO-ENTMCNC: 34.4 G/DL (ref 31.4–37.4)
MCV RBC AUTO: 95 FL (ref 82–98)
PLATELET # BLD AUTO: 192 THOUSANDS/UL (ref 149–390)
PMV BLD AUTO: 9.7 FL (ref 8.9–12.7)
POTASSIUM SERPL-SCNC: 4.4 MMOL/L (ref 3.5–5.3)
PROT SERPL-MCNC: 7 G/DL (ref 6.4–8.4)
RBC # BLD AUTO: 4.71 MILLION/UL (ref 3.88–5.62)
SODIUM SERPL-SCNC: 138 MMOL/L (ref 135–147)
TRIGL SERPL-MCNC: 84 MG/DL
WBC # BLD AUTO: 4.84 THOUSAND/UL (ref 4.31–10.16)

## 2023-03-10 ENCOUNTER — OFFICE VISIT (OUTPATIENT)
Dept: FAMILY MEDICINE CLINIC | Facility: HOSPITAL | Age: 83
End: 2023-03-10

## 2023-03-10 VITALS
HEART RATE: 55 BPM | SYSTOLIC BLOOD PRESSURE: 130 MMHG | HEIGHT: 68 IN | WEIGHT: 263 LBS | DIASTOLIC BLOOD PRESSURE: 78 MMHG | TEMPERATURE: 97.6 F | BODY MASS INDEX: 39.86 KG/M2

## 2023-03-10 DIAGNOSIS — I10 ESSENTIAL HYPERTENSION: Primary | ICD-10-CM

## 2023-03-10 DIAGNOSIS — R68.2 DRY MOUTH: ICD-10-CM

## 2023-03-10 DIAGNOSIS — I82.4Y1 DEEP VEIN THROMBOSIS (DVT) OF PROXIMAL VEIN OF RIGHT LOWER EXTREMITY, UNSPECIFIED CHRONICITY (HCC): ICD-10-CM

## 2023-08-15 DIAGNOSIS — I82.4Y1 ACUTE DEEP VEIN THROMBOSIS (DVT) OF PROXIMAL VEIN OF RIGHT LOWER EXTREMITY (HCC): ICD-10-CM

## 2023-08-15 RX ORDER — RIVAROXABAN 20 MG/1
TABLET, FILM COATED ORAL
Qty: 90 TABLET | Refills: 1 | Status: SHIPPED | OUTPATIENT
Start: 2023-08-15

## 2023-08-28 ENCOUNTER — HOSPITAL ENCOUNTER (OUTPATIENT)
Dept: RADIOLOGY | Facility: HOSPITAL | Age: 83
Discharge: HOME | End: 2023-08-28
Payer: COMMERCIAL

## 2023-08-28 ENCOUNTER — OFFICE VISIT (OUTPATIENT)
Dept: FAMILY MEDICINE CLINIC | Facility: HOSPITAL | Age: 83
End: 2023-08-28
Payer: COMMERCIAL

## 2023-08-28 VITALS
TEMPERATURE: 98.7 F | BODY MASS INDEX: 40.07 KG/M2 | WEIGHT: 264.4 LBS | HEART RATE: 65 BPM | SYSTOLIC BLOOD PRESSURE: 134 MMHG | HEIGHT: 68 IN | DIASTOLIC BLOOD PRESSURE: 82 MMHG

## 2023-08-28 DIAGNOSIS — M54.50 CHRONIC BILATERAL LOW BACK PAIN WITHOUT SCIATICA: Primary | ICD-10-CM

## 2023-08-28 DIAGNOSIS — M47.816 LUMBAR SPONDYLOSIS: ICD-10-CM

## 2023-08-28 DIAGNOSIS — C61 PROSTATE CANCER (HCC): ICD-10-CM

## 2023-08-28 DIAGNOSIS — M54.50 CHRONIC BILATERAL LOW BACK PAIN WITHOUT SCIATICA: ICD-10-CM

## 2023-08-28 DIAGNOSIS — M51.26 PROTRUSION OF LUMBAR INTERVERTEBRAL DISC: ICD-10-CM

## 2023-08-28 DIAGNOSIS — E66.01 OBESITY, MORBID (HCC): ICD-10-CM

## 2023-08-28 DIAGNOSIS — G89.29 CHRONIC BILATERAL LOW BACK PAIN WITHOUT SCIATICA: ICD-10-CM

## 2023-08-28 DIAGNOSIS — G89.29 CHRONIC BILATERAL LOW BACK PAIN WITHOUT SCIATICA: Primary | ICD-10-CM

## 2023-08-28 PROCEDURE — 72110 X-RAY EXAM L-2 SPINE 4/>VWS: CPT

## 2023-08-28 PROCEDURE — 99214 OFFICE O/P EST MOD 30 MIN: CPT | Performed by: NURSE PRACTITIONER

## 2023-08-28 NOTE — PROGRESS NOTES
Name: Julio Ascencio      : 1940      MRN: [id number]  Encounter Provider: DAREN Mendez  Encounter Date: 2023   Encounter department: [address]     1. Chronic bilateral low back pain without sciatica  Comments:  known lumbar disc disease - will re-eval w/x-ray and reconsult pain management for further evaluation, offered PT rx but he declines  Orders:  -     Ambulatory referral to Spine & Pain Management; Future  -     XR spine lumbar minimum 4 views non injury; Future; Expected date: 2023    2. Lumbar spondylosis  -     Ambulatory referral to Spine & Pain Management; Future  -     XR spine lumbar minimum 4 views non injury; Future; Expected date: 2023    3. Protrusion of lumbar intervertebral disc  -     Ambulatory referral to Spine & Pain Management; Future  -     XR spine lumbar minimum 4 views non injury; Future; Expected date: 2023    4. Obesity, morbid ([address])  Assessment & Plan:  BMI stable - weight loss encouraged      5. Prostate cancer [facility])  Assessment & Plan:  Established w/urology           Subjective      Has occ back aches that typically resolve in a couple weeks. Has pain currently for about 2 weeks and was worse this morning. No injury or trigger known except has been worse since mowing grass. Taking tylenol ES as needed, avoids other meds with being on xarelto. Hx of seeing back specialist at [address] and told something was wrong with L4 but don't worry about it. Hx of RENETTA x1 w/pain management. [facility]. Review of Systems   Musculoskeletal: Positive for back pain.    Neurological:        Chronic left lateral thigh burning for years       Current Outpatient Medications on File Prior to Visit   Medication Sig   • lisinopril (ZESTRIL) 5 mg tablet TAKE ONE BY MOUTH DAILY   • Xarelto 20 MG tablet TAKE ONE TABLET (20 MG TOTAL) BY MOUTH DAILY WITH BREAKFAST Objective     /82   Pulse 65   Temp 98.7 °F (37.1 °C)   Ht 5' 8" (1.727 m)   Wt 120 kg (264 lb 6.4 oz)   BMI 40.20 kg/m²       Physical Exam  Vitals reviewed. Constitutional:       General: He is not in acute distress. Appearance: Normal appearance. HENT:      Head: Normocephalic. Eyes:      General: No scleral icterus. Pulmonary:      Effort: Pulmonary effort is normal. No respiratory distress. Musculoskeletal:      Lumbar back: Tenderness present. No deformity or spasms. Decreased range of motion. No scoliosis. Back:    Skin:     General: Skin is warm and dry. Neurological:      General: No focal deficit present. Mental Status: He is alert and oriented to person, place, and time.    Psychiatric:         Mood and Affect: Mood normal.         Behavior: Behavior normal.          DAREN Johnson

## 2023-09-13 ENCOUNTER — OFFICE VISIT (OUTPATIENT)
Dept: PAIN MEDICINE | Facility: CLINIC | Age: 83
End: 2023-09-13
Payer: COMMERCIAL

## 2023-09-13 VITALS
HEIGHT: 68 IN | TEMPERATURE: 97.8 F | HEART RATE: 58 BPM | WEIGHT: 264 LBS | DIASTOLIC BLOOD PRESSURE: 78 MMHG | SYSTOLIC BLOOD PRESSURE: 118 MMHG | BODY MASS INDEX: 40.01 KG/M2

## 2023-09-13 DIAGNOSIS — I82.4Y1 DEEP VEIN THROMBOSIS (DVT) OF PROXIMAL VEIN OF RIGHT LOWER EXTREMITY, UNSPECIFIED CHRONICITY (HCC): ICD-10-CM

## 2023-09-13 DIAGNOSIS — E66.01 OBESITY, MORBID (HCC): ICD-10-CM

## 2023-09-13 DIAGNOSIS — M54.16 LEFT LUMBAR RADICULOPATHY: Primary | ICD-10-CM

## 2023-09-13 DIAGNOSIS — Z79.01 CHRONIC ANTICOAGULATION: ICD-10-CM

## 2023-09-13 PROCEDURE — 99215 OFFICE O/P EST HI 40 MIN: CPT | Performed by: ANESTHESIOLOGY

## 2023-09-13 RX ORDER — PREDNISONE 10 MG/1
TABLET ORAL
Qty: 36 TABLET | Refills: 0 | Status: SHIPPED | OUTPATIENT
Start: 2023-09-13

## 2023-09-13 NOTE — PROGRESS NOTES
Assessment  1. Left lumbar radiculopathy    2. Chronic anticoagulation    3. Obesity, morbid ([address])    4. Deep vein thrombosis (DVT) of proximal vein of right lower extremity, unspecified chronicity ([address])        Plan      At this point the patient's pain persists despite time, relative rest, activity modification, and nonsteroidal anti-inflammatories. His pain is significantly interfering with his daily living activities. He has undergone physical therapy and has slight neurological deficit. It is appropriate to order an MRI of the lumbar spine to rule out any significant etiology of his symptoms. I will start him on a titrating dose of oral prednisone to address any inflammatory component of the patient's pain. He understands he should not take nonsteroidal anti-inflammatories until he is finished with this steroid. He understands there is a chance of decreased immunity secondary to steroids. If he has any problems or questions he will give us a call. Patient is to contact our office or present to hospital Emergency Room if experience worsening or new low back/lower extremity pain, sensory or motor change, bladder or bowel dysfunction, or other neurological change. Once we obtain MRI results, I will follow-up with the patient, review the results and current symptoms, and discuss the next steps of the treatment plan. My impressions and treatment recommendations were discussed in detail with the patient who verbalized understanding and had no further questions. Discharge instructions were provided. I personally saw and examined the patient and I agree with the above discussed plan of care. This note is created using dictation transcription. It may contain typographical errors, grammatical errors, improperly dictated words, background noise and other errors.     Orders Placed This Encounter   Procedures   • MRI lumbar spine without contrast     Standing Status:   Future     Standing Expiration Date: 9/13/2027     Scheduling Instructions: There is no preparation for this test. Please leave your jewelry and valuables at home, wedding rings are the exception. All patients will be required to change into a hospital gown and pants. Street clothes are not permitted in the MRI. Magnetic nail polish must be removed prior to arrival for your test. Please bring your insurance cards, a form of photo ID and a list of your medications with you. Arrive 15 minutes prior to your appointment time in order to register. Please bring any prior CT or MRI studies of this area that were not performed at a [facility]. To schedule this appointment, please contact Central Scheduling at [phone]. Prior to your appointment, please make sure you complete the MRI Screening Form when you e-Check in for your appointment. This will be available starting 7 days before your appointment in [address] [place] [address]. You may receive an e-mail with an activation code if you do not have a [employer] account. If you do not have access to a device, we will complete your screening at your appointment. Order Specific Question:   What is the patient's sedation requirement? If Medication for Claustrophobia is selected, order medication at this point. Answer:   No Sedation     Order Specific Question:   Does this procedure require the 3T MRI at [facility] or [place]?     Answer:   No     Order Specific Question:   Release to patient through [employer]     Answer:   Immediate     Order Specific Question:   Is order priority selected as STAT?      Answer:   No     Order Specific Question:   Reason for Exam (FREE TEXT)     Answer:   left lumbar radic   • Ambulatory referral to Physical Therapy     Standing Status:   Future     Standing Expiration Date:   9/13/2024     Referral Priority:   Routine     Referral Type:   Physical Therapy     Referral Reason:   Specialty Services Required     Requested Specialty:   Physical Therapy     Number of Visits Requested:   1     Expiration Date:   9/13/2024     New Medications Ordered This Visit   Medications   • predniSONE 10 mg tablet     Sig: Take according to titration schedule     Dispense:  36 tablet     Refill:  0       History of Present Illness    Ioana Bass is a 82 y.o. male with last seen in 2021. He presents with worsening low back and left lower extremity pain numbness and weakness. Is unaware of any recent clear precipitant event denies any trauma or injury. Pain is worse in the morning described intermittent burning. Standing walking aggravates the symptoms while rest relaxation somewhat reduces his pain. He has undergone physical therapy which aggravated his pain. I have personally reviewed and/or updated the patient's past medical history, past surgical history, family history, social history, current medications, allergies, and vital signs today. Review of Systems   Constitutional: Negative for fever and unexpected weight change. HENT: Negative for trouble swallowing. Eyes: Negative for visual disturbance. Respiratory: Negative for shortness of breath and wheezing. Cardiovascular: Negative for chest pain and palpitations. Gastrointestinal: Negative for constipation, diarrhea, nausea and vomiting. Endocrine: Negative for cold intolerance, heat intolerance and polydipsia. Genitourinary: Negative for difficulty urinating and frequency. Musculoskeletal: Positive for gait problem. Negative for arthralgias, joint swelling and myalgias. Skin: Negative for rash. Neurological: Negative for dizziness, seizures, syncope, weakness and headaches. Hematological: Does not bruise/bleed easily. Psychiatric/Behavioral: Negative for dysphoric mood. All other systems reviewed and are negative.       Patient Active Problem List   Diagnosis   • Essential hypertension   • Personal history of prostate cancer   • AKIRA (stress urinary incontinence), male   • Bladder neck contracture   • Bilateral carpal tunnel syndrome   • Obesity, morbid (HCC)   • Protrusion of lumbar intervertebral disc   • Lumbar radiculopathy   • Deep vein thrombosis (DVT) of proximal lower extremity (HCC)   • Chronic pain syndrome   • Lumbar spondylosis   • Prostate cancer [facility])   • Chronic anticoagulation       Past Medical History:   Diagnosis Date   • Arthritis    • Bladder neck contracture    • Cancer [facility])    • ED (erectile dysfunction)    • Frequency of urination    • Hypercholesterolemia    • Hypertension     19   • Nocturia    • Prostate cancer ([address] [facility])     18   • Stress incontinence        Past Surgical History:   Procedure Laterality Date   • BLADDER SURGERY      Bladder Neck Restriction, SP Catheter   • CARPAL TUNNEL RELEASE     • CHOLECYSTECTOMY     • CYSTOSTOMY W/ BLADDER DILATION     • PROSTATECTOMY      Radical   • URETHRAL SLING         Family History   Problem Relation Age of Onset   • Coronary artery disease Father         involving native coronary artery of native heart, angina presence unspecified       Social History     Occupational History   • Not on file   Tobacco Use   • Smoking status: Former     Packs/day: 1.50     Years: 12.00     Total pack years: 18.00     Types: Cigarettes     Start date:      Quit date: 0     Years since quittin.7   • Smokeless tobacco: Never   Vaping Use   • Vaping Use: Never used   Substance and Sexual Activity   • Alcohol use: No   • Drug use: No   • Sexual activity: Not on file       Current Outpatient Medications on File Prior to Visit   Medication Sig   • lisinopril (ZESTRIL) 5 mg tablet TAKE ONE BY MOUTH DAILY   • Xarelto 20 MG tablet TAKE ONE TABLET (20 MG TOTAL) BY MOUTH DAILY WITH BREAKFAST     No current facility-administered medications on file prior to visit.        No Known Allergies    Physical Exam    /78 (BP Location: Left arm, Patient Position: Sitting, Cuff Size: Standard)   Pulse 58   Temp 97.8 °F (36.6 °C)   Ht 5' 8" (1.727 m)   Wt 120 kg (264 lb)   BMI 40.14 kg/m²     Constitutional: normal, well developed, well nourished, alert, in no distress and non-toxic and no overt pain behavior. and overweight  Eyes: anicteric  HEENT: grossly intact  Neck: supple, symmetric, trachea midline and no masses   Pulmonary:even and unlabored  Cardiovascular:No edema or pitting edema present  Skin:Normal without rashes or lesions and well hydrated  Psychiatric:Mood and affect appropriate  Neurologic:Cranial Nerves II-XII grossly intact  Musculoskeletal:normal and antalgic, difficulty going from sitting to standing sitting position; no obvious skin lesions or erythema lumbar sacral spine; mild tenderness in lumbar paravertebrals, no sacroiliac or greater trochanteric tenderness bilateral; deep tendon reflexes are diminished but symmetrical bilateral patellar and achilles; 3-4/5 strength left knee extension; decree sensation left L3/L4 distribution of pinwheel; positive left straight leg raising. Imaging  LUMBAR SPINE @  8-28-23     INDICATION:   M54.50: Low back pain, unspecified  G89.29: Other chronic pain  M47.816: Spondylosis without myelopathy or radiculopathy, lumbar region  M51.26: Other intervertebral disc displacement, lumbar region.     COMPARISON: Plain films of the lumbar spine September 2015     VIEWS:  XR SPINE LUMBAR MINIMUM 4 VIEWS NON INJURY        FINDINGS:  Study is limited by body habitus with underpenetration. There are 5 non rib bearing lumbar vertebral bodies.     There is no evidence of acute fracture or destructive osseous lesion.     Alignment is unremarkable.     Osteophyte formation at multiple levels of the lumbar spine with mild disc base narrowing at L1-2.     The pedicles appear intact.     Surgical clips are seen in the right upper quadrant, with surgical clips in the pelvis pelvis, status post radical prostatectomy.     IMPRESSION:     Lumbar spondylosis. Very limited by body habitus.     MRI Lumbar Spine @ [facility] 8-7-20  IMPRESSION:     Multilevel degenerative changes as detailed above. Canal and left lateral recess   stenosis are greatest at L3-L4. There is no high-grade left foraminal stenosis. Small indeterminate lesion along the inferior endplate of L2. I have personally reviewed pertinent films in PACS and my interpretation is Left lateral recess stenosis with spondylosis.

## 2023-09-15 ENCOUNTER — OFFICE VISIT (OUTPATIENT)
Dept: FAMILY MEDICINE CLINIC | Facility: HOSPITAL | Age: 83
End: 2023-09-15
Payer: COMMERCIAL

## 2023-09-15 VITALS
HEIGHT: 68 IN | WEIGHT: 263 LBS | TEMPERATURE: 97.7 F | HEART RATE: 61 BPM | BODY MASS INDEX: 39.86 KG/M2 | SYSTOLIC BLOOD PRESSURE: 128 MMHG | DIASTOLIC BLOOD PRESSURE: 64 MMHG

## 2023-09-15 DIAGNOSIS — Z00.00 MEDICARE ANNUAL WELLNESS VISIT, SUBSEQUENT: Primary | ICD-10-CM

## 2023-09-15 DIAGNOSIS — Z79.01 CHRONIC ANTICOAGULATION: ICD-10-CM

## 2023-09-15 DIAGNOSIS — I82.4Y1 DEEP VEIN THROMBOSIS (DVT) OF PROXIMAL VEIN OF RIGHT LOWER EXTREMITY, UNSPECIFIED CHRONICITY (HCC): ICD-10-CM

## 2023-09-15 DIAGNOSIS — I10 ESSENTIAL HYPERTENSION: ICD-10-CM

## 2023-09-15 PROCEDURE — 99214 OFFICE O/P EST MOD 30 MIN: CPT | Performed by: FAMILY MEDICINE

## 2023-09-15 PROCEDURE — G0439 PPPS, SUBSEQ VISIT: HCPCS | Performed by: FAMILY MEDICINE

## 2023-09-15 NOTE — PROGRESS NOTES
Assessment and Plan:     Problem List Items Addressed This Visit        Cardiovascular and Mediastinum    Deep vein thrombosis (DVT) of proximal lower extremity ([address])    Essential hypertension    Relevant Orders    CBC    Comprehensive metabolic panel    Lipid Panel with Direct LDL reflex    TSH, 3rd generation with Free T4 reflex       Other    Chronic anticoagulation   Other Visit Diagnoses     Medicare annual wellness visit, subsequent    -  Primary      htn. Stable. Continue with the same. Dvt. Continue with xarelto. Low back pain. Improved with use of prednisone through Pain [facility]. Update labs prior to next visit. Preventive health issues were discussed with patient, and age appropriate screening tests were ordered as noted in patient's After Visit Summary. Personalized health advice and appropriate referrals for health education or preventive services given if needed, as noted in patient's After Visit Summary. History of Present Illness:     Patient presents for a Medicare Wellness Visit    Chikis Cervantes is here for awv and fu of htn and dvt. No new concerns. Doing well with medications. currenlty on prednisone from pain mgt due to low back pain. Eating well. Continues to remain active. Patient Care Team:  Valdemar Leroy MD as PCP - General (Family Medicine)  Josh Mas MD as PCP - [address] [place] [address] ([facility])  Nery Garza MD     Review of Systems:     Review of Systems   Constitutional: Negative. Negative for activity change, appetite change, chills and diaphoresis. HENT: Negative for congestion and dental problem. Respiratory: Negative. Negative for apnea, chest tightness, shortness of breath and wheezing. Cardiovascular: Negative. Negative for chest pain, palpitations and leg swelling. Gastrointestinal: Negative. Negative for abdominal distention, abdominal pain, constipation, diarrhea and nausea. Genitourinary: Negative.   Negative for difficulty urinating, dysuria and frequency.         Problem List:     Patient Active Problem List   Diagnosis   • Essential hypertension   • Personal history of prostate cancer   • AKIRA (stress urinary incontinence), male   • Bladder neck contracture   • Bilateral carpal tunnel syndrome   • Obesity, morbid (HCC)   • Protrusion of lumbar intervertebral disc   • Lumbar radiculopathy   • Deep vein thrombosis (DVT) of proximal lower extremity ([facility])   • Chronic pain syndrome   • Lumbar spondylosis   • Prostate cancer [facility])   • Chronic anticoagulation      Past Medical and Surgical History:     Past Medical History:   Diagnosis Date   • Arthritis    • Bladder neck contracture    • Cancer [facility])    • ED (erectile dysfunction)    • Frequency of urination    • Hypercholesterolemia    • Hypertension     19   • Nocturia    • Prostate cancer ([address])     18   • Stress incontinence      Past Surgical History:   Procedure Laterality Date   • BLADDER SURGERY      Bladder Neck Restriction, SP Catheter   • CARPAL TUNNEL RELEASE     • CHOLECYSTECTOMY     • CYSTOSTOMY W/ BLADDER DILATION     • PROSTATECTOMY      Radical   • URETHRAL SLING        Family History:     Family History   Problem Relation Age of Onset   • Coronary artery disease Father         involving native coronary artery of native heart, angina presence unspecified      Social History:     Social History     Socioeconomic History   • Marital status: /Civil Union     Spouse name: None   • Number of children: None   • Years of education: None   • Highest education level: None   Occupational History   • None   Tobacco Use   • Smoking status: Former     Packs/day: 1.50     Years: 12.00     Total pack years: 18.00     Types: Cigarettes     Start date:      Quit date:      Years since quittin.7   • Smokeless tobacco: Never   Vaping Use   • Vaping Use: Never used   Substance and Sexual Activity   • Alcohol use: No   • Drug use: No   • Sexual activity: None   Other Topics Concern   • None   Social History Narrative   • None     Social Determinants of Health     Financial Resource Strain: Low Risk  (9/15/2023)    Overall Financial Resource Strain (CARDIA)    • Difficulty of Paying Living Expenses: Not hard at all   Food Insecurity: Not on file   Transportation Needs: No Transportation Needs (9/15/2023)    PRAPARE - Transportation    • Lack of Transportation (Medical): No    • Lack of Transportation (Non-Medical): No   Physical Activity: Not on file   Stress: Not on file   Social Connections: Not on file   Intimate Partner Violence: Not on file   Housing Stability: Not on file      Medications and Allergies:     Current Outpatient Medications   Medication Sig Dispense Refill   • lisinopril (ZESTRIL) 5 mg tablet TAKE ONE BY MOUTH DAILY 90 tablet 3   • predniSONE 10 mg tablet Take according to titration schedule 36 tablet 0   • Xarelto 20 MG tablet TAKE ONE TABLET (20 MG TOTAL) BY MOUTH DAILY WITH BREAKFAST 90 tablet 1     No current facility-administered medications for this visit. No Known Allergies   Immunizations:     Immunization History   Administered Date(s) Administered   • COVID-19 MODERNA VACC 0.5 ML IM 01/19/2021, 02/17/2021, 12/07/2021   • Influenza, high dose seasonal 0.7 mL 11/30/2020, 12/22/2021   • Pneumococcal Conjugate 13-Valent 12/13/2017   • Pneumococcal Polysaccharide PPV23 05/17/2007      Health Maintenance: There are no preventive care reminders to display for this patient. Topic Date Due   • COVID-19 Vaccine (4 - Moderna series) 02/01/2022   • Influenza Vaccine (1) 09/01/2023      Medicare Screening Tests and Risk Assessments:     Wilfredo Braden is here for his Subsequent Wellness visit. Health Risk Assessment:   Patient rates overall health as good. Patient feels that their physical health rating is same. Patient is satisfied with their life. Eyesight was rated as slightly worse. Hearing was rated as same.  Patient feels that their emotional and mental health rating is same. Patients states they are often angry. Patient states they are never, rarely unusually tired/fatigued. Pain experienced in the last 7 days has been some. Patient's pain rating has been 2/10. Patient states that he has experienced no weight loss or gain in last 6 months. Depression Screening:   PHQ-2 Score: 0      Fall Risk Screening: In the past year, patient has experienced: no history of falling in past year      Home Safety:  Patient does not have trouble with stairs inside or outside of their home. Patient has working smoke alarms and has working carbon monoxide detector. Home safety hazards include: none. Nutrition:   Current diet is Regular. Medications:   Patient is not currently taking any over-the-counter supplements. Patient is able to manage medications. Activities of Daily Living (ADLs)/Instrumental Activities of Daily Living (IADLs):   Walk and transfer into and out of bed and chair?: Yes  Dress and groom yourself?: Yes    Bathe or shower yourself?: Yes    Feed yourself? Yes  Do your laundry/housekeeping?: No  Manage your money, pay your bills and track your expenses?: Yes  Make your own meals?: No    Do your own shopping?: Yes    Previous Hospitalizations:   Any hospitalizations or ED visits within the last 12 months?: No      Advance Care Planning:   Living will: Yes    Durable POA for healthcare:  Yes    Advanced directive: Yes    Advanced directive counseling given: Yes      Cognitive Screening:   Provider or family/friend/caregiver concerned regarding cognition?: No    PREVENTIVE SCREENINGS      Cardiovascular Screening:    General: Screening Current      Diabetes Screening:     General: Screening Current      Colorectal Cancer Screening:     General: Screening Not Indicated      Prostate Cancer Screening:    General: History Prostate Cancer and Screening Not Indicated      Osteoporosis Screening:    General: Screening Not Indicated      Abdominal Aortic Aneurysm (AAA) Screening:    Risk factors include: tobacco use        Lung Cancer Screening:     General: Screening Not Indicated      Hepatitis C Screening:    General: Screening Not Indicated    Screening, Brief Intervention, and Referral to Treatment (SBIRT)    Screening  Typical number of drinks in a day: 0  Typical number of drinks in a week: 0  Interpretation: Low risk drinking behavior. AUDIT-C Screenin) How often did you have a drink containing alcohol in the past year? never  2) How many drinks did you have on a typical day when you were drinking in the past year? 0  3) How often did you have 6 or more drinks on one occasion in the past year? never    AUDIT-C Score: 0  Interpretation: Score 0-3 (male): Negative screen for alcohol misuse    Single Item Drug Screening:  How often have you used an illegal drug (including marijuana) or a prescription medication for non-medical reasons in the past year? never    Single Item Drug Screen Score: 0  Interpretation: Negative screen for possible drug use disorder    Review of Current Opioid Use  Opioid Risk Tool (ORT) Score: 0    Vision Screening    Right eye Left eye Both eyes   Without correction      With correction 20/40 20/40 20/30        Physical Exam:     /64   Pulse 61   Temp 97.7 °F (36.5 °C)   Ht 5' 8" (1.727 m)   Wt 119 kg (263 lb)   BMI 39.99 kg/m²     Physical Exam  Vitals and nursing note reviewed. Constitutional:       General: He is not in acute distress. Appearance: Normal appearance. He is well-developed. He is obese. HENT:      Head: Normocephalic and atraumatic. Nose: Nose normal.      Mouth/Throat:      Mouth: Mucous membranes are moist.   Eyes:      Conjunctiva/sclera: Conjunctivae normal.   Cardiovascular:      Rate and Rhythm: Normal rate and regular rhythm. Heart sounds: No murmur heard. Pulmonary:      Effort: Pulmonary effort is normal. No respiratory distress. Breath sounds: Normal breath sounds. Abdominal:      General: Bowel sounds are normal.      Palpations: Abdomen is soft. Tenderness: There is no abdominal tenderness. Musculoskeletal:         General: No swelling. Cervical back: Neck supple. Skin:     General: Skin is warm and dry. Capillary Refill: Capillary refill takes less than 2 seconds. Neurological:      General: No focal deficit present. Mental Status: He is alert and oriented to person, place, and time.    Psychiatric:         Mood and Affect: Mood normal.          Darrin Schlatter, MD

## 2023-09-15 NOTE — PATIENT INSTRUCTIONS
Medicare Preventive Visit Patient Instructions  Thank you for completing your Welcome to Medicare Visit or Medicare Annual Wellness Visit today. Your next wellness visit will be due in one year (9/15/2024). The screening/preventive services that you may require over the next 5-10 years are detailed below. Some tests may not apply to you based off risk factors and/or age. Screening tests ordered at today's visit but not completed yet may show as past due. Also, please note that scanned in results may not display below. Preventive Screenings:  Service Recommendations Previous Testing/Comments   Colorectal Cancer Screening  · Colonoscopy    · Fecal Occult Blood Test (FOBT)/Fecal Immunochemical Test (FIT)  · Fecal DNA/Cologuard Test  · Flexible Sigmoidoscopy Age: 43-73 years old   Colonoscopy: every 10 years (May be performed more frequently if at higher risk)  OR  FOBT/FIT: every 1 year  OR  Cologuard: every 3 years  OR  Sigmoidoscopy: every 5 years  Screening may be recommended earlier than age 39 if at higher risk for colorectal cancer. Also, an individualized decision between you and your healthcare provider will decide whether screening between the ages of 77-80 would be appropriate.  Colonoscopy: Not on file  FOBT/FIT: Not on file  Cologuard: Not on file  Sigmoidoscopy: Not on file          Prostate Cancer Screening Individualized decision between patient and health care provider in men between ages of 53-66   Medicare will cover every 12 months beginning on the day after your 50th birthday PSA: No results in last 5 years     History Prostate Cancer  Screening Not Indicated     Hepatitis C Screening Once for adults born between 1945 and 1965  More frequently in patients at high risk for Hepatitis C Hep C Antibody: Not on file        Diabetes Screening 1-2 times per year if you're at risk for diabetes or have pre-diabetes Fasting glucose: 97 mg/dL (3/6/2023)  A1C: No results in last 5 years (No results in last 5 years)  Screening Current   Cholesterol Screening Once every 5 years if you don't have a lipid disorder. May order more often based on risk factors. Lipid panel: 03/06/2023  Screening Current      Other Preventive Screenings Covered by Medicare:  1. Abdominal Aortic Aneurysm (AAA) Screening: covered once if your at risk. You're considered to be at risk if you have a family history of AAA or a male between the age of 70-76 who smoking at least 100 cigarettes in your lifetime. 2. Lung Cancer Screening: covers low dose CT scan once per year if you meet all of the following conditions: (1) Age 48-67; (2) No signs or symptoms of lung cancer; (3) Current smoker or have quit smoking within the last 15 years; (4) You have a tobacco smoking history of at least 20 pack years (packs per day x number of years you smoked); (5) You get a written order from a healthcare provider. 3. Glaucoma Screening: covered annually if you're considered high risk: (1) You have diabetes OR (2) Family history of glaucoma OR (3)  aged 48 and older OR (3)  American aged 72 and older  3. Osteoporosis Screening: covered every 2 years if you meet one of the following conditions: (1) Have a vertebral abnormality; (2) On glucocorticoid therapy for more than 3 months; (3) Have primary hyperparathyroidism; (4) On osteoporosis medications and need to assess response to drug therapy. 5. HIV Screening: covered annually if you're between the age of 14-79. Also covered annually if you are younger than 13 and older than 72 with risk factors for HIV infection. For pregnant patients, it is covered up to 3 times per pregnancy.     Immunizations:  Immunization Recommendations   Influenza Vaccine Annual influenza vaccination during flu season is recommended for all persons aged >= 6 months who do not have contraindications   Pneumococcal Vaccine   * Pneumococcal conjugate vaccine = PCV13 (Prevnar 13), PCV15 (Vaxneuvance), PCV20 (Prevnar 20)  * Pneumococcal polysaccharide vaccine = PPSV23 (Pneumovax) Adults 2364 years old: 1-3 doses may be recommended based on certain risk factors  Adults 72 years old: 1-2 doses may be recommended based off what pneumonia vaccine you previously received   Hepatitis B Vaccine 3 dose series if at intermediate or high risk (ex: diabetes, end stage renal disease, liver disease)   Tetanus (Td) Vaccine - COST NOT COVERED BY MEDICARE PART B Following completion of primary series, a booster dose should be given every 10 years to maintain immunity against tetanus. Td may also be given as tetanus wound prophylaxis. Tdap Vaccine - COST NOT COVERED BY MEDICARE PART B Recommended at least once for all adults. For pregnant patients, recommended with each pregnancy. Shingles Vaccine (Shingrix) - COST NOT COVERED BY MEDICARE PART B  2 shot series recommended in those aged 48 and above     Health Maintenance Due:  There are no preventive care reminders to display for this patient. Immunizations Due:      Topic Date Due   • COVID-19 Vaccine (4 - Moderna series) 02/01/2022   • Influenza Vaccine (1) 09/01/2023     Advance Directives   What are advance directives? Advance directives are legal documents that state your wishes and plans for medical care. These plans are made ahead of time in case you lose your ability to make decisions for yourself. Advance directives can apply to any medical decision, such as the treatments you want, and if you want to donate organs. What are the types of advance directives? There are many types of advance directives, and each state has rules about how to use them. You may choose a combination of any of the following:  · Living will: This is a written record of the treatment you want. You can also choose which treatments you do not want, which to limit, and which to stop at a certain time. This includes surgery, medicine, IV fluid, and tube feedings.    · Durable power of  for [facility]): This is a written record that states who you want to make healthcare choices for you when you are unable to make them for yourself. This person, called a proxy, is usually a family member or a friend. You may choose more than 1 proxy. · Do not resuscitate (DNR) order:  A DNR order is used in case your heart stops beating or you stop breathing. It is a request not to have certain forms of treatment, such as CPR. A DNR order may be included in other types of advance directives. · Medical directive: This covers the care that you want if you are in a coma, near death, or unable to make decisions for yourself. You can list the treatments you want for each condition. Treatment may include pain medicine, surgery, blood transfusions, dialysis, IV or tube feedings, and a ventilator (breathing machine). · Values history: This document has questions about your views, beliefs, and how you feel and think about life. This information can help others choose the care that you would choose. Why are advance directives important? An advance directive helps you control your care. Although spoken wishes may be used, it is better to have your wishes written down. Spoken wishes can be misunderstood, or not followed. Treatments may be given even if you do not want them. An advance directive may make it easier for your family to make difficult choices about your care. Weight Management   Why it is important to manage your weight:  Being overweight increases your risk of health conditions such as heart disease, high blood pressure, type 2 diabetes, and certain types of cancer. It can also increase your risk for osteoarthritis, sleep apnea, and other respiratory problems. Aim for a slow, steady weight loss. Even a small amount of weight loss can lower your risk of health problems. How to lose weight safely:  A safe and healthy way to lose weight is to eat fewer calories and get regular exercise.  You can lose up about 1 pound a week by decreasing the number of calories you eat by 500 calories each day. Healthy meal plan for weight management:  A healthy meal plan includes a variety of foods, contains fewer calories, and helps you stay healthy. A healthy meal plan includes the following:  · Eat whole-grain foods more often. A healthy meal plan should contain fiber. Fiber is the part of grains, fruits, and vegetables that is not broken down by your body. Whole-grain foods are healthy and provide extra fiber in your diet. Some examples of whole-grain foods are whole-wheat breads and pastas, oatmeal, brown rice, and bulgur. · Eat a variety of vegetables every day. Include dark, leafy greens such as spinach, kale, sapna greens, and mustard greens. Eat yellow and orange vegetables such as carrots, sweet potatoes, and winter squash. · Eat a variety of fruits every day. Choose fresh or canned fruit (canned in its own juice or light syrup) instead of juice. Fruit juice has very little or no fiber. · Eat low-fat dairy foods. Drink fat-free (skim) milk or 1% milk. Eat fat-free yogurt and low-fat cottage cheese. Try low-fat cheeses such as mozzarella and other reduced-fat cheeses. · Choose meat and other protein foods that are low in fat. Choose beans or other legumes such as split peas or lentils. Choose fish, skinless poultry (chicken or turkey), or lean cuts of red meat (beef or pork). Before you cook meat or poultry, cut off any visible fat. · Use less fat and oil. Try baking foods instead of frying them. Add less fat, such as margarine, sour cream, regular salad dressing and mayonnaise to foods. Eat fewer high-fat foods. Some examples of high-fat foods include french fries, doughnuts, ice cream, and cakes. · Eat fewer sweets. Limit foods and drinks that are high in sugar. This includes candy, cookies, regular soda, and sweetened drinks. Exercise:  Exercise at least 30 minutes per day on most days of the week. Some examples of exercise include walking, biking, dancing, and swimming. You can also fit in more physical activity by taking the stairs instead of the elevator or parking farther away from stores. Ask your healthcare provider about the best exercise plan for you. © Copyright [employer] 2018 Information is for End User's use only and may not be sold, redistributed or otherwise used for commercial purposes.  All illustrations and images included in CareNotes® are the copyrighted property of [employer] Inc[employer] or [address] [place] [address]

## 2023-09-26 ENCOUNTER — EVALUATION (OUTPATIENT)
Dept: PHYSICAL THERAPY | Facility: CLINIC | Age: 83
End: 2023-09-26
Payer: COMMERCIAL

## 2023-09-26 DIAGNOSIS — M54.16 LEFT LUMBAR RADICULOPATHY: Primary | ICD-10-CM

## 2023-09-26 PROCEDURE — 97161 PT EVAL LOW COMPLEX 20 MIN: CPT | Performed by: PHYSICAL THERAPIST

## 2023-09-26 PROCEDURE — 97110 THERAPEUTIC EXERCISES: CPT | Performed by: PHYSICAL THERAPIST

## 2023-09-26 NOTE — PROGRESS NOTES
PT Evaluation     Today's date: 2023  Patient name: Agustina Lacey  : 1940  MRN: [id number]  Referring provider: DO Gunjan Goldberg:   Encounter Diagnosis     ICD-10-CM    1. Left lumbar radiculopathy  M54.16 Ambulatory referral to Physical Therapy                     Assessment  Assessment details: Pt is 81 yo male presenting to therapy following recurrent left lumbar radiculopathy. Pt has decreased lumbar rom and general LE flexibility. Pt has good LE strength, fair core stability. Pt seems to benefit from flexion biased exercises with subjective reportings. Pt would benefit from PT to improve flexibility, rom, and core stability to decreased radicular symptoms. Impairments: abnormal or restricted ROM, activity intolerance, impaired physical strength, lacks appropriate home exercise program and pain with function    Symptom irritability: lowUnderstanding of Dx/Px/POC: good   Prognosis: good    Goals  1. Pt will be independent with HEP upon discharge. 2. Pt will improve lumbar rom to WNL and painfree. 3. Pt will report being able to walk for 30 minutes without radicular symptoms. 4. Pt will report being able to stand for 30 minutes without radicular symptoms. Plan  Plan details: 1-2xweek for 8 weeks. Patient would benefit from: skilled physical therapy  Planned therapy interventions: functional ROM exercises, therapeutic activities, therapeutic exercise, therapeutic training, stretching, strengthening, home exercise program, neuromuscular re-education, manual therapy and patient education  Frequency: 2x week  Duration in weeks: 8  Treatment plan discussed with: patient        Subjective Evaluation    History of Present Illness  Mechanism of injury: Pt reports pain for about 2 months ago from chasing the , last year he had about the same pain and had an injection. Pt recently saw pain mgt, schedule MRI and a course of prednisone. Prednisone did help with pain, pain is now returning. Burning pain increases after walking, standing 15-20 minutes, sitting for 2 minutes dissipates the pain. Pain same in anterior lateral thigh.     Pt   Quality of life: good    Patient Goals  Patient goals for therapy: decreased pain    Pain  Current pain ratin  At best pain ratin  At worst pain ratin  Location: anterior lateral thigh left  Quality: burning  Relieving factors: rest and change in position (sitting)  Aggravating factors: standing and walking  Progression: no change    Exercise history:  activities    Treatments  Previous treatment: physical therapy        Objective     Active Range of Motion     Lumbar   Flexion:  Restriction level: minimal  Extension:  Restriction level: moderate  Left lateral flexion:  Restriction level: moderate  Right lateral flexion:  Restriction level: minimal  Left rotation:  WFL  Right rotation:  Restriction level: minimal  Mechanical Assessment    Cervical      Thoracic      Lumbar    Standing flexion: repeated movements   Pain location:no change  Standing extension: repeated movements  Pain location: no change    Strength/Myotome Testing     Left Hip   Planes of Motion   Flexion: 5  Extension: 5  Abduction: 5    Right Hip   Planes of Motion   Flexion: 5  Extension: 5  Abduction: 5    Left Knee   Extension: 5    Right Knee   Extension: 5    Left Ankle/Foot   Dorsiflexion: 5    Right Ankle/Foot   Dorsiflexion: 5             Precautions: SI, pelvic floor       Manuals                                                                 Neuro Re-Ed                                                                                                        Ther Ex             LTR 10x5''            SKTC 10x5''            Supine piriformis stretch 3x15''            Seated Hamstring stretch 3x15''            Seated/Standing Lumbar Flexion HEP            Heel Taps                                       Ther Activity             Treadmill                          Gait Training                                       Modalities

## 2023-09-28 ENCOUNTER — APPOINTMENT (OUTPATIENT)
Dept: PHYSICAL THERAPY | Facility: CLINIC | Age: 83
End: 2023-09-28
Payer: COMMERCIAL

## 2023-09-28 ENCOUNTER — OFFICE VISIT (OUTPATIENT)
Dept: PHYSICAL THERAPY | Facility: CLINIC | Age: 83
End: 2023-09-28
Payer: COMMERCIAL

## 2023-09-28 DIAGNOSIS — M54.16 LEFT LUMBAR RADICULOPATHY: Primary | ICD-10-CM

## 2023-09-28 PROCEDURE — 97110 THERAPEUTIC EXERCISES: CPT | Performed by: PHYSICAL THERAPIST

## 2023-09-28 PROCEDURE — 97112 NEUROMUSCULAR REEDUCATION: CPT | Performed by: PHYSICAL THERAPIST

## 2023-09-28 PROCEDURE — 97140 MANUAL THERAPY 1/> REGIONS: CPT | Performed by: PHYSICAL THERAPIST

## 2023-09-28 NOTE — PROGRESS NOTES
Daily Note     Today's date: 2023  Patient name: Jelly Barros  : 1940  MRN: [id number]  Referring provider: Corrie Alford DO  Dx:   Encounter Diagnosis     ICD-10-CM    1. Left lumbar radiculopathy  M54.16                      Subjective: Pt reports the piriformis stretch was a little too much so he had to stop. Objective: See treatment diary below      Assessment: Pt tolerating exercises well. Rt piriformis is still limited and responded better to more manual stretching than self stretching. Discussed stretching after some blood flow in the morning or modified piriformis opp knee to shoulder. Continue to progress as able. Plan: Continue per plan of care.       Precautions: SI, pelvic floor       Manuals            Piriformis stretch  FH           Hamstring stretch  FH                                     Neuro Re-Ed             Rows [place]  2x10 20#           SH Ext [place]  2x10 20#                                                                            Ther Ex             LTR 10x5'' 10x5''           SKTC 10x5'' 10x5''           Supine piriformis stretch 3x15'' manual           Seated Hamstring stretch 3x15'' manual           Seated/Standing Lumbar Flexion HEP W/ PB 10x5''           Heel Taps  10xea           Standing hip 3 ways  20xea           Bike  5'           Ther Activity             Treadmill                          Gait Training                                       Modalities

## 2023-09-30 ENCOUNTER — HOSPITAL ENCOUNTER (OUTPATIENT)
Dept: MRI IMAGING | Facility: HOSPITAL | Age: 83
Discharge: HOME | End: 2023-09-30
Attending: ANESTHESIOLOGY
Payer: COMMERCIAL

## 2023-09-30 DIAGNOSIS — M54.16 LEFT LUMBAR RADICULOPATHY: ICD-10-CM

## 2023-09-30 PROCEDURE — G1004 CDSM NDSC: HCPCS

## 2023-09-30 PROCEDURE — 72148 MRI LUMBAR SPINE W/O DYE: CPT

## 2023-10-03 ENCOUNTER — OFFICE VISIT (OUTPATIENT)
Dept: PHYSICAL THERAPY | Facility: CLINIC | Age: 83
End: 2023-10-03
Payer: COMMERCIAL

## 2023-10-03 DIAGNOSIS — M54.16 LEFT LUMBAR RADICULOPATHY: Primary | ICD-10-CM

## 2023-10-03 PROCEDURE — 97112 NEUROMUSCULAR REEDUCATION: CPT | Performed by: PHYSICAL THERAPIST

## 2023-10-03 PROCEDURE — 97110 THERAPEUTIC EXERCISES: CPT | Performed by: PHYSICAL THERAPIST

## 2023-10-03 PROCEDURE — 97140 MANUAL THERAPY 1/> REGIONS: CPT | Performed by: PHYSICAL THERAPIST

## 2023-10-03 NOTE — PROGRESS NOTES
Daily Note     Today's date: 10/3/2023  Patient name: Julio Ascencio  : 1940  MRN: [id number]  Referring provider: Chang Negrete DO  Dx:   Encounter Diagnosis     ICD-10-CM    1. Left lumbar radiculopathy  M54.16                      Subjective: Pt reports still having difficulty with the piriformis stretch at home. Objective: See treatment diary below      Assessment: Discussed changing piriformis stretch at home to opp knee to shoulder or laying down for less hip flexion. Pt was progressed with some strengthening exercises which was faitguing. Continue to progress as able. Plan: Continue per plan of care.       Precautions: SI, pelvic floor       Manuals 9/26 9/28 10/3          Piriformis stretch  FH FH          Hamstring stretch  FH FH                                    Neuro Re-Ed             Rows [place]  2x10 20# 2x15 25#          SH Ext [place]  2x10 20# 2x15 20#                                                                           Ther Ex             LTR 10x5'' 10x5'' 10x5''          SKTC 10x5'' 10x5'' 10x5''          Supine piriformis stretch 3x15'' manual           Seated Hamstring stretch 3x15'' manual           Seated/Standing Lumbar Flexion HEP W/ PB 10x5'' W/ PB 10x5''          Heel Taps  10xea 2x10 ea          Standing hip 3 ways  20xea 20xea          Bike  5' 8' L4          Ther Activity             Treadmill                          Gait Training                                       Modalities

## 2023-10-05 ENCOUNTER — TELEPHONE (OUTPATIENT)
Age: 83
End: 2023-10-05

## 2023-10-05 ENCOUNTER — OFFICE VISIT (OUTPATIENT)
Dept: PHYSICAL THERAPY | Facility: CLINIC | Age: 83
End: 2023-10-05
Payer: COMMERCIAL

## 2023-10-05 DIAGNOSIS — M48.061 SPINAL STENOSIS OF LUMBAR REGION, UNSPECIFIED WHETHER NEUROGENIC CLAUDICATION PRESENT: Primary | ICD-10-CM

## 2023-10-05 DIAGNOSIS — R93.7 ABNORMAL MRI, LUMBAR SPINE: Primary | ICD-10-CM

## 2023-10-05 DIAGNOSIS — M54.16 LEFT LUMBAR RADICULOPATHY: Primary | ICD-10-CM

## 2023-10-05 PROCEDURE — 97140 MANUAL THERAPY 1/> REGIONS: CPT | Performed by: PHYSICAL THERAPIST

## 2023-10-05 PROCEDURE — 97112 NEUROMUSCULAR REEDUCATION: CPT | Performed by: PHYSICAL THERAPIST

## 2023-10-05 PROCEDURE — 97110 THERAPEUTIC EXERCISES: CPT | Performed by: PHYSICAL THERAPIST

## 2023-10-05 NOTE — TELEPHONE ENCOUNTER
S/w pt, advised of above. Provided phone number for central scheduling. Per pt, he will contact them now and schedule his mri for 6 - 12 weeks from now. Advised pt that he will need to have bw done prior to his mri s/t contrast. The writer will fu w/ SL and request the orders are placed. Pt stated that he had significant improvement with the oral steroids while he was taking them however his pain has returned at this point. Advised pt, the writer will make SL aware. Anticipate a cb re: procedure with SL. Per pt, he is familiar with [facility] and takes xarelto s/t blood clot in his foot. Advised pt, fu with MG on 10/17 as scheduled. The writer will d/w SL andcb if there is anything different or additional. Pt verbalized understanding and appreciation.

## 2023-10-05 NOTE — TELEPHONE ENCOUNTER
Caller: Radiology     Doctor: Dr Lawler Every    Reason for call: Radiology called please call back regarding findings     Call back#: [phone]

## 2023-10-05 NOTE — PROGRESS NOTES
Daily Note     Today's date: 10/5/2023  Patient name: Maryjane Dallas  : 1940  MRN: [id number]  Referring provider: Zohra Holm DO  Dx:   Encounter Diagnosis     ICD-10-CM    1. Left lumbar radiculopathy  M54.16                      Subjective: Pt reports he was moving rocks around without pain. As long as he's moving he feeling good. Objective: See treatment diary below      Assessment: Pt is showing improvement with LE flexibility, mostly hamstrings. Pt is tolerating exercises well, progressed to resisted trunk rotation today which was tolerated well. Continue to progress as able. Plan: Continue per plan of care.       Precautions: SI, pelvic floor       Manuals 9/26 9/28 10/3 10/5         Piriformis stretch  FH FH FH         Hamstring stretch  FH FH FH         LAD    FH                      Neuro Re-Ed             Rows [place]  2x10 20# 2x15 25# 2x15 20#         SH Ext [place]  2x10 20# 2x15 20# 2x15 20#         T/S Rot [place]    2x10ea 10#                                                             Ther Ex             LTR 10x5'' 10x5'' 10x5'' 10x5''         SKTC 10x5'' 10x5'' 10x5'' 10x5''         Supine piriformis stretch 3x15'' manual           Seated Hamstring stretch 3x15'' manual           Seated/Standing Lumbar Flexion HEP W/ PB 10x5'' W/ PB 10x5'' W/ PB 10x5''         Heel Taps  10xea 2x10 ea 2x10 ea         Standing hip 3 ways  20xea 20xea 20xea         Bike  5' 8' L4 8' L4         Ther Activity             Treadmill                          Gait Training                                       Modalities

## 2023-10-05 NOTE — TELEPHONE ENCOUNTER
Please notify the patient the MRI of her lumbar spine demonstrates bone marrow edema along the inferior endplate of L2. Follow-up MRI with and without contrast in 6 to 12 weeks was recommended. This MRI has been ordered for the future. 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

## 2023-10-10 ENCOUNTER — OFFICE VISIT (OUTPATIENT)
Dept: PHYSICAL THERAPY | Facility: CLINIC | Age: 83
End: 2023-10-10
Payer: COMMERCIAL

## 2023-10-10 DIAGNOSIS — M54.16 LEFT LUMBAR RADICULOPATHY: Primary | ICD-10-CM

## 2023-10-10 PROCEDURE — 97140 MANUAL THERAPY 1/> REGIONS: CPT | Performed by: PHYSICAL THERAPIST

## 2023-10-10 PROCEDURE — 97110 THERAPEUTIC EXERCISES: CPT | Performed by: PHYSICAL THERAPIST

## 2023-10-10 PROCEDURE — 97112 NEUROMUSCULAR REEDUCATION: CPT | Performed by: PHYSICAL THERAPIST

## 2023-10-10 NOTE — PROGRESS NOTES
Daily Note     Today's date: 10/10/2023  Patient name: Yamileth Simon  : 1940  MRN: [id number]  Referring provider: Elaine Beck DO  Dx:   Encounter Diagnosis     ICD-10-CM    1. Left lumbar radiculopathy  M54.16                      Subjective: Pt reports repeat MRI in 2 months to assess bone marrow edema at L2. Objective: See treatment diary below      Assessment: Pt is curious about his MRI results relating to his symptoms and pain. The radicular symptoms only come during walking slow through a flea market, he will test this weekend. Pt is tolerating strengthening exercises well and is showing improvement with flexibility. Plan: Continue per plan of care.       Precautions: SI, pelvic floor       Manuals 9/26 9/28 10/3 10/5 10/10        Piriformis stretch  FH FH FH FH        Hamstring stretch  FH FH FH FH        LAD    FH FH                     Neuro Re-Ed             Rows [place]  2x10 20# 2x15 25# 2x15 20# 2x15 25#        SH Ext [place]  2x10 20# 2x15 20# 2x15 20# 2x15 20#        T/S Rot [place]    2x10ea 10# 2x10ea 10#                                                            Ther Ex             LTR 10x5'' 10x5'' 10x5'' 10x5'' 10x5''        SKTC 10x5'' 10x5'' 10x5'' 10x5'' 10x5''        Supine piriformis stretch 3x15'' manual           Seated Hamstring stretch 3x15'' manual           Seated/Standing Lumbar Flexion HEP W/ PB 10x5'' W/ PB 10x5'' W/ PB 10x5''         Heel Taps  10xea 2x10 ea 2x10 ea 2x10ea        Standing hip 3 ways  20xea 20xea 20xea 20xea        Bike  5' 8' L4 8' L4 8' L5        Leg Press             Ther Activity             Treadmill                          Gait Training                                       Modalities

## 2023-10-11 ENCOUNTER — TELEPHONE (OUTPATIENT)
Age: 83
End: 2023-10-11

## 2023-10-11 NOTE — TELEPHONE ENCOUNTER
Attempted to contact pt, left a detailed message on machine per medical communication consent on file advising pt to keep the appt on 10/17, arrive at 0900 for 0915 appt to discuss his increased pain and options moving forward. Provided cb number and office hours for questions / issues.

## 2023-10-11 NOTE — TELEPHONE ENCOUNTER
Caller: Koki Crew    Doctor: Savanna Yan    Reason for call: Pt was returning call I advised him per notes to keep his appt with Lorena Baker for 10/17    Call back#: [phone]

## 2023-10-11 NOTE — TELEPHONE ENCOUNTER
Caller: Prem Mills    Doctor: Dr Clara Ann    Reason for call: Patient calling asking if he should keep appointment on 10/17 with [facility] please advise     Call back#: [phone] Paper prescription were not signed. Called Dr. Fernandes and she was able to electronically submit prescriptions to [place] [place] [facility] (levofloxin, trazodone) and percocet ([facility] Patient was notified

## 2023-10-12 ENCOUNTER — OFFICE VISIT (OUTPATIENT)
Dept: PHYSICAL THERAPY | Facility: CLINIC | Age: 83
End: 2023-10-12
Payer: COMMERCIAL

## 2023-10-12 DIAGNOSIS — M54.16 LEFT LUMBAR RADICULOPATHY: Primary | ICD-10-CM

## 2023-10-12 PROCEDURE — 97110 THERAPEUTIC EXERCISES: CPT

## 2023-10-12 PROCEDURE — 97140 MANUAL THERAPY 1/> REGIONS: CPT

## 2023-10-12 PROCEDURE — 97112 NEUROMUSCULAR REEDUCATION: CPT

## 2023-10-12 NOTE — PROGRESS NOTES
Daily Note     Today's date: 10/12/2023  Patient name: Gomez Rubinstein  : 1940  MRN: [id number]  Referring provider: Halie Sweeney DO  Dx:   Encounter Diagnosis     ICD-10-CM    1. Left lumbar radiculopathy  M54.16                      Subjective: Patient reports he has been referred to a neurosurgeon. Objective: See treatment diary below      Assessment: Patient denies any leg pain today. Minimal low back pain following exercises. Patient demonstrates improved understanding of Lumbar stabilization. Discussed use of L/S brace when he is doing his flea market activities to support. Patient verbalizes understanding. Plan: Continue per plan of care.       Precautions: SI, pelvic floor       Manuals 9/26 9/28 10/3 10/5 10/10 10/12       Piriformis stretch  FH FH FH FH ksg       Hamstring stretch  FH FH FH FH ksg       LAD    FH FH ksg                    Neuro Re-Ed             Rows [place]  2x10 20# 2x15 25# 2x15 20# 2x15 25# 2x15  25#       SH Ext [place]  2x10 20# 2x15 20# 2x15 20# 2x15 20# 2x15  20#       T/S Rot [place]    2x10ea 10# 2x10ea 10# 2x10 ea  10#                                                           Ther Ex             LTR 10x5'' 10x5'' 10x5'' 10x5'' 10x5'' 10x5"       SKTC 10x5'' 10x5'' 10x5'' 10x5'' 10x5'' 10x 5"       Supine piriformis stretch 3x15'' manual           Seated Hamstring stretch 3x15'' manual           Seated/Standing Lumbar Flexion HEP W/ PB 10x5'' W/ PB 10x5'' W/ PB 10x5''         Heel Taps  10xea 2x10 ea 2x10 ea 2x10ea 2x10 ea       Standing hip 3 ways  20xea 20xea 20xea 20xea        Bike  5' 8' L4 8' L4 8' L5 8" L5       Leg Press             Ther Activity             Treadmill                          Gait Training                                       Modalities

## 2023-10-17 ENCOUNTER — OFFICE VISIT (OUTPATIENT)
Dept: PAIN MEDICINE | Facility: CLINIC | Age: 83
End: 2023-10-17
Payer: COMMERCIAL

## 2023-10-17 ENCOUNTER — APPOINTMENT (OUTPATIENT)
Dept: RADIOLOGY | Facility: CLINIC | Age: 83
End: 2023-10-17
Payer: COMMERCIAL

## 2023-10-17 ENCOUNTER — OFFICE VISIT (OUTPATIENT)
Dept: PHYSICAL THERAPY | Facility: CLINIC | Age: 83
End: 2023-10-17
Payer: COMMERCIAL

## 2023-10-17 VITALS
SYSTOLIC BLOOD PRESSURE: 130 MMHG | HEIGHT: 68 IN | TEMPERATURE: 97.4 F | HEART RATE: 63 BPM | WEIGHT: 266 LBS | DIASTOLIC BLOOD PRESSURE: 80 MMHG | BODY MASS INDEX: 40.32 KG/M2

## 2023-10-17 DIAGNOSIS — M25.552 PAIN OF LEFT HIP: ICD-10-CM

## 2023-10-17 DIAGNOSIS — M47.816 LUMBAR SPONDYLOSIS: Primary | ICD-10-CM

## 2023-10-17 DIAGNOSIS — M54.16 LEFT LUMBAR RADICULOPATHY: Primary | ICD-10-CM

## 2023-10-17 DIAGNOSIS — M54.50 CHRONIC LEFT-SIDED LOW BACK PAIN WITHOUT SCIATICA: ICD-10-CM

## 2023-10-17 DIAGNOSIS — G89.29 CHRONIC LEFT-SIDED LOW BACK PAIN WITHOUT SCIATICA: ICD-10-CM

## 2023-10-17 DIAGNOSIS — G57.12 MERALGIA PARAESTHETICA, LEFT: ICD-10-CM

## 2023-10-17 PROCEDURE — 73502 X-RAY EXAM HIP UNI 2-3 VIEWS: CPT

## 2023-10-17 PROCEDURE — 97110 THERAPEUTIC EXERCISES: CPT | Performed by: PHYSICAL THERAPIST

## 2023-10-17 PROCEDURE — 99214 OFFICE O/P EST MOD 30 MIN: CPT | Performed by: PHYSICIAN ASSISTANT

## 2023-10-17 NOTE — PROGRESS NOTES
Daily Note     Today's date: 10/17/2023  Patient name: Latonya Stephens  : 1940  MRN: [id number]  Referring provider: Milena Donovan DO  Dx:   Encounter Diagnosis     ICD-10-CM    1. Left lumbar radiculopathy  M54.16                      Subjective: Pt reports he had an appt with pain mgt. He is referred to neurosurgery as well as scheduled for a repeat MRI. Objective: See treatment diary below      Assessment: Pt is tolerating strengthening exercises well. Pt is also showing improved flexibility for hamstring length. Rt piriformis continues to be more limited than his lt. Plan: Continue per plan of care.       Precautions: SI, pelvic floor       Manuals 9/26 9/28 10/3 10/5 10/10 10/12 10/17      Piriformis stretch  FH FH FH FH ksg FH      Hamstring stretch  FH FH FH FH ksg FH      LAD    FH FH ksg FH                   Neuro Re-Ed             Rows [place]  2x10 20# 2x15 25# 2x15 20# 2x15 25# 2x15  25# 2x15 25#      SH Ext [place]  2x10 20# 2x15 20# 2x15 20# 2x15 20# 2x15  20# 2x15 20#      T/S Rot [place]    2x10ea 10# 2x10ea 10# 2x10 ea  10# 2x10ea 14#                                                          Ther Ex             LTR 10x5'' 10x5'' 10x5'' 10x5'' 10x5'' 10x5" 10x5''      SKTC 10x5'' 10x5'' 10x5'' 10x5'' 10x5'' 10x 5" 10x 5"      Supine piriformis stretch 3x15'' manual           Seated Hamstring stretch 3x15'' manual           Seated/Standing Lumbar Flexion HEP W/ PB 10x5'' W/ PB 10x5'' W/ PB 10x5''         Heel Taps  10xea 2x10 ea 2x10 ea 2x10ea 2x10 ea 2x10ea      Standing hip 3 ways  20xea 20xea 20xea 20xea        Bike  5' 8' L4 8' L4 8' L5 8" L5 8' L5      Leg Press             Ther Activity             Treadmill                          Gait Training                                       Modalities

## 2023-10-17 NOTE — PROGRESS NOTES
Assessment:  1. Lumbar spondylosis    2. Chronic left-sided low back pain without sciatica    3. Meralgia paraesthetica, left    4. Pain of left hip        Plan:  While the patient was in the office today, I did have a thorough conversation regarding their chronic pain syndrome, medication management, and treatment plan options. After discussing options, I have recommended and ordered an EMG of the lower extremities to further assess the burning component of his left lower extremity. This may be consistent with a meralgia paresthetica which could potentially be addressed with a lateral femoral cutaneous nerve block. The MRI of his lumbar spine does not show any significant stenosis that would lead me to believe that his leg pain is coming from his spine henceforth the EMG study. I have also recommended that the patient undergo an x-ray of the left hip. He will proceed with the repeat MRI with contrast that is scheduled in December and follow-up to the abnormal study that was recently done. Depending upon those results, he may be a candidate for diagnostic lumbar medial branch blocks. I spent time discussing this procedure and the RFA procedure/process with him. This point time he would like to hold off on any interventional treatment. Continue physical therapy and home exercises as tolerated. The patient will follow-up in 12 weeks for medication prescription refill and reevaluation. The patient was advised to contact the office should their symptoms worsen in the interim. The patient was agreeable and verbalized an understanding. History of Present Illness: The patient is a 82 y.o. male last seen on 9/13/2023 who presents for a follow up office visit in regards to chronic back pain and left leg pain. .  The patient currently reports chronic left low back pain and left anterior lateral thigh pain that he presently rates a 3 out of 10.   He describes the left anterior lateral thigh as an intermittent burning type of pain. He feels that it is unrelated to his low back pain which is also intermittent but described as a sharp stabbing pain. Last visit was his initial consultation at which point we ordered an MRI of the lumbar spine. He also was given a round of prednisone which helped significantly for the duration of the medication followed by a return of the same pain patterns. His MRI revealed prominent focal bone marrow edema along the inferior endplate of the L2 vertebral body and a follow-up MRI with contrast was recommended in 6 to 12 weeks. The patient has this study scheduled in December. It also revealed multilevel degenerative changes with mild central and foraminal narrowing. The patient got a phone call regarding a neurosurgical consultation which he declined. He is not interested in surgical intervention. He has been participating in physical therapy in his own home exercises which seems helpful. Patient has no new symptoms or acute issues on today's visit. I have personally reviewed and/or updated the patient's past medical history, past surgical history, family history, social history, current medications, allergies, and vital signs today. Review of Systems:    Review of Systems   Respiratory:  Negative for shortness of breath. Cardiovascular:  Negative for chest pain. Gastrointestinal:  Negative for constipation, diarrhea, nausea and vomiting. Musculoskeletal:  Negative for arthralgias, gait problem, joint swelling and myalgias. Skin:  Negative for rash. Neurological:  Positive for dizziness. Negative for seizures and weakness. All other systems reviewed and are negative.         Past Medical History:   Diagnosis Date   • Arthritis    • Bladder neck contracture    • Cancer [facility])    • ED (erectile dysfunction)    • Frequency of urination    • Hypercholesterolemia    • Hypertension     12/13/17   • Nocturia    • Prostate cancer ([address])     12/8/16   • Stress incontinence        Past Surgical History:   Procedure Laterality Date   • BLADDER SURGERY      Bladder Neck Restriction, SP Catheter   • CARPAL TUNNEL RELEASE     • CHOLECYSTECTOMY     • CYSTOSTOMY W/ BLADDER DILATION     • PROSTATECTOMY      Radical   • URETHRAL SLING         Family History   Problem Relation Age of Onset   • Coronary artery disease Father         involving native coronary artery of native heart, angina presence unspecified       Social History     Occupational History   • Not on file   Tobacco Use   • Smoking status: Former     Packs/day: 1.50     Years: 12.00     Total pack years: 18.00     Types: Cigarettes     Start date: 58     Quit date: 0     Years since quittin.8   • Smokeless tobacco: Never   Vaping Use   • Vaping Use: Never used   Substance and Sexual Activity   • Alcohol use: No   • Drug use: No   • Sexual activity: Not on file         Current Outpatient Medications:   •  lisinopril (ZESTRIL) 5 mg tablet, TAKE ONE BY MOUTH DAILY, Disp: 90 tablet, Rfl: 3  •  Xarelto 20 MG tablet, TAKE ONE TABLET (20 MG TOTAL) BY MOUTH DAILY WITH BREAKFAST, Disp: 90 tablet, Rfl: 1  •  predniSONE 10 mg tablet, Take according to titration schedule (Patient not taking: Reported on 10/17/2023), Disp: 36 tablet, Rfl: 0    No Known Allergies    Physical Exam:    /80 (BP Location: Left arm, Patient Position: Sitting, Cuff Size: Large)   Pulse 63   Temp (!) 97.4 °F (36.3 °C)   Ht 5' 8" (1.727 m)   Wt 121 kg (266 lb)   BMI 40.45 kg/m²     Constitutional:normal, well developed, well nourished, alert, in no distress and non-toxic and no overt pain behavior.  and overweight  Eyes:anicteric  HEENT:grossly intact  Neck:supple, symmetric, trachea midline and no masses   Pulmonary:even and unlabored  Cardiovascular:No edema or pitting edema present  Skin:Normal without rashes or lesions and well hydrated  Psychiatric:Mood and affect appropriate  Neurologic:Cranial Nerves II-XII grossly intact  Musculoskeletal:normal      Imaging    MRI LUMBAR SPINE WITHOUT CONTRAST 9/13/2023 @ [facility]     INDICATION: M54.16: Radiculopathy, lumbar region. COMPARISON: Lumbar spine radiograph 8/28/2023. TECHNIQUE:  Multiplanar, multisequence imaging of the lumbar spine was performed. .        IMAGE QUALITY:  Diagnostic     FINDINGS:     VERTEBRAL BODIES:  There are 5 lumbar type vertebral bodies. Minor dextroscoliosis of lumbar spine. No spondylolysis or spondylolisthesis. No compression fracture. Prominent focal bone marrow edema along the inferior endplate of L2 vertebral body, indeterminate but may represent acute/subacute Schmorl's node. Type I Modic endplate change along anterior corner endplates of K5-E1. L5 intraosseous vertebral body hemangioma. Otherwise, normal bone marrow signal.     SACRUM:  Normal signal within the sacrum. No evidence of insufficiency or stress fracture. DISTAL CORD AND CONUS:  Normal size and signal within the distal cord and conus. PARASPINAL SOFT TISSUES:  Paraspinal soft tissues are unremarkable. LOWER THORACIC DISC SPACES:  Normal disc height and signal.  No disc herniation, canal stenosis or foraminal narrowing. LUMBAR DISC SPACES: Multilevel osteophytes, disc height loss, and facet arthropathy. L1-L2: Mild diffuse disc bulge. No significant canal stenosis or foraminal narrowing. L2-L3: Mild diffuse disc bulge. Facet arthropathy, ligamentum flavum thickening, trace facet joint effusions. No significant canal stenosis or foraminal narrowing. L3-L4: Diffuse disc bulge. Facet arthropathy, ligamentum flavum thickening. Mild canal stenosis. Mild bilateral foraminal narrowing. L4-L5: Diffuse disc bulge, narrowed bilateral subarticular zones. Facet arthropathy, ligamentum flavum thickening, trace facet joint effusions, tiny synovial cyst in left posterolateral epidural space. Mild canal stenosis.  Mild bilateral foraminal   narrowing (right worse than left). L5-S1: Facet arthropathy, trace facet joint effusions. No significant canal stenosis or foraminal narrowing. OTHER FINDINGS:  None. IMPRESSION:     Prominent focal bone marrow edema along inferior endplate of L2 vertebral body, indeterminate but may represent acute/subacute Schmorl's node. Recommend follow-up MRI lumbar spine with and without contrast in 6-12 weeks given underlying T1 hypointense   bone marrow signal.     Multilevel degenerative changes of lumbar spine with varying degrees of canal stenosis (mild L3-L4 and L4-L5) and foraminal narrowing (mild bilateral L3-L4 and bilateral L4-L5), as detailed above. The study was marked in EPIC for significant notification.

## 2023-10-24 ENCOUNTER — OFFICE VISIT (OUTPATIENT)
Dept: PHYSICAL THERAPY | Facility: CLINIC | Age: 83
End: 2023-10-24
Payer: COMMERCIAL

## 2023-10-24 DIAGNOSIS — M54.16 LEFT LUMBAR RADICULOPATHY: Primary | ICD-10-CM

## 2023-10-24 PROCEDURE — 97110 THERAPEUTIC EXERCISES: CPT | Performed by: PHYSICAL THERAPIST

## 2023-10-24 PROCEDURE — 97140 MANUAL THERAPY 1/> REGIONS: CPT | Performed by: PHYSICAL THERAPIST

## 2023-10-24 NOTE — PROGRESS NOTES
Daily Note     Today's date: 10/24/2023  Patient name: Sasha Pollard  : 1940  MRN: [id number]  Referring provider: James Arriaza DO  Dx:   Encounter Diagnosis     ICD-10-CM    1. Left lumbar radiculopathy  M54.16                      Subjective: Pt reports he got an xray last week. Pt reports he only needed to sit down once when at the flea market this past weekend. Objective: See treatment diary below      Assessment: Pt tolerating stretching and strengthening exercises well. Flexibility is showing good improvement. Discussed discharge next session to HEP. Pt understands and is agreeable to continuing stretching and strengthening on his own. Plan: Continue per plan of care. Discharge next visit.      Precautions: SI, pelvic floor       Manuals 9/26 9/28 10/3 10/5 10/10 10/12 10/17 10/24     Piriformis stretch  FH FH FH FH ksg FH FH     Hamstring stretch  FH FH FH FH ksg FH FH     LAD    FH FH ksg FH FH                  Neuro Re-Ed             Rows [place]  2x10 20# 2x15 25# 2x15 20# 2x15 25# 2x15  25# 2x15 25# 2x15 26#     SH Ext [place]  2x10 20# 2x15 20# 2x15 20# 2x15 20# 2x15  20# 2x15 20# 2x15 21#     T/S Rot [place]    2x10ea 10# 2x10ea 10# 2x10 ea  10# 2x10ea 14# 2x10ea 14#                                                         Ther Ex             LTR 10x5'' 10x5'' 10x5'' 10x5'' 10x5'' 10x5" 10x5'' 10x5''     SKTC 10x5'' 10x5'' 10x5'' 10x5'' 10x5'' 10x 5" 10x 5" 10x5''     Supine piriformis stretch 3x15'' manual           Seated Hamstring stretch 3x15'' manual           Seated/Standing Lumbar Flexion HEP W/ PB 10x5'' W/ PB 10x5'' W/ PB 10x5''         Heel Taps  10xea 2x10 ea 2x10 ea 2x10ea 2x10 ea 2x10ea 2x10 ea     Standing hip 3 ways  20xea 20xea 20xea 20xea        Bike  5' 8' L4 8' L4 8' L5 8" L5 8' L5 8' L5     Leg Press             Ther Activity             Treadmill                          Gait Training                                       Modalities

## 2023-10-26 ENCOUNTER — OFFICE VISIT (OUTPATIENT)
Dept: PHYSICAL THERAPY | Facility: CLINIC | Age: 83
End: 2023-10-26
Payer: COMMERCIAL

## 2023-10-26 DIAGNOSIS — M54.16 LEFT LUMBAR RADICULOPATHY: Primary | ICD-10-CM

## 2023-10-26 PROCEDURE — 97110 THERAPEUTIC EXERCISES: CPT | Performed by: PHYSICAL THERAPIST

## 2023-10-26 PROCEDURE — 97140 MANUAL THERAPY 1/> REGIONS: CPT | Performed by: PHYSICAL THERAPIST

## 2023-10-26 NOTE — PROGRESS NOTES
Daily Note     Today's date: 10/26/2023  Patient name: Jose Raul Whatley  : 1940  MRN: [id number]  Referring provider: Margaux Bray DO  Dx:   Encounter Diagnosis     ICD-10-CM    1. Left lumbar radiculopathy  M54.16                      Subjective: Pt reports he was moving a big tree so is sore from that. Objective: See treatment diary below      Assessment: Pt showing great flexibility with hamstrings, and improved motion with ER. Strength is improving as well. Pt feels he can continue with flexibility and strengthening at the gym. Pt is scheduled for MRI and EMG for further diagnostic testing. Plan:  Discharge PT services.      Precautions: SI, pelvic floor       Manuals 9/26 9/28 10/3 10/5 10/10 10/12 10/17 10/24 10/26    Piriformis stretch  FH FH FH FH ksg FH FH FH    Hamstring stretch  FH FH FH FH ksg FH FH FH    LAD    FH FH ksg FH FH FH                 Neuro Re-Ed             Rows [place]  2x10 20# 2x15 25# 2x15 20# 2x15 25# 2x15  25# 2x15 25# 2x15 26# 2x15 27#    SH Ext [place]  2x10 20# 2x15 20# 2x15 20# 2x15 20# 2x15  20# 2x15 20# 2x15 21# 2x15 21#    T/S Rot [place]    2x10ea 10# 2x10ea 10# 2x10 ea  10# 2x10ea 14# 2x10ea 14# 2x10 ea 14#                                                        Ther Ex             LTR 10x5'' 10x5'' 10x5'' 10x5'' 10x5'' 10x5" 10x5'' 10x5'' 10x5''    SKTC 10x5'' 10x5'' 10x5'' 10x5'' 10x5'' 10x 5" 10x 5" 10x5'' 10x5''    Supine piriformis stretch 3x15'' manual           Seated Hamstring stretch 3x15'' manual           Seated/Standing Lumbar Flexion HEP W/ PB 10x5'' W/ PB 10x5'' W/ PB 10x5''         Heel Taps  10xea 2x10 ea 2x10 ea 2x10ea 2x10 ea 2x10ea 2x10 ea 2x10 ea    Standing hip 3 ways  20xea 20xea 20xea 20xea        Bike  5' 8' L4 8' L4 8' L5 8" L5 8' L5 8' L5 8' L6    Leg Press             Ther Activity             Treadmill                          Gait Training                                       Modalities

## 2023-10-30 ENCOUNTER — TELEPHONE (OUTPATIENT)
Dept: PAIN MEDICINE | Facility: CLINIC | Age: 83
End: 2023-10-30

## 2023-10-30 NOTE — TELEPHONE ENCOUNTER
----- Message from Shmuel Sweeney PA-C sent at 10/30/2023  1:08 PM EDT -----  Please let patient know his left hip xray reveals moderate arthritis.   We can offer a left hip joint injection if patient interested

## 2023-10-31 DIAGNOSIS — M16.12 PRIMARY OSTEOARTHRITIS OF LEFT HIP: Primary | ICD-10-CM

## 2023-10-31 NOTE — TELEPHONE ENCOUNTER
S/w pt, advised of above. Pt verbalized agreement. Advised pt, the writer will make MG aware. Anticipate a cb from Samantha to schedule as discussed. Pt stated that he is receiving calls from  NS. Pt stated that he was under the impression that the neurosurgery cons was cancelled. Confirmed the order is listed as discontinued. Pt stated that he will disregard those messages. Pt stated that he is having difficulty getting his EMG scheduled. Advised pt to contact  Central scheduling and take the first available appt. Ask to be put on a cancellation list. Cb to advise this office of the date - This office will make an effort to get the appt moved to a sooner date. Provided central scheduling's number     Pt verbalized understanding and appreciation. Will proceed as discussed.

## 2023-11-01 NOTE — TELEPHONE ENCOUNTER
Caller: Gary Barker    Doctor: Demian Riggins    Reason for call:to schedule procedure     Call back#: [phone]

## 2023-11-02 NOTE — TELEPHONE ENCOUNTER
PRE OP INSTRUCTIONS:           Hold medication  x _ full days prior, last dose on _           Patient advised to hold medication from Date till their appointment at that time instructions to restart will be given. Patient stated verbal understanding. Aware that nursing may/will call to review hold dates as well. -If you are on prescription blood thinners, you may have to hold the medication for several days before the procedure. Please call the office to discuss medication holds at [phone].    -Do not eat or drink ONE HOUR prior to your procedure. If you are diabetic, may follow regular breakfast/lunch schedule and take usual    diabetic medications.  -Lumbar( low back) procedure, please wear comfortable slacks/pants.  -Cervical (neck) procedure, please wear a shirt/blouse that is easy to remove.  -A  is required to take you home form your procedure.  -Continue all to take prescribed medication the day of your procedure, including blood pressure medications.  -If you are prescribe antibiotics, have an active infection or have an open wound, please contact the office at [phone].  -Please refrain from any vaccinations two weeks before and two weeks after injection.  -Insurance authorization received in not a guarantee of payment per your insurance company's authorization disclaimer and it is    your responsibility to verify your benefits. -If you have any questions about the instructions, please call me at [phone]          -PATIENT INSTRUCTED TO STOP ALL NSAID'S 4 DAYS PRIOR TO PROCEDURE            EXCEPT FOR IBUPROFEN IT CAN BE TAKEN UP TO 24 HOURS PRIOR TO PROCEDURE. MBB PRE OP INSTRUCTIONS:             Please do not eat or drink 1 hour prior to the procedure. If you are not feeling well, have any kind of infection or are placed on antibiotics for any reason, please call the office,           we will have to reschedule your procedure. Patient on ABX procedure canceled till off ABX and S/S free for 48 hours          You will need a , 18 years or older. Reviewed instructions: , NPO 1 hour prior, loose-fitting/comfortable clothes, if ill/fever/infx/abx to call and reschedule. Also pain level at leat 5/10 if it is not please call and talk to nurse [phone]. Please continue to take any long acting pain medication as prescribed. Refrain from PRN, as-needed pain meds 6h prior and 6-8 hours after anything you would buy over the counter at a store. It is recommended that you try to continue with your normal activities of daily living to see if the medial branch block is helping. You will be sent home with a pain diary that you will need to return to us either by dropping it off, mailing it in, faxing it or you can           upload it to your my chart for the doctor to review to allow us to assess the next steps in your treatment. Patient called back and scheduled for Procedure    All pre procedure instructions were given to patient   Nothing to eat or drink for 1 hour prior  Loose fitting clothing   PATIENT INSTRUCTED TO STOP ALL NSAID'S 4 DAYS PRIOR TO PROCEDURE EXCEPT FOR IBUPROFEN IT CAN BE TAKEN UP TO 24 HOURS PRIOR TO PROCEDURE.    DENIES ANTICOAG'S OR ASA   Denies Antibx   Needs    Patient instructed to contact our office if becomes sick or gets put on any ANTIBIOTIC or has any active infections   Refrain from any vaccines 2 weeks before & 2 weeks after  Insurance auth received but is not a guarantee of payment per your insurance company's authorization disclaimer and it is your responsibility to verify your benefits   COVID -19 screening complete

## 2023-11-17 ENCOUNTER — HOSPITAL ENCOUNTER (OUTPATIENT)
Dept: NEUROLOGY | Facility: CLINIC | Age: 83
End: 2023-11-17
Payer: COMMERCIAL

## 2023-11-17 DIAGNOSIS — G57.12 MERALGIA PARAESTHETICA, LEFT: ICD-10-CM

## 2023-11-17 PROCEDURE — 95886 MUSC TEST DONE W/N TEST COMP: CPT | Performed by: PSYCHIATRY & NEUROLOGY

## 2023-11-17 PROCEDURE — 95912 NRV CNDJ TEST 11-12 STUDIES: CPT | Performed by: PSYCHIATRY & NEUROLOGY

## 2023-11-27 ENCOUNTER — HOSPITAL ENCOUNTER (OUTPATIENT)
Dept: RADIOLOGY | Facility: CLINIC | Age: 83
Discharge: HOME | End: 2023-11-27
Admitting: ANESTHESIOLOGY
Payer: COMMERCIAL

## 2023-11-27 VITALS
DIASTOLIC BLOOD PRESSURE: 79 MMHG | HEART RATE: 70 BPM | RESPIRATION RATE: 18 BRPM | TEMPERATURE: 96.9 F | OXYGEN SATURATION: 93 % | SYSTOLIC BLOOD PRESSURE: 152 MMHG

## 2023-11-27 DIAGNOSIS — M16.12 PRIMARY OSTEOARTHRITIS OF LEFT HIP: ICD-10-CM

## 2023-11-27 PROCEDURE — 20610 DRAIN/INJ JOINT/BURSA W/O US: CPT | Performed by: ANESTHESIOLOGY

## 2023-11-27 PROCEDURE — 77002 NEEDLE LOCALIZATION BY XRAY: CPT | Performed by: ANESTHESIOLOGY

## 2023-11-27 RX ORDER — ROPIVACAINE HYDROCHLORIDE 2 MG/ML
2 INJECTION, SOLUTION EPIDURAL; INFILTRATION; PERINEURAL ONCE
Status: COMPLETED | OUTPATIENT
Start: 2023-11-27 | End: 2023-11-27

## 2023-11-27 RX ORDER — METHYLPREDNISOLONE ACETATE 80 MG/ML
80 INJECTION, SUSPENSION INTRA-ARTICULAR; INTRALESIONAL; INTRAMUSCULAR; PARENTERAL; SOFT TISSUE ONCE
Status: COMPLETED | OUTPATIENT
Start: 2023-11-27 | End: 2023-11-27

## 2023-11-27 RX ADMIN — METHYLPREDNISOLONE ACETATE 80 MG: 80 INJECTION, SUSPENSION INTRA-ARTICULAR; INTRALESIONAL; INTRAMUSCULAR; SOFT TISSUE at 14:57

## 2023-11-27 RX ADMIN — ROPIVACAINE HYDROCHLORIDE 2 ML: 2 INJECTION, SOLUTION EPIDURAL; INFILTRATION at 14:57

## 2023-11-27 RX ADMIN — IOHEXOL 1 ML: 300 INJECTION, SOLUTION INTRAVENOUS at 14:57

## 2023-11-27 NOTE — DISCHARGE INSTRUCTIONS

## 2023-11-27 NOTE — H&P
History of Present Illness: The patient is a 82 y.o. male who presents with complaints of hip pain.     Past Medical History:   Diagnosis Date    Arthritis     Bladder neck contracture     Cancer [facility])     ED (erectile dysfunction)     Frequency of urination     Hypercholesterolemia     Hypertension     12/13/17    Nocturia     Prostate cancer ([address])     12/8/16    Stress incontinence        Past Surgical History:   Procedure Laterality Date    BLADDER SURGERY      Bladder Neck Restriction, SP Catheter    CARPAL TUNNEL RELEASE      CHOLECYSTECTOMY      CYSTOSTOMY W/ BLADDER DILATION      PROSTATECTOMY      Radical    URETHRAL SLING           Current Outpatient Medications:     lisinopril (ZESTRIL) 5 mg tablet, TAKE ONE BY MOUTH DAILY, Disp: 90 tablet, Rfl: 3    predniSONE 10 mg tablet, Take according to titration schedule (Patient not taking: Reported on 10/17/2023), Disp: 36 tablet, Rfl: 0    Xarelto 20 MG tablet, TAKE ONE TABLET (20 MG TOTAL) BY MOUTH DAILY WITH BREAKFAST, Disp: 90 tablet, Rfl: 1    Current Facility-Administered Medications:     iohexol (OMNIPAQUE) 300 mg/mL injection 1 mL, 1 mL, Intra-articular, Once, Donovan Mitchell DO    methylPREDNISolone acetate (DEPO-MEDROL) injection 80 mg, 80 mg, Intra-articular, Once, Donovan Mitchell DO    ropivacaine (NAROPIN) injection 2 mL, 2 mL, Intra-articular, Once, Donovan Mitchell DO    No Known Allergies    Physical Exam:   General: Awake, Alert, Oriented x 3, Mood and affect appropriate  Respiratory: Respirations even and unlabored  Cardiovascular: Peripheral pulses intact; no edema  Musculoskeletal Exam: decreased range of motion left hip    ASA Score: II         Assessment: Hip pain, hip arthritis    Plan: LT hip joint inj

## 2023-11-28 ENCOUNTER — TELEPHONE (OUTPATIENT)
Dept: NEUROSURGERY | Facility: CLINIC | Age: 83
End: 2023-11-28

## 2023-11-28 NOTE — TELEPHONE ENCOUNTER
Pt called to make appt for after 12/6/23 MRI, pt rec'd letter from us. Gave time date and location to pt and pt wife.

## 2023-12-01 ENCOUNTER — TELEPHONE (OUTPATIENT)
Dept: PAIN MEDICINE | Facility: CLINIC | Age: 83
End: 2023-12-01

## 2023-12-01 NOTE — TELEPHONE ENCOUNTER
S/w pt and advised of same. Pt requests message sent to [address] [place] [address]. Pt scheduled for MRI, advise he will be contacted with results. Pt appreciative of call.

## 2023-12-01 NOTE — TELEPHONE ENCOUNTER
----- Message from Yury Chapman PA-C sent at 12/1/2023  1:51 PM EST -----  Please let patient know his EMG study shows chronic left S1 radiculopathy. Continue with current plan of scheduled MRI.

## 2023-12-01 NOTE — TELEPHONE ENCOUNTER
----- Message from Ramez Redd PA-C sent at 12/1/2023  1:51 PM EST -----  Please let patient know his EMG study shows chronic left S1 radiculopathy. Continue with current plan of scheduled MRI.

## 2023-12-04 ENCOUNTER — TELEPHONE (OUTPATIENT)
Dept: PAIN MEDICINE | Facility: CLINIC | Age: 83
End: 2023-12-04

## 2023-12-06 ENCOUNTER — HOSPITAL ENCOUNTER (OUTPATIENT)
Dept: MRI IMAGING | Facility: HOSPITAL | Age: 83
Discharge: HOME | End: 2023-12-06
Attending: ANESTHESIOLOGY
Payer: COMMERCIAL

## 2023-12-06 DIAGNOSIS — R93.7 ABNORMAL MRI, LUMBAR SPINE: ICD-10-CM

## 2023-12-06 PROCEDURE — A9585 GADOBUTROL INJECTION: HCPCS | Performed by: ANESTHESIOLOGY

## 2023-12-06 PROCEDURE — 72158 MRI LUMBAR SPINE W/O & W/DYE: CPT

## 2023-12-06 PROCEDURE — G1004 CDSM NDSC: HCPCS

## 2023-12-06 RX ORDER — GADOBUTROL 604.72 MG/ML
12 INJECTION INTRAVENOUS
Status: COMPLETED | OUTPATIENT
Start: 2023-12-06 | End: 2023-12-06

## 2023-12-06 RX ADMIN — GADOBUTROL 12 ML: 604.72 INJECTION INTRAVENOUS at 09:22

## 2023-12-06 NOTE — TELEPHONE ENCOUNTER
Caller: David Swan  Doctor/office: Paula  CB#: [phone]    % of improvement: unsure       Pain Scale (1-10): 4

## 2023-12-11 ENCOUNTER — TELEPHONE (OUTPATIENT)
Age: 83
End: 2023-12-11

## 2023-12-11 NOTE — TELEPHONE ENCOUNTER
Caller: pt    Doctor: Anastasiia Foreman    Reason for call: pt is calling back    Call back#: [phone]

## 2023-12-11 NOTE — TELEPHONE ENCOUNTER
MRI lumbar spine ordered by Dr. Juan José Wagoner: No abnormal intrathecal or epidural enhancement. Mild arthritis      How and where is his pain?   Lower extremities versus lower back unilateral versus bilateral?  Could consider lumbar MBB

## 2023-12-11 NOTE — TELEPHONE ENCOUNTER
S/w pt, advised of mri report. Per pt, c/o significant pain in his L hip and down the front of his L thigh stopping at his knee, since Saturday. Pt stated that the pain is better today. Per pt, he was raking on Saturday - trying to clean up leaves, and he may have over done it. Advised pt, the writer would agree. Ok to use rest, ice / heat, medication as directed or prescribed. Cb if the pain returns x several days, no relief or improvement with conservative treatment or questions arise. Pt verbalized understanding and appreciation.

## 2023-12-11 NOTE — TELEPHONE ENCOUNTER
Caller: Therese Castilloo     Doctor: Dr Zohreh Garrison    Reason for call: Patient calling stating difficult to walk pain level 8/10 let side of HIP please advise     Call back#: [phone]

## 2023-12-12 ENCOUNTER — CONSULT (OUTPATIENT)
Dept: NEUROSURGERY | Facility: CLINIC | Age: 83
End: 2023-12-12
Payer: COMMERCIAL

## 2023-12-12 VITALS
WEIGHT: 263 LBS | BODY MASS INDEX: 39.86 KG/M2 | TEMPERATURE: 98.7 F | RESPIRATION RATE: 20 BRPM | HEIGHT: 68 IN | OXYGEN SATURATION: 95 % | SYSTOLIC BLOOD PRESSURE: 110 MMHG | HEART RATE: 50 BPM | DIASTOLIC BLOOD PRESSURE: 78 MMHG

## 2023-12-12 DIAGNOSIS — M76.32 ILIOTIBIAL BAND SYNDROME AFFECTING LEFT LOWER LEG: Primary | ICD-10-CM

## 2023-12-12 DIAGNOSIS — G57.12 MERALGIA PARAESTHETICA, LEFT: ICD-10-CM

## 2023-12-12 DIAGNOSIS — M54.16 LUMBAR RADICULOPATHY: ICD-10-CM

## 2023-12-12 PROCEDURE — 99203 OFFICE O/P NEW LOW 30 MIN: CPT | Performed by: NURSE PRACTITIONER

## 2023-12-12 NOTE — ASSESSMENT & PLAN NOTE
Symptoms as addressed in HPI   Denies bowel or bladder dysfunction , saddle anesthesia or extremity weakness  Reports PT/HEP improved symptoms temporarily . Developed an ankle blood clot therefore had no interventional treatments since 2021. Is on life long Xeralto as per patient. .   . His last and only RENETTA was 10/29/21 . Is status post a left meniscus tear in 2021 required surgical intervention by orthopedics. , symptoms started after a fall outside. Imagining  12/6/2023 MRI lumbar w/wo table degenerative spondylosis. Redemonstration of subacute Schmorl's node inferior endplate of L2 with enhancing marrow edema. Mild Modic type  I endplate degenerative changes at the superior endplate of L3. Hemangioma in the L1 vertebral body. No evidence of insufficiency or stress fracture. L4-L5: Disc bulge and facet arthropathy. Mild canal and mild foraminal stenosis. L5-S1: Minimal disc bulge with annular fissure. Facet arthropathy. No significant canal stenosis. Mild foraminal stenosis. 9/30/23MRI LUMBAR SPINE WITHOUT CONTRAST Prominent focal bone marrow edema along inferior endplate of L2 vertebral body, indeterminate but may represent acute/subacute Schmorl's node. Recommend follow-up MRI lumbar spine with and without contrast in 6-12 weeks given underlying T1 hypointense bone marrow signal. Multilevel degenerative changes of lumbar spine with varying degrees of canal stenosis (mild L3-L4 and L4-L5) and foraminal narrowing (mild bilateral L3-L4 and bilateral L4-L5), as detailed above. L4-L5: Diffuse disc bulge, narrowed bilateral subarticular zones. Facet arthropathy, ligamentum flavum thickening, trace facet joint effusions, tiny synovial cyst in left posterolateral epidural space. Mild canal stenosis. Mild bilateral foraminal narrowing (right worse than left). L5-S1: Facet arthropathy, trace facet joint effusions. No significant canal stenosis or foraminal narrowing. The study was marked in EPIC for significant notification. EMG 11/17/2023 reveals chronic denervation most consistent with left S1 radiculopathy. Plan   Reviewed MRI lumbar imagining  Continue care with PM, consider facet joint injections , multilevel facet arthropathy , L 4 L5 synovial cyst seen on repeat MRI imagining. Left thigh symptoms likely related to iliotibial band syndrome--referral to PT    Reviewed red flag signs if develop go to ed for assessment. If symptoms fail to improve justine the office. Advised if  additional questions or concerns contact the office. Continue physical therapy HEP.

## 2023-12-12 NOTE — ASSESSMENT & PLAN NOTE
Symptoms as addressed in HPI   Pain is consistent with this diagnosis   Symptoms of left lateral thigh pain do not correlate with locations of most concerning symptom. Plan   Referral to Physical therapy--initiate protocol for iliotibial band syndrome. Reviewed image of iliotibial band patient remarked this is exactly where the pain is located.

## 2023-12-12 NOTE — PROGRESS NOTES
Assessment/Plan:    Meralgia paraesthetica, left  Symptoms as addressed in HPI   Pain is consistent with this diagnosis   Symptoms of left lateral thigh pain do not correlate with locations of most concerning symptom. Plan   Referral to Physical therapy--initiate protocol for iliotibial band syndrome. Reviewed image of iliotibial band patient remarked this is exactly where the pain is located. Lumbar radiculopathy  Symptoms as addressed in HPI   Denies bowel or bladder dysfunction , saddle anesthesia or extremity weakness  Reports PT/HEP improved symptoms temporarily . Developed an ankle blood clot therefore had no interventional treatments since 2021. Is on life long Xeralto as per patient. .   . His last and only RENETTA was 10/29/21 . Is status post a left meniscus tear in 2021 required surgical intervention by orthopedics. , symptoms started after a fall outside. Imagining  12/6/2023 MRI lumbar w/wo table degenerative spondylosis. Redemonstration of subacute Schmorl's node inferior endplate of L2 with enhancing marrow edema. Mild Modic type  I endplate degenerative changes at the superior endplate of L3. Hemangioma in the L1 vertebral body. No evidence of insufficiency or stress fracture. L4-L5: Disc bulge and facet arthropathy. Mild canal and mild foraminal stenosis. L5-S1: Minimal disc bulge with annular fissure. Facet arthropathy. No significant canal stenosis. Mild foraminal stenosis. 9/30/23MRI LUMBAR SPINE WITHOUT CONTRAST Prominent focal bone marrow edema along inferior endplate of L2 vertebral body, indeterminate but may represent acute/subacute Schmorl's node. Recommend follow-up MRI lumbar spine with and without contrast in 6-12 weeks given underlying T1 hypointense bone marrow signal. Multilevel degenerative changes of lumbar spine with varying degrees of canal stenosis (mild L3-L4 and L4-L5) and foraminal narrowing (mild bilateral L3-L4 and bilateral L4-L5), as detailed above. L4-L5: Diffuse disc bulge, narrowed bilateral subarticular zones. Facet arthropathy, ligamentum flavum thickening, trace facet joint effusions, tiny synovial cyst in left posterolateral epidural space. Mild canal stenosis. Mild bilateral foraminal narrowing (right worse than left). L5-S1: Facet arthropathy, trace facet joint effusions. No significant canal stenosis or foraminal narrowing. The study was marked in EPIC for significant notification. EMG 11/17/2023 reveals chronic denervation most consistent with left S1 radiculopathy. Plan   Reviewed MRI lumbar imagining  Continue care with PM, consider facet joint injections , multilevel facet arthropathy , L 4 L5 synovial cyst seen on repeat MRI imagining. Left thigh symptoms likely related to iliotibial band syndrome--referral to PT    Reviewed red flag signs if develop go to ed for assessment. If symptoms fail to improve justine the office. Advised if  additional questions or concerns contact the office. Continue physical therapy HEP. Subjective: He presents as a referral from pain management Dr. Socorro Magana for lumbar spine assessment. Patient ID: Agustina Lacey is a 82 y.o. male PM/SH  DVT RLE 2022    treated with xarelto told vicente need life long, Left endoscopic CT release. Malignant neoplasm prostate, HTN, ED    HPI   This is an initial neurosurgery visit, he reports symptoms of midline lumbar sacral pain with distribution lateral leg over both buttocks and posterior leg into the left posterior lateral anterior thigh down to the left knee. Reports the onset of symptoms about 2016 after a fall. Reports he fell as he was taking the trash out onto his back, he thinks he was knocked out briefly. Reports he sustained injuries to have a torn left meniscus that eventually required orthopedic surgery about 9 months later after fall ( I cannot find record to support this dx,).   He reports the character of his symptoms as the left lateral thigh has intermittent numbness down to the knee, sharp pain in the anterior left thigh, and aching left lateral thigh. He reports trouble walking when pain is severe,  He reports his symptoms are aggravated by prolonged standing he will start with burning and lateral left thigh. He reports walking up stairs and any type of physical activity also aggravates his symptoms. Relieving factors he reports is sitting or lying in bed. He denies previous lumbar spine surgery. Multimodal Treatments[de-identified]  PT ---2 x week at [facility] stopped a couple of weeks ago , has enede. Helped a little , received  HEP doin  3 x per weeks. Attends the [facility] in [facility]   3 x per weeks and incorporates HEP into the workout. PM --11/27/23 Left hip Joint Injection under fluoroscopic guidance. 10/29/2021 decreased pain few days a little. 10/29/21  RENETTA  L 4 L5, DX Lumbar foraminal stenosis with lumbar disc protrusion lumbar radiculitis . Hold all interventional treatments secondary to developed  blood clot  in right ankle of UNK etiology. He was started on Xarelto. Reports he was told by PCP will need medication for life. 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 .  Tylenol  in am and PM one pill 500 mg. Topical lidocaine --- salon walker  --temporary relief ,    Social:   Drives,    lives in [address],  He is   and lives w/ wife   children   2  ages  48, 48 , ---retireed ,   over 21 years retired  . Reports he has gained 60 lbs since group home. REVIEW OF SYSTEMS  Review of Systems   Constitutional: Negative. HENT: Negative. Eyes: Negative. Respiratory: Negative. Cardiovascular: Negative. Gastrointestinal: Negative. Endocrine: Negative. Genitourinary: Negative.     Musculoskeletal:  Positive for back pain (pain across the lower back more on the left side), gait problem (sometimes have touble walking when pain is severe) and myalgias (soreness down the thigh). Allergic/Immunologic: Negative. Neurological:  Positive for numbness (minor down the L leg at time). Negative for weakness. Meds/Allergies     Current Outpatient Medications   Medication Sig Dispense Refill    lisinopril (ZESTRIL) 5 mg tablet TAKE ONE BY MOUTH DAILY 90 tablet 3    Xarelto 20 MG tablet TAKE ONE TABLET (20 MG TOTAL) BY MOUTH DAILY WITH BREAKFAST 90 tablet 1    predniSONE 10 mg tablet Take according to titration schedule (Patient not taking: Reported on 10/17/2023) 36 tablet 0     No current facility-administered medications for this visit.        No Known Allergies    PAST HISTORY    Past Medical History:   Diagnosis Date    Arthritis     Bladder neck contracture     Cancer [facility])     ED (erectile dysfunction)     Frequency of urination     Hypercholesterolemia     Hypertension     17    Nocturia     Prostate cancer ([facility])     16    Stress incontinence        Past Surgical History:   Procedure Laterality Date    BLADDER SURGERY      Bladder Neck Restriction, SP Catheter    CARPAL TUNNEL RELEASE      CHOLECYSTECTOMY      CYSTOSTOMY W/ BLADDER DILATION      PROSTATECTOMY      Radical    URETHRAL SLING         Social History     Tobacco Use    Smoking status: Former     Packs/day: 1.50     Years: 12.00     Total pack years: 18.00     Types: Cigarettes     Start date: 58     Quit date: 0     Years since quittin.9    Smokeless tobacco: Never   Vaping Use    Vaping Use: Never used   Substance Use Topics    Alcohol use: No    Drug use: No       Family History   Problem Relation Age of Onset    Coronary artery disease Father         involving native coronary artery of native heart, angina presence unspecified       The following portions of the patient's history were reviewed and updated as appropriate: allergies, current medications, past family history, past medical history, past social history, past surgical history and problem list.      EXAM    Vitals:Blood pressure 110/78, pulse (!) 50, temperature 98.7 °F (37.1 °C), temperature source Temporal, resp. rate 20, height 5' 8" (1.727 m), weight 119 kg (263 lb), SpO2 95 %. ,Body mass index is 39.99 kg/m². Physical Exam  Vitals and nursing note reviewed. Constitutional:       General: He is not in acute distress. Appearance: Normal appearance. He is not ill-appearing, toxic-appearing or diaphoretic. Comments: Looks younger that stated age. Eyes:      General: No scleral icterus. Right eye: No discharge. Left eye: No discharge. Pulmonary:      Effort: Pulmonary effort is normal. No respiratory distress. Musculoskeletal:      Right lower leg: No edema. Left lower leg: No edema. Skin:     Coloration: Skin is not jaundiced or pale. Findings: No bruising, erythema, lesion or rash. Neurological:      Mental Status: He is alert. Mental status is at baseline. He is disoriented. Sensory: Sensory deficit present. Motor: No weakness. Coordination: Coordination normal.      Gait: Gait is intact.  Gait normal.      Deep Tendon Reflexes: Reflexes normal.   Psychiatric:         Mood and Affect: Mood normal.         Neurologic Exam     Mental Status   Level of consciousness: alert    Motor Exam   Muscle bulk: normal  Overall muscle tone: normal    Strength   Right iliopsoas: 5/5  Right quadriceps: 5/5  Left quadriceps: 5/5  Right hamstrin/5  Left hamstrin/5  Right glutei: 5/5  Left glutei: 5/5  Right anterior tibial: 5/5  Left anterior tibial: 5/5  Right posterior tibial: 5/5  Left posterior tibial: 5/5  Right gastroc: 5/5  Left gastroc: 5/5    Sensory Exam   Right leg light touch: decreased from ankle  Left leg light touch: decreased from ankle    Gait, Coordination, and Reflexes     Gait  Gait: normal      Imaging Studies  MRI lumbar spine w wo contrast--Result Date: 12/8/2023  Narrative: MRI LUMBAR SPINE WITH AND WITHOUT CONTRAST INDICATION: R93.7: Abnormal findings on diagnostic imaging of other parts of musculoskeletal system. COMPARISON: MRI dated 9/30/2023. TECHNIQUE:  Multiplanar, multisequence imaging of the lumbar spine was performed before and after gadolinium administration. . IV Contrast:  12 mL of Gadobutrol injection (SINGLE-DOSE) IMAGE QUALITY:  Diagnostic FINDINGS: VERTEBRAL BODIES:  There are 5 lumbar type vertebral bodies. Mild lumbar dextroscoliosis. No subluxation. Vertebral height maintained. Redemonstration of subacute Schmorl's node at the inferior endplate of L2 with enhancing marrow edema. . Mild Modic type  I endplate degenerative changes at the superior endplate of L3. Hemangioma in the L1 vertebral body. No suspicious marrow signal abnormality. SACRUM:  Normal signal within the sacrum. No evidence of insufficiency or stress fracture. DISTAL CORD AND CONUS:  Normal size and signal within the distal cord and conus. PARASPINAL SOFT TISSUES:  Paraspinal soft tissues are unremarkable. LOWER THORACIC DISC SPACES:  Normal disc height and signal.  No disc herniation, canal stenosis or foraminal narrowing. LUMBAR DISC SPACES: L1-L2: Small disc bulge. No significant canal or foraminal stenosis. L2-L3: Disc bulge and facet arthropathy. No significant canal or foraminal stenosis. L3-L4: Disc bulge and facet arthropathy. Mild canal and mild foraminal stenosis. L4-L5: Disc bulge and facet arthropathy. Mild canal and mild foraminal stenosis. L5-S1: Minimal disc bulge with annular fissure. Facet arthropathy. No significant canal stenosis. Mild foraminal stenosis. POSTCONTRAST IMAGING: No abnormal intrathecal or epidural enhancement. OTHER FINDINGS:  None. Impression: Stable degenerative spondylosis. Redemonstration of subacute Schmorl's node inferior endplate of L2.  Workstation performed: [id number]     FL spine and pain procedure esult Date: 11/27/2023  Narrative: Procedure: Left hip Joint Injection under fluoroscopic guidance. 9/30/23 MRI LUMBAR SPINE WITHOUT CONTRAST     INDICATION: M54.16: Radiculopathy, lumbar region. COMPARISON: Lumbar spine radiograph 8/28/2023. TECHNIQUE:  Multiplanar, multisequence imaging of the lumbar spine was performed. .        IMAGE QUALITY:  Diagnostic     FINDINGS:     VERTEBRAL BODIES:  There are 5 lumbar type vertebral bodies. Minor dextroscoliosis of lumbar spine. No spondylolysis or spondylolisthesis. No compression fracture. Prominent focal bone marrow edema along the inferior endplate of L2 vertebral body, indeterminate but may represent acute/subacute Schmorl's node. Type I Modic endplate change along anterior corner endplates of T4-H0. L5 intraosseous vertebral body hemangioma. Otherwise, normal bone marrow signal.     SACRUM:  Normal signal within the sacrum. No evidence of insufficiency or stress fracture. DISTAL CORD AND CONUS:  Normal size and signal within the distal cord and conus. PARASPINAL SOFT TISSUES:  Paraspinal soft tissues are unremarkable. LOWER THORACIC DISC SPACES:  Normal disc height and signal.  No disc herniation, canal stenosis or foraminal narrowing. LUMBAR DISC SPACES: Multilevel osteophytes, disc height loss, and facet arthropathy. L1-L2: Mild diffuse disc bulge. No significant canal stenosis or foraminal narrowing. L2-L3: Mild diffuse disc bulge. Facet arthropathy, ligamentum flavum thickening, trace facet joint effusions. No significant canal stenosis or foraminal narrowing. L3-L4: Diffuse disc bulge. Facet arthropathy, ligamentum flavum thickening. Mild canal stenosis. Mild bilateral foraminal narrowing. L4-L5: Diffuse disc bulge, narrowed bilateral subarticular zones. Facet arthropathy, ligamentum flavum thickening, trace facet joint effusions, tiny synovial cyst in left posterolateral epidural space. Mild canal stenosis.  Mild bilateral foraminal narrowing (right worse than left). L5-S1: Facet arthropathy, trace facet joint effusions. No significant canal stenosis or foraminal narrowing. OTHER FINDINGS:  None. IMPRESSION:   Prominent focal bone marrow edema along inferior endplate of L2 vertebral body, indeterminate but may represent acute/subacute Schmorl's node. Recommend follow-up MRI lumbar spine with and without contrast in 6-12 weeks given underlying T1 hypointense   bone marrow signal.     Multilevel degenerative changes of lumbar spine with varying degrees of canal stenosis (mild L3-L4 and L4-L5) and foraminal narrowing (mild bilateral L3-L4 and bilateral L4-L5), as detailed above. The study was marked in EPIC for significant notification. I have personally reviewed pertinent reports. and I have personally reviewed pertinent films in PACS    I have spent a total time of 45 minutes on 12/12/23 in caring for this patient including Diagnostic results, Risks and benefits of tx options, Instructions for management, Patient and family education, Importance of tx compliance, Risk factor reductions, Impressions, Counseling / Coordination of care, Documenting in the medical record, Reviewing / ordering tests, medicine, procedures  , Obtaining or reviewing history  , and Communicating with other healthcare professionals .

## 2023-12-31 ENCOUNTER — OFFICE VISIT (OUTPATIENT)
Dept: URGENT CARE | Facility: CLINIC | Age: 83
End: 2023-12-31
Payer: COMMERCIAL

## 2023-12-31 VITALS
TEMPERATURE: 98.8 F | DIASTOLIC BLOOD PRESSURE: 76 MMHG | HEART RATE: 64 BPM | SYSTOLIC BLOOD PRESSURE: 142 MMHG | RESPIRATION RATE: 18 BRPM | OXYGEN SATURATION: 95 %

## 2023-12-31 DIAGNOSIS — R05.9 COUGH, UNSPECIFIED TYPE: ICD-10-CM

## 2023-12-31 DIAGNOSIS — J06.9 VIRAL URI WITH COUGH: Primary | ICD-10-CM

## 2023-12-31 LAB
SARS-COV-2 AG UPPER RESP QL IA: NEGATIVE
VALID CONTROL: NORMAL

## 2023-12-31 PROCEDURE — 99213 OFFICE O/P EST LOW 20 MIN: CPT | Performed by: NURSE PRACTITIONER

## 2023-12-31 PROCEDURE — 87636 SARSCOV2 & INF A&B AMP PRB: CPT | Performed by: NURSE PRACTITIONER

## 2023-12-31 PROCEDURE — 87811 SARS-COV-2 COVID19 W/OPTIC: CPT | Performed by: NURSE PRACTITIONER

## 2023-12-31 RX ORDER — BENZONATATE 100 MG/1
200 CAPSULE ORAL 3 TIMES DAILY PRN
Qty: 20 CAPSULE | Refills: 0 | Status: SHIPPED | OUTPATIENT
Start: 2023-12-31 | End: 2024-01-03 | Stop reason: SDUPTHER

## 2023-12-31 RX ORDER — FLUTICASONE PROPIONATE 50 MCG
2 SPRAY, SUSPENSION (ML) NASAL DAILY
Qty: 9.9 ML | Refills: 0 | Status: SHIPPED | OUTPATIENT
Start: 2023-12-31 | End: 2024-01-30

## 2023-12-31 NOTE — PROGRESS NOTES
[facility]        NAME: Ayad Brambila is a 83 y.o. male  : 1940    MRN: [id number]  DATE: 2023  TIME: 9:14 AM    Assessment and Plan   Viral URI with cough [J06.9]  1. Viral URI with cough  fluticasone (FLONASE) 50 mcg/act nasal spray    Covid/Flu-Office Collect      2. Cough, unspecified type  Poct Covid 19 Rapid Antigen Test    benzonatate (TESSALON PERLES) 100 mg capsule        Acute symptomatic x 3 to 4 days not responding conservative treatment POCT rapid strep was negative will send COVID flu.  Educated most likely viral in origin will start Flonase 2 sprays each nostril once daily and Tessalon Perles 3 times daily as needed for cough educated follow-up with primary care with worsening symptoms no improvement    Patient Instructions       Follow up with PCP in 3-5 days.  Proceed to  ER if symptoms worsen.    Chief Complaint     Chief Complaint   Patient presents with   • Cough     Pt states he started with a cough and sore throat a few days ago. No known fevers.          History of Present Illness       Patient is a 83-year-old male arrives with complaints of 3 to 4 days of cold-like symptoms low-grade fever cough sore throat rhinorrhea.  Denies nausea vomiting diarrhea shortness of breath chest pain.  POCT rapid COVID was negative in office.        Review of Systems   Review of Systems   Constitutional:  Positive for fatigue and fever. Negative for activity change, appetite change and chills.   HENT:  Positive for congestion, postnasal drip, sinus pressure and sinus pain. Negative for rhinorrhea and sore throat.    Respiratory:  Positive for cough. Negative for chest tightness and shortness of breath.    Gastrointestinal:  Negative for constipation, diarrhea, nausea and vomiting.   Musculoskeletal:  Negative for myalgias.   Skin:  Negative for color change, pallor and rash.   Neurological:  Negative for dizziness, syncope, weakness, light-headedness and headaches.    Hematological:  Negative for adenopathy.   Psychiatric/Behavioral:  Negative for agitation and confusion.          Current Medications       Current Outpatient Medications:   •  benzonatate (TESSALON PERLES) 100 mg capsule, Take 2 capsules (200 mg total) by mouth 3 (three) times a day as needed for cough, Disp: 20 capsule, Rfl: 0  •  fluticasone (FLONASE) 50 mcg/act nasal spray, 2 sprays into each nostril daily, Disp: 9.9 mL, Rfl: 0  •  lisinopril (ZESTRIL) 5 mg tablet, TAKE ONE BY MOUTH DAILY, Disp: 90 tablet, Rfl: 3  •  Xarelto 20 MG tablet, TAKE ONE TABLET (20 MG TOTAL) BY MOUTH DAILY WITH BREAKFAST, Disp: 90 tablet, Rfl: 1  •  predniSONE 10 mg tablet, Take according to titration schedule (Patient not taking: Reported on 10/17/2023), Disp: 36 tablet, Rfl: 0    Current Allergies     Allergies as of 12/31/2023   • (No Known Allergies)            The following portions of the patient's history were reviewed and updated as appropriate: allergies, current medications, past family history, past medical history, past social history, past surgical history and problem list.     Past Medical History:   Diagnosis Date   • Arthritis    • Bladder neck contracture    • Cancer (HCC)    • ED (erectile dysfunction)    • Frequency of urination    • Hypercholesterolemia    • Hypertension     12/13/17   • Nocturia    • Prostate cancer (HCC)     12/8/16   • Stress incontinence        Past Surgical History:   Procedure Laterality Date   • BLADDER SURGERY      Bladder Neck Restriction, SP Catheter   • CARPAL TUNNEL RELEASE     • CHOLECYSTECTOMY     • CYSTOSTOMY W/ BLADDER DILATION     • PROSTATECTOMY      Radical   • URETHRAL SLING         Family History   Problem Relation Age of Onset   • Coronary artery disease Father         involving native coronary artery of native heart, angina presence unspecified         Medications have been verified.        Objective   /76   Pulse 64   Temp 98.8 °F (37.1 °C)   Resp 18   SpO2 95%    No LMP for male patient.       Physical Exam     Physical Exam  Vitals and nursing note reviewed.   Constitutional:       General: He is not in acute distress.     Appearance: Normal appearance. He is ill-appearing. He is not toxic-appearing or diaphoretic.   HENT:      Head: Normocephalic and atraumatic.      Right Ear: Tympanic membrane, ear canal and external ear normal. There is no impacted cerumen.      Left Ear: Tympanic membrane, ear canal and external ear normal. There is no impacted cerumen.      Nose: Congestion present. No rhinorrhea.      Mouth/Throat:      Mouth: Mucous membranes are moist.      Pharynx: Posterior oropharyngeal erythema present.   Eyes:      General: No scleral icterus.        Right eye: No discharge.         Left eye: No discharge.      Conjunctiva/sclera: Conjunctivae normal.   Cardiovascular:      Rate and Rhythm: Normal rate and regular rhythm.   Pulmonary:      Effort: Pulmonary effort is normal. No respiratory distress.      Breath sounds: Normal breath sounds. No stridor. No wheezing, rhonchi or rales.   Musculoskeletal:         General: Normal range of motion.      Cervical back: Normal range of motion.   Lymphadenopathy:      Cervical: Cervical adenopathy present.   Skin:     General: Skin is dry.   Neurological:      Mental Status: He is alert and oriented to person, place, and time.   Psychiatric:         Mood and Affect: Mood normal.         Behavior: Behavior normal.         Thought Content: Thought content normal.         Judgment: Judgment normal.

## 2024-01-01 LAB
FLUAV RNA RESP QL NAA+PROBE: NEGATIVE
FLUBV RNA RESP QL NAA+PROBE: NEGATIVE
SARS-COV-2 RNA RESP QL NAA+PROBE: NEGATIVE

## 2024-01-02 ENCOUNTER — TELEPHONE (OUTPATIENT)
Dept: URGENT CARE | Facility: CLINIC | Age: 84
End: 2024-01-02

## 2024-01-02 NOTE — TELEPHONE ENCOUNTER
Left message for patient of negative COVID flu result and should call office provided phone number if any questions.

## 2024-01-03 ENCOUNTER — TELEPHONE (OUTPATIENT)
Dept: FAMILY MEDICINE CLINIC | Facility: HOSPITAL | Age: 84
End: 2024-01-03

## 2024-01-03 DIAGNOSIS — R05.9 COUGH, UNSPECIFIED TYPE: ICD-10-CM

## 2024-01-03 RX ORDER — BENZONATATE 100 MG/1
200 CAPSULE ORAL 3 TIMES DAILY PRN
Qty: 20 CAPSULE | Refills: 0 | Status: SHIPPED | OUTPATIENT
Start: 2024-01-03

## 2024-01-03 NOTE — TELEPHONE ENCOUNTER
Per bg @ 936a    This is Ayad kim [id number], 25th of February February 1940. If I can get maybe some cough medicine prescribed to [facility] and [facility]. Thank you.

## 2024-01-03 NOTE — TELEPHONE ENCOUNTER
Please call.   Recommend he continue with tessalon perles as given at urgent care.   Also recommend try mucinex - DM otc for coughing.

## 2024-01-05 DIAGNOSIS — I10 ESSENTIAL HYPERTENSION: ICD-10-CM

## 2024-01-05 RX ORDER — LISINOPRIL 5 MG/1
TABLET ORAL
Qty: 90 TABLET | Refills: 3 | Status: SHIPPED | OUTPATIENT
Start: 2024-01-05

## 2024-01-09 ENCOUNTER — OFFICE VISIT (OUTPATIENT)
Dept: PAIN MEDICINE | Facility: CLINIC | Age: 84
End: 2024-01-09
Payer: COMMERCIAL

## 2024-01-09 VITALS
SYSTOLIC BLOOD PRESSURE: 144 MMHG | DIASTOLIC BLOOD PRESSURE: 82 MMHG | TEMPERATURE: 97.3 F | BODY MASS INDEX: 40.01 KG/M2 | WEIGHT: 264 LBS | HEIGHT: 68 IN | HEART RATE: 66 BPM

## 2024-01-09 DIAGNOSIS — M76.32 ILIOTIBIAL BAND SYNDROME OF LEFT SIDE: ICD-10-CM

## 2024-01-09 DIAGNOSIS — G57.12 MERALGIA PARAESTHETICA, LEFT: Primary | ICD-10-CM

## 2024-01-09 PROCEDURE — 99213 OFFICE O/P EST LOW 20 MIN: CPT | Performed by: PHYSICIAN ASSISTANT

## 2024-01-09 NOTE — PROGRESS NOTES
Assessment:  1. Meralgia paraesthetica, left    2. Iliotibial band syndrome of left side        Plan:  While the patient was in the office today, I did have a thorough conversation regarding their chronic pain syndrome, medication management, and treatment plan options.    At this point in time, he would like to try physical therapy prior to any interventional treatment such as a lateral femoral cutaneous nerve block.    Consider the addition of gabapentin.  Patient wishes to hold off on any medication at this time.    He will follow-up with us on a as needed basis if the pain changes or worsens.    The patient was advised to contact the office should their symptoms worsen in the interim. The patient was agreeable and verbalized an understanding.        History of Present Illness:    The patient is a 83 y.o. male last seen on 11/27/2023 who presents for a follow up office visit in regards to left leg pain.  The patient currently reports chronic pain in the left anterior lateral thigh that he presently rates a 3 out of 10 and describes it as an intermittent burning, dull and aching pain that is worse with standing.  He also reports numbness and hypersensitivity around the lateral thigh.  He has history of chronic low back pain but reports this is very minor and certainly not severe enough to warrant injection therapy.  In November he underwent a left hip joint injection and is able to report significant relief in that particular region.  He had a consultation with neurosurgery who felt his pain was related to IT band syndrome and sent him to physical therapy.  Unfortunately he developed an upper respiratory infection therefore therapy has been on hold until he feels better.    I have personally reviewed and/or updated the patient's past medical history, past surgical history, family history, social history, current medications, allergies, and vital signs today.       Review of Systems:    Review of Systems    Respiratory:  Negative for shortness of breath.    Cardiovascular:  Negative for chest pain.   Gastrointestinal:  Negative for constipation, diarrhea, nausea and vomiting.   Musculoskeletal:  Negative for arthralgias, gait problem, joint swelling (Joint stiffness) and myalgias.   Skin:  Negative for rash.   Neurological:  Negative for dizziness, seizures and weakness.   All other systems reviewed and are negative.        Past Medical History:   Diagnosis Date   • Arthritis    • Bladder neck contracture    • Cancer (HCC)    • ED (erectile dysfunction)    • Frequency of urination    • Hypercholesterolemia    • Hypertension     17   • Nocturia    • Prostate cancer (HCC)     16   • Stress incontinence        Past Surgical History:   Procedure Laterality Date   • BLADDER SURGERY      Bladder Neck Restriction, SP Catheter   • CARPAL TUNNEL RELEASE     • CHOLECYSTECTOMY     • CYSTOSTOMY W/ BLADDER DILATION     • PROSTATECTOMY      Radical   • URETHRAL SLING         Family History   Problem Relation Age of Onset   • Coronary artery disease Father         involving native coronary artery of native heart, angina presence unspecified       Social History     Occupational History   • Not on file   Tobacco Use   • Smoking status: Former     Current packs/day: 0.00     Average packs/day: 1.5 packs/day for 12.0 years (18.0 ttl pk-yrs)     Types: Cigarettes     Start date:      Quit date:      Years since quittin.0   • Smokeless tobacco: Never   Vaping Use   • Vaping status: Never Used   Substance and Sexual Activity   • Alcohol use: No   • Drug use: No   • Sexual activity: Not on file         Current Outpatient Medications:   •  fluticasone (FLONASE) 50 mcg/act nasal spray, 2 sprays into each nostril daily, Disp: 9.9 mL, Rfl: 0  •  lisinopril (ZESTRIL) 5 mg tablet, TAKE ONE BY MOUTH DAILY, Disp: 90 tablet, Rfl: 3  •  Xarelto 20 MG tablet, TAKE ONE TABLET (20 MG TOTAL) BY MOUTH DAILY WITH BREAKFAST, Disp:  "90 tablet, Rfl: 1  •  benzonatate (TESSALON PERLES) 100 mg capsule, Take 2 capsules (200 mg total) by mouth 3 (three) times a day as needed for cough (Patient not taking: Reported on 1/9/2024), Disp: 20 capsule, Rfl: 0  •  predniSONE 10 mg tablet, Take according to titration schedule (Patient not taking: Reported on 10/17/2023), Disp: 36 tablet, Rfl: 0    No Known Allergies    Physical Exam:    /82 (BP Location: Left arm, Patient Position: Sitting, Cuff Size: Large)   Pulse 66   Temp (!) 97.3 °F (36.3 °C)   Ht 5' 8\" (1.727 m)   Wt 120 kg (264 lb)   BMI 40.14 kg/m²     Constitutional:normal, well developed, well nourished, alert, in no distress and non-toxic and no overt pain behavior. and overweight  Eyes:anicteric  HEENT:grossly intact  Pulmonary:even and unlabored  Cardiovascular:No edema or pitting edema present  Skin:Normal without rashes or lesions and well hydrated  Psychiatric:Mood and affect appropriate  Neurologic:Cranial Nerves II-XII grossly intact  Musculoskeletal:normal      Imaging  No orders to display         No orders of the defined types were placed in this encounter.      "

## 2024-02-19 DIAGNOSIS — I82.4Y1 ACUTE DEEP VEIN THROMBOSIS (DVT) OF PROXIMAL VEIN OF RIGHT LOWER EXTREMITY (HCC): ICD-10-CM

## 2024-02-20 RX ORDER — RIVAROXABAN 20 MG/1
TABLET, FILM COATED ORAL
Qty: 90 TABLET | Refills: 0 | Status: SHIPPED | OUTPATIENT
Start: 2024-02-20

## 2024-02-20 NOTE — TELEPHONE ENCOUNTER
Patient is calling again to find out the status of this refill.    He stated that he got an email from [employer] stating that they have not been able to reach us.    Their phone # is [phone]    Please call patient to advise

## 2024-02-20 NOTE — TELEPHONE ENCOUNTER
Requested medication(s) are due for refill today: Yes  Patient has already received a courtesy refill: No  Other reason request has been forwarded to provider: are you able to complete refill.

## 2024-03-13 ENCOUNTER — APPOINTMENT (OUTPATIENT)
Dept: LAB | Facility: HOSPITAL | Age: 84
End: 2024-03-13
Payer: COMMERCIAL

## 2024-03-13 DIAGNOSIS — I10 ESSENTIAL HYPERTENSION: ICD-10-CM

## 2024-03-13 LAB
ALBUMIN SERPL BCP-MCNC: 4 G/DL (ref 3.5–5)
ALP SERPL-CCNC: 53 U/L (ref 34–104)
ALT SERPL W P-5'-P-CCNC: 18 U/L (ref 7–52)
ANION GAP SERPL CALCULATED.3IONS-SCNC: 8 MMOL/L (ref 4–13)
AST SERPL W P-5'-P-CCNC: 16 U/L (ref 13–39)
BILIRUB SERPL-MCNC: 1.02 MG/DL (ref 0.2–1)
BUN SERPL-MCNC: 16 MG/DL (ref 5–25)
CALCIUM SERPL-MCNC: 9.2 MG/DL (ref 8.4–10.2)
CHLORIDE SERPL-SCNC: 104 MMOL/L (ref 96–108)
CHOLEST SERPL-MCNC: 174 MG/DL
CO2 SERPL-SCNC: 27 MMOL/L (ref 21–32)
CREAT SERPL-MCNC: 1.1 MG/DL (ref 0.6–1.3)
ERYTHROCYTE [DISTWIDTH] IN BLOOD BY AUTOMATED COUNT: 12.4 % (ref 11.6–15.1)
GFR SERPL CREATININE-BSD FRML MDRD: 62 ML/MIN/1.73SQ M
GLUCOSE P FAST SERPL-MCNC: 96 MG/DL (ref 65–99)
HCT VFR BLD AUTO: 44.8 % (ref 36.5–49.3)
HDLC SERPL-MCNC: 41 MG/DL
HGB BLD-MCNC: 15.3 G/DL (ref 12–17)
LDLC SERPL CALC-MCNC: 113 MG/DL (ref 0–100)
MCH RBC QN AUTO: 32.3 PG (ref 26.8–34.3)
MCHC RBC AUTO-ENTMCNC: 34.2 G/DL (ref 31.4–37.4)
MCV RBC AUTO: 95 FL (ref 82–98)
PLATELET # BLD AUTO: 185 THOUSANDS/UL (ref 149–390)
PMV BLD AUTO: 10.1 FL (ref 8.9–12.7)
POTASSIUM SERPL-SCNC: 4.7 MMOL/L (ref 3.5–5.3)
PROT SERPL-MCNC: 6.4 G/DL (ref 6.4–8.4)
RBC # BLD AUTO: 4.73 MILLION/UL (ref 3.88–5.62)
SODIUM SERPL-SCNC: 139 MMOL/L (ref 135–147)
TRIGL SERPL-MCNC: 100 MG/DL
TSH SERPL DL<=0.05 MIU/L-ACNC: 2.45 UIU/ML (ref 0.45–4.5)
WBC # BLD AUTO: 4.74 THOUSAND/UL (ref 4.31–10.16)

## 2024-03-13 PROCEDURE — 80061 LIPID PANEL: CPT

## 2024-03-13 PROCEDURE — 85027 COMPLETE CBC AUTOMATED: CPT

## 2024-03-13 PROCEDURE — 84443 ASSAY THYROID STIM HORMONE: CPT

## 2024-03-13 PROCEDURE — 36415 COLL VENOUS BLD VENIPUNCTURE: CPT

## 2024-03-13 PROCEDURE — 80053 COMPREHEN METABOLIC PANEL: CPT

## 2024-03-15 ENCOUNTER — TELEPHONE (OUTPATIENT)
Dept: ADMINISTRATIVE | Facility: OTHER | Age: 84
End: 2024-03-15

## 2024-03-15 NOTE — TELEPHONE ENCOUNTER
03/15/24 2:31 PM    Patient contacted (left message) to bring Advance Directive, POLST, or Living Will document to next scheduled pcp visit.    Thank you.  Ashlyn Morse  [facility]

## 2024-03-18 ENCOUNTER — OFFICE VISIT (OUTPATIENT)
Dept: FAMILY MEDICINE CLINIC | Facility: HOSPITAL | Age: 84
End: 2024-03-18
Payer: COMMERCIAL

## 2024-03-18 VITALS
OXYGEN SATURATION: 96 % | DIASTOLIC BLOOD PRESSURE: 70 MMHG | WEIGHT: 270 LBS | TEMPERATURE: 97.6 F | SYSTOLIC BLOOD PRESSURE: 140 MMHG | BODY MASS INDEX: 40.92 KG/M2 | HEIGHT: 68 IN | HEART RATE: 68 BPM

## 2024-03-18 DIAGNOSIS — I10 ESSENTIAL HYPERTENSION: ICD-10-CM

## 2024-03-18 DIAGNOSIS — G57.12 MERALGIA PARAESTHETICA, LEFT: ICD-10-CM

## 2024-03-18 DIAGNOSIS — I82.4Y1 DEEP VEIN THROMBOSIS (DVT) OF PROXIMAL VEIN OF RIGHT LOWER EXTREMITY, UNSPECIFIED CHRONICITY (HCC): Primary | ICD-10-CM

## 2024-03-18 DIAGNOSIS — Z85.46 PERSONAL HISTORY OF PROSTATE CANCER: ICD-10-CM

## 2024-03-18 DIAGNOSIS — E66.01 OBESITY, MORBID (HCC): ICD-10-CM

## 2024-03-18 DIAGNOSIS — C61 PROSTATE CANCER (HCC): ICD-10-CM

## 2024-03-18 PROCEDURE — 99214 OFFICE O/P EST MOD 30 MIN: CPT | Performed by: FAMILY MEDICINE

## 2024-03-18 PROCEDURE — G2211 COMPLEX E/M VISIT ADD ON: HCPCS | Performed by: FAMILY MEDICINE

## 2024-03-18 NOTE — PROGRESS NOTES
Name: Ayad Brambila      : 1940      MRN: [id number]  Encounter Provider: Narinder Renteria MD  Encounter Date: 3/18/2024   Encounter department: [facility] CARE [address]     Assessment & Plan     1. Deep vein thrombosis (DVT) of proximal vein of right lower extremity, unspecified chronicity (HCC)  Continue with xarelto. Lifetime.   2. Essential hypertension  Stable. Reasonable control. Continue with liisnopril 5 mg daily.   3. Meralgia paraesthetica, left  Discussed diagnosis and looser fitting waist line  4. Personal history of prostate cancer    5. Prostate cancer (HCC)  Cotninue fu with urology. Psa has been low.   6. Obesity, morbid (HCC)  Work on dietar control and exercise   7. ITB . Has referral to PT. Discussed diagnosis and treatment.          Subjective      Ayad is here for follow-up of DVT, history of prostate cancer, hypertension, in the meantime he also has been seen for meralgia paresthetica and IT band syndrome.  He has a referral to her physical therapy but has not yet made that appointment.  Otherwise he has been doing well and no issues with medications.      Review of Systems   Constitutional: Negative.  Negative for activity change, appetite change, chills and diaphoresis.   HENT:  Negative for congestion and dental problem.    Respiratory: Negative.  Negative for apnea, chest tightness, shortness of breath and wheezing.    Cardiovascular: Negative.  Negative for chest pain, palpitations and leg swelling.   Gastrointestinal: Negative.  Negative for abdominal distention, abdominal pain, constipation, diarrhea and nausea.   Genitourinary: Negative.  Negative for difficulty urinating, dysuria and frequency.       Current Outpatient Medications on File Prior to Visit   Medication Sig   • lisinopril (ZESTRIL) 5 mg tablet TAKE ONE BY MOUTH DAILY   • rivaroxaban (Xarelto) 20 mg tablet TAKE ONE TABLET (20 MG TOTAL) BY MOUTH DAILY WITH BREAKFAST   • fluticasone  "(FLONASE) 50 mcg/act nasal spray 2 sprays into each nostril daily   • [DISCONTINUED] benzonatate (TESSALON PERLES) 100 mg capsule Take 2 capsules (200 mg total) by mouth 3 (three) times a day as needed for cough (Patient not taking: Reported on 1/9/2024)   • [DISCONTINUED] predniSONE 10 mg tablet Take according to titration schedule (Patient not taking: Reported on 10/17/2023)       Objective     /70   Pulse 68   Temp 97.6 °F (36.4 °C)   Ht 5' 8\" (1.727 m)   Wt 122 kg (270 lb)   SpO2 96%   BMI 41.05 kg/m²     Physical Exam  Vitals reviewed.   Constitutional:       General: He is not in acute distress.     Appearance: Normal appearance. He is well-developed.   HENT:      Head: Normocephalic and atraumatic.      Right Ear: External ear normal.      Left Ear: External ear normal.      Nose: Nose normal.      Mouth/Throat:      Mouth: Mucous membranes are moist.   Eyes:      Conjunctiva/sclera: Conjunctivae normal.      Pupils: Pupils are equal, round, and reactive to light.   Cardiovascular:      Rate and Rhythm: Normal rate and regular rhythm.      Heart sounds: Normal heart sounds. No murmur heard.     No friction rub. No gallop.   Pulmonary:      Effort: Pulmonary effort is normal. No respiratory distress.      Breath sounds: Normal breath sounds. No wheezing or rales.   Chest:      Chest wall: No tenderness.   Abdominal:      General: Bowel sounds are normal. There is no distension.      Palpations: Abdomen is soft. There is no mass.      Tenderness: There is no abdominal tenderness. There is no guarding or rebound.   Musculoskeletal:         General: Normal range of motion.      Cervical back: Normal range of motion and neck supple.      Comments: Tenderness along the left ITB band.   Skin:     General: Skin is warm.   Neurological:      Mental Status: He is alert and oriented to person, place, and time.   Psychiatric:         Mood and Affect: Mood normal.         Behavior: Behavior normal.         " Thought Content: Thought content normal.         Judgment: Judgment normal.       Narinder Renteria MD

## 2024-05-08 ENCOUNTER — TELEPHONE (OUTPATIENT)
Age: 84
End: 2024-05-08

## 2024-05-08 NOTE — TELEPHONE ENCOUNTER
Please call.    I believe he gets the catheters through Urology and has been managed by them.   Need the information from them.

## 2024-05-08 NOTE — TELEPHONE ENCOUNTER
Rachel from [facility] calling in regards to fax sent over on 5/6. Stated pt is requesting catheters through his insurance, and they need a prescription and chart notes.

## 2024-05-08 NOTE — TELEPHONE ENCOUNTER
Spoke to Rachel from [facility], advised urology handles this, they will reach out to them, any issues they will call us back.

## 2024-05-19 DIAGNOSIS — I82.4Y1 ACUTE DEEP VEIN THROMBOSIS (DVT) OF PROXIMAL VEIN OF RIGHT LOWER EXTREMITY (HCC): ICD-10-CM

## 2024-05-19 RX ORDER — RIVAROXABAN 20 MG/1
TABLET, FILM COATED ORAL
Qty: 90 TABLET | Refills: 1 | Status: SHIPPED | OUTPATIENT
Start: 2024-05-19

## 2024-05-20 ENCOUNTER — TELEPHONE (OUTPATIENT)
Age: 84
End: 2024-05-20

## 2024-05-20 NOTE — TELEPHONE ENCOUNTER
Patient warm transferred to me. Just wanted to know if his script was sent in. Advised that script was sent yesterday to his pharmacy. Confirmed next appt with Dr. Menon. Patient verbalized understanding.

## 2024-06-21 ENCOUNTER — OFFICE VISIT (OUTPATIENT)
Dept: FAMILY MEDICINE CLINIC | Facility: HOSPITAL | Age: 84
End: 2024-06-21

## 2024-06-21 ENCOUNTER — TELEPHONE (OUTPATIENT)
Dept: FAMILY MEDICINE CLINIC | Facility: HOSPITAL | Age: 84
End: 2024-06-21

## 2024-06-21 ENCOUNTER — HOSPITAL ENCOUNTER (OUTPATIENT)
Dept: NON INVASIVE DIAGNOSTICS | Facility: HOSPITAL | Age: 84
Discharge: HOME | End: 2024-06-21
Payer: COMMERCIAL

## 2024-06-21 VITALS
SYSTOLIC BLOOD PRESSURE: 133 MMHG | BODY MASS INDEX: 40.65 KG/M2 | OXYGEN SATURATION: 96 % | WEIGHT: 268.2 LBS | HEART RATE: 64 BPM | HEIGHT: 68 IN | TEMPERATURE: 98 F | DIASTOLIC BLOOD PRESSURE: 81 MMHG

## 2024-06-21 DIAGNOSIS — R60.0 LOCALIZED EDEMA: ICD-10-CM

## 2024-06-21 DIAGNOSIS — M79.89 RIGHT LEG SWELLING: ICD-10-CM

## 2024-06-21 DIAGNOSIS — M25.561 ACUTE PAIN OF RIGHT KNEE: Primary | ICD-10-CM

## 2024-06-21 DIAGNOSIS — Z79.01 CHRONIC ANTICOAGULATION: ICD-10-CM

## 2024-06-21 PROCEDURE — 93971 EXTREMITY STUDY: CPT

## 2024-06-21 PROCEDURE — 93971 EXTREMITY STUDY: CPT | Performed by: SURGERY

## 2024-06-21 NOTE — PROGRESS NOTES
Ambulatory Visit  Name: Ayad Brambila      : 1940      MRN: [id number]  Encounter Provider: DAREN Mcdonald  Encounter Date: 2024   Encounter department: [facility] [address]     Assessment & Plan   1. Acute pain of right knee  2. Right leg swelling  -     VAS VENOUS DUPLEX -LOWER LIMB UNILATERAL; Future; Expected date: 2024  3. Localized edema  -     VAS VENOUS DUPLEX -LOWER LIMB UNILATERAL; Future; Expected date: 2024  4. Chronic anticoagulation      DVT doubtful given compliance with chronic anticoagulation, but will r/o w/STAT venous doppler  If negative, advise he consider pursuing PT and/or consult with ortho       History of Present Illness         Recently changed leg exercises at the gym to help strengthen his thighs. He has had right knee pain for a couple weeks but worse for the past week. Lower leg has been swollen.   Hx of blood clot below right ankle 2 years ago and on xarelto. Takes regularly as prescribed.         Review of Systems   Cardiovascular:  Positive for leg swelling (right leg - worse than usual).   Musculoskeletal:  Positive for arthralgias and gait problem (due to knee pain).       Past Medical History:   Diagnosis Date    Arthritis     Bladder neck contracture     Cancer (HCC)     ED (erectile dysfunction)     Frequency of urination     Hypercholesterolemia     Hypertension     17    Nocturia     Prostate cancer (HCC)     16    Stress incontinence      Past Surgical History:   Procedure Laterality Date    BLADDER SURGERY      Bladder Neck Restriction, SP Catheter    CARPAL TUNNEL RELEASE      CHOLECYSTECTOMY      CYSTOSTOMY W/ BLADDER DILATION      PROSTATECTOMY      Radical    URETHRAL SLING       Family History   Problem Relation Age of Onset    Coronary artery disease Father         involving native coronary artery of native heart, angina presence unspecified     Social History     Tobacco Use    Smoking status: Former  "    Current packs/day: 0.00     Average packs/day: 1.5 packs/day for 12.0 years (18.0 ttl pk-yrs)     Types: Cigarettes     Start date:      Quit date: 1972     Years since quittin.5    Smokeless tobacco: Never   Vaping Use    Vaping status: Never Used   Substance and Sexual Activity    Alcohol use: No    Drug use: No    Sexual activity: Not on file     Current Outpatient Medications on File Prior to Visit   Medication Sig    lisinopril (ZESTRIL) 5 mg tablet TAKE ONE BY MOUTH DAILY    rivaroxaban (Xarelto) 20 mg tablet TAKE ONE TABLET (20 MG TOTAL) BY MOUTH DAILY WITH BREAKFAST    fluticasone (FLONASE) 50 mcg/act nasal spray 2 sprays into each nostril daily     No Known Allergies  Immunization History   Administered Date(s) Administered    COVID-19 MODERNA VACC 0.5 ML IM 2021, 2021, 2021, 2022    COVID-19 Moderna Vac BIVALENT 12 Yr+ IM 0.5 ML 2023    Influenza, high dose seasonal 0.7 mL 2020, 2021    Pneumococcal Conjugate 13-Valent 2017    Pneumococcal Polysaccharide PPV23 2007     Objective     /81 (BP Location: Left arm, Patient Position: Sitting, Cuff Size: Large)   Pulse 64   Temp 98 °F (36.7 °C) (Tympanic)   Ht 5' 8\" (1.727 m)   Wt 122 kg (268 lb 3.2 oz)   SpO2 96%   BMI 40.78 kg/m²       Physical Exam  Vitals reviewed.   Constitutional:       General: He is not in acute distress.     Appearance: Normal appearance. He is obese.   HENT:      Head: Normocephalic.   Pulmonary:      Effort: Pulmonary effort is normal. No respiratory distress.   Musculoskeletal:      Right knee: Crepitus present. No swelling, deformity, erythema or ecchymosis. Normal range of motion. Tenderness present over the medial joint line.      Right lower leg: Edema (+2 pitting) present.      Left lower leg: No edema.   Skin:     General: Skin is warm and dry.   Neurological:      General: No focal deficit present.      Mental Status: He is alert and oriented to " person, place, and time.      Gait: Gait abnormal (limping).   Psychiatric:         Mood and Affect: Mood normal.         Behavior: Behavior normal.       Administrative Statements   I have spent a total time of 15 minutes on 06/21/24 In caring for this patient including Risks and benefits of tx options, Instructions for management, Patient and family education, Risk factor reductions, Impressions, Counseling / Coordination of care, Documenting in the medical record, Reviewing / ordering tests, medicine, procedures  , and Obtaining or reviewing history  .

## 2024-06-21 NOTE — TELEPHONE ENCOUNTER
Left detailed message that doppler was negative(going into weekend, wanted pt to be aware), advised pt to call our office for next step.

## 2024-06-24 DIAGNOSIS — M25.561 ACUTE PAIN OF RIGHT KNEE: Primary | ICD-10-CM

## 2024-06-26 ENCOUNTER — APPOINTMENT (OUTPATIENT)
Dept: RADIOLOGY | Facility: CLINIC | Age: 84
End: 2024-06-26
Payer: COMMERCIAL

## 2024-06-26 ENCOUNTER — OFFICE VISIT (OUTPATIENT)
Dept: OBGYN CLINIC | Facility: CLINIC | Age: 84
End: 2024-06-26
Payer: COMMERCIAL

## 2024-06-26 VITALS
HEIGHT: 68 IN | DIASTOLIC BLOOD PRESSURE: 80 MMHG | BODY MASS INDEX: 41.37 KG/M2 | SYSTOLIC BLOOD PRESSURE: 124 MMHG | WEIGHT: 273 LBS

## 2024-06-26 DIAGNOSIS — M25.561 RIGHT KNEE PAIN, UNSPECIFIED CHRONICITY: ICD-10-CM

## 2024-06-26 DIAGNOSIS — M25.561 ACUTE PAIN OF RIGHT KNEE: ICD-10-CM

## 2024-06-26 DIAGNOSIS — M17.11 PRIMARY OSTEOARTHRITIS OF RIGHT KNEE: Primary | ICD-10-CM

## 2024-06-26 DIAGNOSIS — M25.461 EFFUSION OF RIGHT KNEE: ICD-10-CM

## 2024-06-26 PROCEDURE — 73564 X-RAY EXAM KNEE 4 OR MORE: CPT

## 2024-06-26 PROCEDURE — 99204 OFFICE O/P NEW MOD 45 MIN: CPT | Performed by: ORTHOPAEDIC SURGERY

## 2024-06-26 PROCEDURE — 20610 DRAIN/INJ JOINT/BURSA W/O US: CPT | Performed by: ORTHOPAEDIC SURGERY

## 2024-06-26 RX ORDER — BETAMETHASONE SODIUM PHOSPHATE AND BETAMETHASONE ACETATE 3; 3 MG/ML; MG/ML
6 INJECTION, SUSPENSION INTRA-ARTICULAR; INTRALESIONAL; INTRAMUSCULAR; SOFT TISSUE
Status: COMPLETED | OUTPATIENT
Start: 2024-06-26 | End: 2024-06-26

## 2024-06-26 RX ORDER — LIDOCAINE HYDROCHLORIDE 10 MG/ML
7 INJECTION, SOLUTION EPIDURAL; INFILTRATION; INTRACAUDAL; PERINEURAL
Status: COMPLETED | OUTPATIENT
Start: 2024-06-26 | End: 2024-06-26

## 2024-06-26 RX ADMIN — BETAMETHASONE SODIUM PHOSPHATE AND BETAMETHASONE ACETATE 6 MG: 3; 3 INJECTION, SUSPENSION INTRA-ARTICULAR; INTRALESIONAL; INTRAMUSCULAR; SOFT TISSUE at 08:30

## 2024-06-26 RX ADMIN — LIDOCAINE HYDROCHLORIDE 7 ML: 10 INJECTION, SOLUTION EPIDURAL; INFILTRATION; INTRACAUDAL; PERINEURAL at 08:30

## 2024-06-26 NOTE — PROGRESS NOTES
Assessment:     1. Primary osteoarthritis of right knee    2. Effusion of right knee    3. Right knee pain, unspecified chronicity    4. Acute pain of right knee        Plan:     Problem List Items Addressed This Visit          Musculoskeletal and Integument    Primary osteoarthritis of right knee - Primary    Relevant Medications    lidocaine (PF) (XYLOCAINE-MPF) 1 % injection 7 mL (Completed)    betamethasone acetate-betamethasone sodium phosphate (CELESTONE) injection 6 mg (Completed)    Other Relevant Orders    Large joint arthrocentesis: R knee (Completed)    Effusion of right knee    Relevant Medications    lidocaine (PF) (XYLOCAINE-MPF) 1 % injection 7 mL (Completed)    betamethasone acetate-betamethasone sodium phosphate (CELESTONE) injection 6 mg (Completed)    Other Relevant Orders    Large joint arthrocentesis: R knee (Completed)     Other Visit Diagnoses       Right knee pain, unspecified chronicity        Relevant Orders    XR knee 4+ vw right injury    Acute pain of right knee             Findings consistent with right knee mild arthritis with effusion. Imaging and prognosis reviewed with patient. Patient aggravated underlying arthritis of knee with lunges at gym. Aspirated 10cc of yellow fluid from knee followed by cortisone injection, tolerated procedure well, cold compress today. Avoid high impact, lunges, squats, and climbing at gym. Stationary bike, elliptical for low impact exercise. Tylenol for pain as on Xarelto. Also add voltaren gel or aspercreme. Repeat CSI every 3-4 months as needed. See back in 2-3 months. All patient's questions were answered to his satisfaction.  This note is created using dictation transcription.  It may contain typographical errors, grammatical errors, improperly dictated words, background noise and other errors.    Subjective:     Patient ID: Ayad Brambila is a 84 y.o. male.  Chief Complaint:  84 yr old male in for evaluation of right knee pain. Referred by DAREN  Art. He states he was at the gym last week performing rehab exercises for left leg. He decided to add lunges into his routine. No issues while performing the exercise but when he got home and tried to get out of the car his knee was very stiff and painful. He also developed swelling in his knee. He states he continues to note pain anteromedial region with walking, standing, stairs, gardening activities. Swelling is impacting his motion. General ache, throbbing and stiffness but can be more sharp depending on activity. He denies any locking or garry instability. Only use tylenol for pain. No history of gout, lyme, rheumatoid.   Hx of blood clot below right ankle 2 years ago and on xarelto. Recently had VAS no blood clots.    Allergy:  No Known Allergies  Medications:  all current active meds have been reviewed  Past Medical History:  Past Medical History:   Diagnosis Date    Arthritis     Bladder neck contracture     Cancer (HCC)     ED (erectile dysfunction)     Frequency of urination     Hypercholesterolemia     Hypertension     12/13/17    Nocturia     Prostate cancer (HCC)     12/8/16    Stress incontinence      Past Surgical History:  Past Surgical History:   Procedure Laterality Date    BLADDER SURGERY      Bladder Neck Restriction, SP Catheter    CARPAL TUNNEL RELEASE      CHOLECYSTECTOMY      CYSTOSTOMY W/ BLADDER DILATION      PROSTATECTOMY      Radical    URETHRAL SLING       Family History:  Family History   Problem Relation Age of Onset    Coronary artery disease Father         involving native coronary artery of native heart, angina presence unspecified     Social History:  Social History     Substance and Sexual Activity   Alcohol Use No     Social History     Substance and Sexual Activity   Drug Use No     Social History     Tobacco Use   Smoking Status Former    Current packs/day: 0.00    Average packs/day: 1.5 packs/day for 12.0 years (18.0 ttl pk-yrs)    Types: Cigarettes    Start date: 1960     "Quit date: 1972    Years since quittin.5   Smokeless Tobacco Never     Review of Systems   Constitutional:  Negative for chills and fever.   HENT:  Negative for ear pain and sore throat.    Eyes:  Negative for pain and visual disturbance.   Respiratory:  Negative for cough and shortness of breath.    Cardiovascular:  Negative for chest pain and palpitations.   Gastrointestinal:  Negative for abdominal pain and vomiting.   Genitourinary:  Negative for dysuria and hematuria.   Musculoskeletal:  Positive for arthralgias (right knee), gait problem (Antalgic) and joint swelling (Right knee). Negative for back pain.   Skin:  Negative for color change and rash.   Neurological:  Negative for seizures and syncope.   Psychiatric/Behavioral: Negative.     All other systems reviewed and are negative.        Objective:  BP Readings from Last 1 Encounters:   24 124/80      Wt Readings from Last 1 Encounters:   24 124 kg (273 lb)      BMI:   Estimated body mass index is 41.51 kg/m² as calculated from the following:    Height as of this encounter: 5' 8\" (1.727 m).    Weight as of this encounter: 124 kg (273 lb).  BSA:   Estimated body surface area is 2.33 meters squared as calculated from the following:    Height as of this encounter: 5' 8\" (1.727 m).    Weight as of this encounter: 124 kg (273 lb).   Physical Exam  Constitutional:       Appearance: He is well-developed.   Eyes:      Pupils: Pupils are equal, round, and reactive to light.   Pulmonary:      Effort: Pulmonary effort is normal.      Breath sounds: Normal breath sounds.   Musculoskeletal:         General: Tenderness (right knee arthralgia) present.      Right knee: Effusion (Grade 1) present.      Instability Tests: Medial Javed test negative and lateral Javed test negative.   Skin:     General: Skin is warm and dry.   Neurological:      Mental Status: He is alert and oriented to person, place, and time.      Deep Tendon Reflexes: Reflexes are " normal and symmetric.   Psychiatric:         Behavior: Behavior normal.         Thought Content: Thought content normal.         Judgment: Judgment normal.       Right Knee Exam     Tenderness   The patient is experiencing no tenderness.     Range of Motion   Extension:  0   Flexion:  90     Tests   Javed:  Medial - negative Lateral - negative  Varus: negative Valgus: negative  Patellar apprehension: negative    Other   Erythema: absent  Scars: absent  Sensation: normal  Pulse: present  Swelling: mild  Effusion: effusion (Grade 1) present    Comments:  Crepitation with motion of patella   Chronic lower extremity swelling            I have personally reviewed pertinent films in PACS and my interpretation is x-ray right knee mild osteoarthritis with marginal small osteophytes throughout.  Duplex scan was negative for acute DVT.    Large joint arthrocentesis: R knee  Universal Protocol:  Consent: Verbal consent obtained.  Risks and benefits: risks, benefits and alternatives were discussed  Consent given by: patient  Patient understanding: patient states understanding of the procedure being performed  Site marked: the operative site was marked  Patient identity confirmed: verbally with patient  Supporting Documentation  Indications: pain and joint swelling   Procedure Details  Location: knee - R knee  Preparation: Patient was prepped and draped in the usual sterile fashion  Needle size: 18 G  Ultrasound guidance: no  Approach: Superolateral.  Medications administered: 7 mL lidocaine (PF) 1 %; 6 mg betamethasone acetate-betamethasone sodium phosphate 6 (3-3) mg/mL    Aspirate amount: 10 mL  Aspirate: yellow  Patient tolerance: patient tolerated the procedure well with no immediate complications  Dressing:  Sterile dressing applied    5 ML 1% lidocaine as local anesthetic with 25 gauge needle          Scribe Attestation      I,:  Marcelo Woodard am acting as a scribe while in the presence of the attending physician.:        I,:  Hermelinda Avila MD personally performed the services described in this documentation    as scribed in my presence.:

## 2024-08-02 ENCOUNTER — TELEPHONE (OUTPATIENT)
Age: 84
End: 2024-08-02

## 2024-08-02 NOTE — TELEPHONE ENCOUNTER
Caller: Daughter-Loly    Doctor: Dr. Avila    Reason for call: Daughter calling stating that her father is in a lot of pain and is looking for advisement as to what he can do.  He can only take Tylenol.  Patient is scheduled for f/u on 8/7/24.     Call back#: [phone]

## 2024-08-02 NOTE — TELEPHONE ENCOUNTER
CLM on pt's daughter's ID VM w/D. Saul's msg in detail. CB w/questions/concerns for if msg needs to be repeated.

## 2024-08-02 NOTE — TELEPHONE ENCOUNTER
Caller: Daughter     Doctor: Margarita     Reason for call: Asked where to get the Voltaren gel, call was warm transferred to nurse

## 2024-08-02 NOTE — TELEPHONE ENCOUNTER
Received transfer call from pt's daughter Loly and she received DAPHNE Hughes's msg. She wanted to know where to get Voltaren gel.  Informed can get OTC at drug store [facility], [facility], [facility], [facility]. Can even order on [employer].  Another name is Diclofenac.  Pt is on Xeralto so cannot take NSAIDs so that is why this is recommended and it does help w/knee pain.  There are directions on box for amount to put on.  CB if needed.

## 2024-08-07 ENCOUNTER — OFFICE VISIT (OUTPATIENT)
Dept: OBGYN CLINIC | Facility: CLINIC | Age: 84
End: 2024-08-07
Payer: COMMERCIAL

## 2024-08-07 VITALS
DIASTOLIC BLOOD PRESSURE: 70 MMHG | BODY MASS INDEX: 41.68 KG/M2 | WEIGHT: 275 LBS | HEIGHT: 68 IN | SYSTOLIC BLOOD PRESSURE: 122 MMHG

## 2024-08-07 DIAGNOSIS — M17.11 PRIMARY OSTEOARTHRITIS OF RIGHT KNEE: ICD-10-CM

## 2024-08-07 DIAGNOSIS — M25.461 EFFUSION OF RIGHT KNEE: Primary | ICD-10-CM

## 2024-08-07 PROCEDURE — 99213 OFFICE O/P EST LOW 20 MIN: CPT | Performed by: ORTHOPAEDIC SURGERY

## 2024-08-07 NOTE — PROGRESS NOTES
Assessment:     1. Effusion of right knee    2. Primary osteoarthritis of right knee     Plan:    Problem List Items Addressed This Visit          Musculoskeletal and Integument    Primary osteoarthritis of right knee    Relevant Orders    MRI knee right  wo contrast    Effusion of right knee - Primary    Findings consistent with mild right knee osteoarthritis with effusion, concern for acute medial meniscus tear.  Discussed findings and treatment options with the patient.   We discussed he may have a torn meniscus upon exam.  I recommended MRI of his right knee he was advised that he may need surgery. I advsied the patient to avoid repetitive step, twisting of his knee and lunges.  We discussed use over-the-counter NSAID. He was advised to return after his MRI to discuss the results and treatment options.  All patient's questions were answered to her/his satisfaction. This note is created using dictation transcription. It may contain typographical errors, grammatical errors, improperly dictated words, background noise and other errors.    Subjective:     Patient ID: Ayad Brambila is a 84 y.o. male.  Chief Complaint:  84 yr old male in for follow up for his Right knee. Patient states the injection he received last visit on 6/26/2024 only gave him one day of relief. He states he was out with his dog about 1 week ago . The dog grabbed a squirrel and he pulled the leash to get the dog to release the animal. He states this action aggrivated his Right knee. He did have an episose of instability a few days ago but he feels his knee is improving since then. Knee overal is about where it was last visit.  Most of his pain is localized over the inner aspect of his knee and is sharp when it occurs.  He denies locking but there is a sensation of giving away when the pain occurred.    Allergy:  No Known Allergies  Medications:  all current active meds have been reviewed  Past Medical History:  Past Medical History:   Diagnosis  Date    Arthritis     Bladder neck contracture     Cancer (HCC)     ED (erectile dysfunction)     Frequency of urination     Hypercholesterolemia     Hypertension     17    Nocturia     Prostate cancer (HCC)     16    Stress incontinence      Past Surgical History:  Past Surgical History:   Procedure Laterality Date    BLADDER SURGERY      Bladder Neck Restriction, SP Catheter    CARPAL TUNNEL RELEASE      CHOLECYSTECTOMY      CYSTOSTOMY W/ BLADDER DILATION      PROSTATECTOMY      Radical    URETHRAL SLING       Family History:  Family History   Problem Relation Age of Onset    Coronary artery disease Father         involving native coronary artery of native heart, angina presence unspecified     Social History:  Social History     Substance and Sexual Activity   Alcohol Use No     Social History     Substance and Sexual Activity   Drug Use No     Social History     Tobacco Use   Smoking Status Former    Current packs/day: 0.00    Average packs/day: 1.5 packs/day for 12.0 years (18.0 ttl pk-yrs)    Types: Cigarettes    Start date:     Quit date:     Years since quittin.6   Smokeless Tobacco Never     Review of Systems   Constitutional:  Negative for chills and fever.   HENT:  Negative for ear pain and sore throat.    Eyes:  Negative for pain and visual disturbance.   Respiratory:  Negative for cough and shortness of breath.    Cardiovascular:  Negative for chest pain and palpitations.   Gastrointestinal:  Negative for abdominal pain and vomiting.   Genitourinary:  Negative for dysuria and hematuria.   Musculoskeletal:  Positive for arthralgias (right knee), gait problem (Antalgic) and joint swelling (Right knee). Negative for back pain.   Skin:  Negative for color change and rash.   Neurological:  Negative for seizures and syncope.   Psychiatric/Behavioral: Negative.     All other systems reviewed and are negative.        Objective:  BP Readings from Last 1 Encounters:   24 122/70     "  Wt Readings from Last 1 Encounters:   08/07/24 125 kg (275 lb)      BMI:   Estimated body mass index is 41.81 kg/m² as calculated from the following:    Height as of this encounter: 5' 8\" (1.727 m).    Weight as of this encounter: 125 kg (275 lb).  BSA:   Estimated body surface area is 2.34 meters squared as calculated from the following:    Height as of this encounter: 5' 8\" (1.727 m).    Weight as of this encounter: 125 kg (275 lb).   Physical Exam  Vitals and nursing note reviewed.   Constitutional:       Appearance: He is well-developed. He is obese.   HENT:      Head: Normocephalic and atraumatic.      Right Ear: External ear normal.      Left Ear: External ear normal.   Eyes:      Extraocular Movements: Extraocular movements intact.      Conjunctiva/sclera: Conjunctivae normal.   Pulmonary:      Effort: Pulmonary effort is normal.   Musculoskeletal:         General: Tenderness (right knee arthralgia) present.      Cervical back: Neck supple.      Right knee: Effusion (Grade 1) present.      Instability Tests: Medial Javed test positive. Lateral Javed test negative.   Skin:     General: Skin is warm and dry.   Neurological:      Mental Status: He is alert and oriented to person, place, and time.      Deep Tendon Reflexes: Reflexes are normal and symmetric.   Psychiatric:         Mood and Affect: Mood normal.         Behavior: Behavior normal.       Right Knee Exam     Tenderness   The patient is experiencing tenderness in the medial joint line.    Range of Motion   Extension:  0   Flexion:  110     Tests   Javed:  Medial - positive Lateral - negative  Varus: negative Valgus: negative  Patellar apprehension: negative    Other   Erythema: absent  Scars: absent  Sensation: normal  Pulse: present  Swelling: mild  Effusion: effusion (Grade 1) present            No new images obatined    Scribe Attestation      I,:  Uri Forrest am acting as a scribe while in the presence of the attending physician.:     "   I,:  Hermelinda Avila MD personally performed the services described in this documentation    as scribed in my presence.:

## 2024-08-19 DIAGNOSIS — I82.4Y1 ACUTE DEEP VEIN THROMBOSIS (DVT) OF PROXIMAL VEIN OF RIGHT LOWER EXTREMITY (HCC): ICD-10-CM

## 2024-08-19 RX ORDER — RIVAROXABAN 20 MG/1
TABLET, FILM COATED ORAL
Qty: 90 TABLET | Refills: 1 | Status: SHIPPED | OUTPATIENT
Start: 2024-08-19

## 2024-08-26 ENCOUNTER — HOSPITAL ENCOUNTER (OUTPATIENT)
Dept: MRI IMAGING | Facility: HOSPITAL | Age: 84
Discharge: HOME | End: 2024-08-26
Attending: ORTHOPAEDIC SURGERY
Payer: COMMERCIAL

## 2024-08-26 DIAGNOSIS — M17.11 PRIMARY OSTEOARTHRITIS OF RIGHT KNEE: ICD-10-CM

## 2024-08-26 PROCEDURE — 73721 MRI JNT OF LWR EXTRE W/O DYE: CPT

## 2024-08-28 ENCOUNTER — APPOINTMENT (OUTPATIENT)
Dept: LAB | Facility: HOSPITAL | Age: 84
End: 2024-08-28
Payer: COMMERCIAL

## 2024-08-28 ENCOUNTER — OFFICE VISIT (OUTPATIENT)
Dept: OBGYN CLINIC | Facility: CLINIC | Age: 84
End: 2024-08-28
Payer: COMMERCIAL

## 2024-08-28 VITALS
WEIGHT: 273 LBS | HEIGHT: 68 IN | DIASTOLIC BLOOD PRESSURE: 72 MMHG | SYSTOLIC BLOOD PRESSURE: 120 MMHG | BODY MASS INDEX: 41.37 KG/M2

## 2024-08-28 DIAGNOSIS — S83.241A ACUTE MEDIAL MENISCUS TEAR OF RIGHT KNEE, INITIAL ENCOUNTER: ICD-10-CM

## 2024-08-28 DIAGNOSIS — S83.281A ACUTE LATERAL MENISCUS TEAR OF RIGHT KNEE, INITIAL ENCOUNTER: ICD-10-CM

## 2024-08-28 DIAGNOSIS — S83.241A ACUTE MEDIAL MENISCUS TEAR OF RIGHT KNEE, INITIAL ENCOUNTER: Primary | ICD-10-CM

## 2024-08-28 LAB
ANION GAP SERPL CALCULATED.3IONS-SCNC: 5 MMOL/L (ref 4–13)
BUN SERPL-MCNC: 18 MG/DL (ref 5–25)
CALCIUM SERPL-MCNC: 8.9 MG/DL (ref 8.4–10.2)
CHLORIDE SERPL-SCNC: 105 MMOL/L (ref 96–108)
CO2 SERPL-SCNC: 26 MMOL/L (ref 21–32)
CREAT SERPL-MCNC: 0.98 MG/DL (ref 0.6–1.3)
ERYTHROCYTE [DISTWIDTH] IN BLOOD BY AUTOMATED COUNT: 12.6 % (ref 11.6–15.1)
GFR SERPL CREATININE-BSD FRML MDRD: 71 ML/MIN/1.73SQ M
GLUCOSE SERPL-MCNC: 97 MG/DL (ref 65–140)
HCT VFR BLD AUTO: 42.7 % (ref 36.5–49.3)
HGB BLD-MCNC: 14.4 G/DL (ref 12–17)
MCH RBC QN AUTO: 31.9 PG (ref 26.8–34.3)
MCHC RBC AUTO-ENTMCNC: 33.7 G/DL (ref 31.4–37.4)
MCV RBC AUTO: 95 FL (ref 82–98)
PLATELET # BLD AUTO: 171 THOUSANDS/UL (ref 149–390)
PMV BLD AUTO: 9.7 FL (ref 8.9–12.7)
POTASSIUM SERPL-SCNC: 4.6 MMOL/L (ref 3.5–5.3)
RBC # BLD AUTO: 4.52 MILLION/UL (ref 3.88–5.62)
SODIUM SERPL-SCNC: 136 MMOL/L (ref 135–147)
WBC # BLD AUTO: 4.69 THOUSAND/UL (ref 4.31–10.16)

## 2024-08-28 PROCEDURE — 80048 BASIC METABOLIC PNL TOTAL CA: CPT

## 2024-08-28 PROCEDURE — 36415 COLL VENOUS BLD VENIPUNCTURE: CPT

## 2024-08-28 PROCEDURE — 93005 ELECTROCARDIOGRAM TRACING: CPT

## 2024-08-28 PROCEDURE — 99214 OFFICE O/P EST MOD 30 MIN: CPT | Performed by: ORTHOPAEDIC SURGERY

## 2024-08-28 PROCEDURE — 85027 COMPLETE CBC AUTOMATED: CPT

## 2024-08-28 RX ORDER — CHLORHEXIDINE GLUCONATE 40 MG/ML
SOLUTION TOPICAL DAILY PRN
OUTPATIENT
Start: 2024-08-28

## 2024-08-28 RX ORDER — CHLORHEXIDINE GLUCONATE ORAL RINSE 1.2 MG/ML
15 SOLUTION DENTAL ONCE
OUTPATIENT
Start: 2024-08-28 | End: 2024-08-28

## 2024-08-28 NOTE — PROGRESS NOTES
Assessment:     1. Acute medial meniscus tear of right knee, initial encounter    2. Acute lateral meniscus tear of right knee, initial encounter       Plan:    Problem List Items Addressed This Visit          Musculoskeletal and Integument    Acute lateral meniscus tear of right knee, initial encounter    Relevant Orders    Case request operating room: ARTHROSCOPY KNEE WITH PARTIAL MEDIAL AND LATERAL MENISCECTOMIES (Completed)    Ambulatory referral to [facility]    Basic metabolic panel    CBC and Platelet    EKG 12 lead    Crutches    Acute medial meniscus tear of right knee, initial encounter - Primary    Relevant Orders    Case request operating room: ARTHROSCOPY KNEE WITH PARTIAL MEDIAL AND LATERAL MENISCECTOMIES (Completed)    Ambulatory referral to [facility]    Basic metabolic panel    CBC and Platelet    EKG 12 lead    Crutches    Findings consistent with mild right knee osteoarthritis with effusion and acute medial and lateral meniscus tear.  Discussed findings and treatment options with the patient.   I reviewed patient's right knee MRI and radiologist report with him.  I discussed prognosis of his knee condition.  Patient had tried conservative managements with the resting, low impact exercises, and cortisone injection but continued to have swelling and pain in his knee.  I recommended surgical invention with right knee arthroscopy partial medial and lateral meniscectomy, which patient would like to proceed with the surgery.  We will schedule patient's outpatient procedure.  All patient's questions were answered to her/his satisfaction. This note is created using dictation transcription. It may contain typographical errors, grammatical errors, improperly dictated words, background noise and other errors.    Discussed with patient surgical risks and complications including but not limited to infection, persistent pain, nerve and vessel injury, blood loss, complications associated with  anesthesia, DVT, exposure to COVID virus, etc.  Patient understands the risks and complication and consented to the surgery.    Subjective:     Patient ID: Ayad Brambila is a 84 y.o. male.  Chief Complaint:  84 yr old male in for follow up for his Right knee. Patient states the injection he received last visit on 6/26/2024 only gave him one day of relief. He states he was out with his dog a few weeks ago, the dog grabbed a squirrel and he pulled the leash to get the dog to release the animal. He states this action aggrivated his Right knee. He did have an episose of instability a few days ago but he feels his knee is improving since then. Knee overal is about where it was last visit.  Most of his pain is localized over the inner aspect of his knee and is sharp when it occurs.  He denies locking but there is a sensation of giving away when the pain occurred.  He is here to review his right knee MRI.    Allergy:  No Known Allergies  Medications:  all current active meds have been reviewed  Past Medical History:  Past Medical History:   Diagnosis Date    Arthritis     Bladder neck contracture     Cancer (HCC)     ED (erectile dysfunction)     Frequency of urination     Hypercholesterolemia     Hypertension     12/13/17    Nocturia     Prostate cancer (HCC)     12/8/16    Stress incontinence      Past Surgical History:  Past Surgical History:   Procedure Laterality Date    BLADDER SURGERY      Bladder Neck Restriction, SP Catheter    CARPAL TUNNEL RELEASE      CHOLECYSTECTOMY      CYSTOSTOMY W/ BLADDER DILATION      PROSTATECTOMY      Radical    URETHRAL SLING       Family History:  Family History   Problem Relation Age of Onset    Coronary artery disease Father         involving native coronary artery of native heart, angina presence unspecified     Social History:  Social History     Substance and Sexual Activity   Alcohol Use No     Social History     Substance and Sexual Activity   Drug Use No     Social History  "    Tobacco Use   Smoking Status Former    Current packs/day: 0.00    Average packs/day: 1.5 packs/day for 12.0 years (18.0 ttl pk-yrs)    Types: Cigarettes    Start date:     Quit date:     Years since quittin.6   Smokeless Tobacco Never     Review of Systems   Constitutional:  Negative for chills and fever.   HENT:  Negative for ear pain and sore throat.    Eyes:  Negative for pain and visual disturbance.   Respiratory:  Negative for cough and shortness of breath.    Cardiovascular:  Negative for chest pain and palpitations.   Gastrointestinal:  Negative for abdominal pain and vomiting.   Genitourinary:  Negative for dysuria and hematuria.   Musculoskeletal:  Positive for arthralgias (right knee), gait problem (Antalgic) and joint swelling (Right knee). Negative for back pain.   Skin:  Negative for color change and rash.   Neurological:  Negative for seizures and syncope.   Psychiatric/Behavioral: Negative.     All other systems reviewed and are negative.        Objective:  BP Readings from Last 1 Encounters:   24 120/72      Wt Readings from Last 1 Encounters:   24 124 kg (273 lb)      BMI:   Estimated body mass index is 41.51 kg/m² as calculated from the following:    Height as of this encounter: 5' 8\" (1.727 m).    Weight as of this encounter: 124 kg (273 lb).  BSA:   Estimated body surface area is 2.33 meters squared as calculated from the following:    Height as of this encounter: 5' 8\" (1.727 m).    Weight as of this encounter: 124 kg (273 lb).   Physical Exam  Vitals and nursing note reviewed.   Constitutional:       Appearance: Normal appearance. He is well-developed. He is obese.   HENT:      Head: Normocephalic and atraumatic.      Right Ear: External ear normal.      Left Ear: External ear normal.   Eyes:      Extraocular Movements: Extraocular movements intact.      Conjunctiva/sclera: Conjunctivae normal.      Pupils: Pupils are equal, round, and reactive to light. "   Cardiovascular:      Rate and Rhythm: Regular rhythm.      Heart sounds: Normal heart sounds. No murmur heard.     No gallop.   Pulmonary:      Effort: Pulmonary effort is normal.      Breath sounds: Normal breath sounds. No wheezing or rales.   Abdominal:      Palpations: Abdomen is soft.   Musculoskeletal:         General: Tenderness (right knee arthralgia) present.      Cervical back: Normal range of motion and neck supple.      Right knee: Effusion (Grade 1) present.      Instability Tests: Medial Javed test positive. Lateral Javed test negative.   Skin:     General: Skin is warm and dry.   Neurological:      Mental Status: He is alert and oriented to person, place, and time.      Deep Tendon Reflexes: Reflexes are normal and symmetric.   Psychiatric:         Mood and Affect: Mood normal.         Behavior: Behavior normal.       Right Knee Exam     Tenderness   The patient is experiencing tenderness in the medial joint line.    Range of Motion   Extension:  0   Flexion:  110     Tests   Javde:  Medial - positive Lateral - negative  Varus: negative Valgus: negative  Patellar apprehension: negative    Other   Erythema: absent  Scars: absent  Sensation: normal  Pulse: present  Swelling: mild  Effusion: effusion (Grade 1) present            Oblique tear of the body of the medial meniscus with small flipped fragment in the inferior recess.  Complex predominantly horizontal tear of the body of the lateral meniscus.  Pulm edema over medial aspect of medial tibial plateau.

## 2024-08-28 NOTE — H&P (VIEW-ONLY)
Assessment:     1. Acute medial meniscus tear of right knee, initial encounter    2. Acute lateral meniscus tear of right knee, initial encounter       Plan:    Problem List Items Addressed This Visit          Musculoskeletal and Integument    Acute lateral meniscus tear of right knee, initial encounter    Relevant Orders    Case request operating room: ARTHROSCOPY KNEE WITH PARTIAL MEDIAL AND LATERAL MENISCECTOMIES (Completed)    Ambulatory referral to [facility]    Basic metabolic panel    CBC and Platelet    EKG 12 lead    Crutches    Acute medial meniscus tear of right knee, initial encounter - Primary    Relevant Orders    Case request operating room: ARTHROSCOPY KNEE WITH PARTIAL MEDIAL AND LATERAL MENISCECTOMIES (Completed)    Ambulatory referral to [facility]    Basic metabolic panel    CBC and Platelet    EKG 12 lead    Crutches    Findings consistent with mild right knee osteoarthritis with effusion and acute medial and lateral meniscus tear.  Discussed findings and treatment options with the patient.   I reviewed patient's right knee MRI and radiologist report with him.  I discussed prognosis of his knee condition.  Patient had tried conservative managements with the resting, low impact exercises, and cortisone injection but continued to have swelling and pain in his knee.  I recommended surgical invention with right knee arthroscopy partial medial and lateral meniscectomy, which patient would like to proceed with the surgery.  We will schedule patient's outpatient procedure.  All patient's questions were answered to her/his satisfaction. This note is created using dictation transcription. It may contain typographical errors, grammatical errors, improperly dictated words, background noise and other errors.    Discussed with patient surgical risks and complications including but not limited to infection, persistent pain, nerve and vessel injury, blood loss, complications associated with  anesthesia, DVT, exposure to COVID virus, etc.  Patient understands the risks and complication and consented to the surgery.    Subjective:     Patient ID: Ayad Brambila is a 84 y.o. male.  Chief Complaint:  84 yr old male in for follow up for his Right knee. Patient states the injection he received last visit on 6/26/2024 only gave him one day of relief. He states he was out with his dog a few weeks ago, the dog grabbed a squirrel and he pulled the leash to get the dog to release the animal. He states this action aggrivated his Right knee. He did have an episose of instability a few days ago but he feels his knee is improving since then. Knee overal is about where it was last visit.  Most of his pain is localized over the inner aspect of his knee and is sharp when it occurs.  He denies locking but there is a sensation of giving away when the pain occurred.  He is here to review his right knee MRI.    Allergy:  No Known Allergies  Medications:  all current active meds have been reviewed  Past Medical History:  Past Medical History:   Diagnosis Date    Arthritis     Bladder neck contracture     Cancer (HCC)     ED (erectile dysfunction)     Frequency of urination     Hypercholesterolemia     Hypertension     12/13/17    Nocturia     Prostate cancer (HCC)     12/8/16    Stress incontinence      Past Surgical History:  Past Surgical History:   Procedure Laterality Date    BLADDER SURGERY      Bladder Neck Restriction, SP Catheter    CARPAL TUNNEL RELEASE      CHOLECYSTECTOMY      CYSTOSTOMY W/ BLADDER DILATION      PROSTATECTOMY      Radical    URETHRAL SLING       Family History:  Family History   Problem Relation Age of Onset    Coronary artery disease Father         involving native coronary artery of native heart, angina presence unspecified     Social History:  Social History     Substance and Sexual Activity   Alcohol Use No     Social History     Substance and Sexual Activity   Drug Use No     Social History  "    Tobacco Use   Smoking Status Former    Current packs/day: 0.00    Average packs/day: 1.5 packs/day for 12.0 years (18.0 ttl pk-yrs)    Types: Cigarettes    Start date:     Quit date:     Years since quittin.6   Smokeless Tobacco Never     Review of Systems   Constitutional:  Negative for chills and fever.   HENT:  Negative for ear pain and sore throat.    Eyes:  Negative for pain and visual disturbance.   Respiratory:  Negative for cough and shortness of breath.    Cardiovascular:  Negative for chest pain and palpitations.   Gastrointestinal:  Negative for abdominal pain and vomiting.   Genitourinary:  Negative for dysuria and hematuria.   Musculoskeletal:  Positive for arthralgias (right knee), gait problem (Antalgic) and joint swelling (Right knee). Negative for back pain.   Skin:  Negative for color change and rash.   Neurological:  Negative for seizures and syncope.   Psychiatric/Behavioral: Negative.     All other systems reviewed and are negative.        Objective:  BP Readings from Last 1 Encounters:   24 120/72      Wt Readings from Last 1 Encounters:   24 124 kg (273 lb)      BMI:   Estimated body mass index is 41.51 kg/m² as calculated from the following:    Height as of this encounter: 5' 8\" (1.727 m).    Weight as of this encounter: 124 kg (273 lb).  BSA:   Estimated body surface area is 2.33 meters squared as calculated from the following:    Height as of this encounter: 5' 8\" (1.727 m).    Weight as of this encounter: 124 kg (273 lb).   Physical Exam  Vitals and nursing note reviewed.   Constitutional:       Appearance: Normal appearance. He is well-developed. He is obese.   HENT:      Head: Normocephalic and atraumatic.      Right Ear: External ear normal.      Left Ear: External ear normal.   Eyes:      Extraocular Movements: Extraocular movements intact.      Conjunctiva/sclera: Conjunctivae normal.      Pupils: Pupils are equal, round, and reactive to light. "   Cardiovascular:      Rate and Rhythm: Regular rhythm.      Heart sounds: Normal heart sounds. No murmur heard.     No gallop.   Pulmonary:      Effort: Pulmonary effort is normal.      Breath sounds: Normal breath sounds. No wheezing or rales.   Abdominal:      Palpations: Abdomen is soft.   Musculoskeletal:         General: Tenderness (right knee arthralgia) present.      Cervical back: Normal range of motion and neck supple.      Right knee: Effusion (Grade 1) present.      Instability Tests: Medial Javed test positive. Lateral Javed test negative.   Skin:     General: Skin is warm and dry.   Neurological:      Mental Status: He is alert and oriented to person, place, and time.      Deep Tendon Reflexes: Reflexes are normal and symmetric.   Psychiatric:         Mood and Affect: Mood normal.         Behavior: Behavior normal.       Right Knee Exam     Tenderness   The patient is experiencing tenderness in the medial joint line.    Range of Motion   Extension:  0   Flexion:  110     Tests   Javed:  Medial - positive Lateral - negative  Varus: negative Valgus: negative  Patellar apprehension: negative    Other   Erythema: absent  Scars: absent  Sensation: normal  Pulse: present  Swelling: mild  Effusion: effusion (Grade 1) present            Oblique tear of the body of the medial meniscus with small flipped fragment in the inferior recess.  Complex predominantly horizontal tear of the body of the lateral meniscus.  Pulm edema over medial aspect of medial tibial plateau.

## 2024-08-30 ENCOUNTER — TELEPHONE (OUTPATIENT)
Age: 84
End: 2024-08-30

## 2024-08-30 LAB
ATRIAL RATE: 52 BPM
P AXIS: 19 DEGREES
PR INTERVAL: 172 MS
QRS AXIS: -62 DEGREES
QRSD INTERVAL: 102 MS
QT INTERVAL: 440 MS
QTC INTERVAL: 409 MS
T WAVE AXIS: -10 DEGREES
VENTRICULAR RATE: 52 BPM

## 2024-08-30 PROCEDURE — 93010 ELECTROCARDIOGRAM REPORT: CPT | Performed by: INTERNAL MEDICINE

## 2024-08-30 NOTE — PRE-PROCEDURE INSTRUCTIONS
Pre-Surgery Instructions:   Medication Instructions    lisinopril (ZESTRIL) 5 mg tablet Hold day of surgery.    NON FORMULARY  Stop taking 7 days prior to surgery.    Xarelto 20 MG tablet Pt  states he will call Dr Zavala today to get hold instructions for Xaretto.Previously told to hold 5 days. prior   Medication instructions for day surgery reviewed. Please use only a sip of water to take your instructed medications. Avoid all over the counter vitamins, supplements and NSAIDS for one week prior to surgery per anesthesia guidelines. Tylenol is ok to take as needed.     You will receive a call one business day prior to surgery with an arrival time and hospital directions. If your surgery is scheduled on a Monday, the hospital will be calling you on the Friday prior to your surgery. If you have not heard from anyone by 8pm, please call the hospital supervisor through the hospital  at [phone]. ([place] [phone] or [place] [phone]).    Do not eat or drink anything after midnight the night before your surgery, including candy, mints, lifesavers, or chewing gum. Do not drink alcohol 24hrs before your surgery. Try not to smoke at least 24hrs before your surgery.       Follow the pre surgery showering instructions as listed in the “My Surgical Experience Booklet” or otherwise provided by your surgeon's office. Do not use a blade to shave the surgical area 1 week before surgery. It is okay to use a clean electric clippers up to 24 hours before surgery. Do not apply any lotions, creams, including makeup, cologne, deodorant, or perfumes after showering on the day of your surgery. Do not use dry shampoo, hair spray, hair gel, or any type of hair products.     No contact lenses, eye make-up, or artificial eyelashes. Remove nail polish, including gel polish, and any artificial, gel, or acrylic nails if possible. Remove all jewelry including rings and body piercing jewelry.     Wear causal clothing that is  easy to take on and off. Consider your type of surgery.    Keep any valuables, jewelry, piercings at home. Please bring any specially ordered equipment (sling, braces) if indicated.    Arrange for a responsible person to drive you to and from the hospital on the day of your surgery. Please confirm the visitor policy for the day of your procedure when you receive your phone call with an arrival time.     Call the surgeon's office with any new illnesses, exposures, or additional questions prior to surgery.    Please reference your “My Surgical Experience Booklet” for additional information to prepare for your upcoming surgery.

## 2024-08-30 NOTE — TELEPHONE ENCOUNTER
Joyce from [facility] pre admission testing called in regards to pt holding off the Xarelto before his surgery scheduled on 09/06/24. Will be faxing a form over for his current meds... provided fax number to PCP. Please review forms  and fax back

## 2024-08-30 NOTE — TELEPHONE ENCOUNTER
Pt called and was advised to ask Sharla Zavala when he should stop taking his Xarelto medication before his knee surgery on 9/6. He mentioned last surgery that they advised him stopping a week before but he needs a definite answer to let the [facility] Pre-op office aware. Please advise pt once confirmed.

## 2024-09-03 ENCOUNTER — CONSULT (OUTPATIENT)
Dept: FAMILY MEDICINE CLINIC | Facility: HOSPITAL | Age: 84
End: 2024-09-03
Payer: COMMERCIAL

## 2024-09-03 VITALS
DIASTOLIC BLOOD PRESSURE: 70 MMHG | WEIGHT: 271.2 LBS | HEART RATE: 58 BPM | HEIGHT: 68 IN | OXYGEN SATURATION: 96 % | SYSTOLIC BLOOD PRESSURE: 116 MMHG | BODY MASS INDEX: 41.1 KG/M2

## 2024-09-03 DIAGNOSIS — S83.241A ACUTE MEDIAL MENISCUS TEAR OF RIGHT KNEE, INITIAL ENCOUNTER: ICD-10-CM

## 2024-09-03 DIAGNOSIS — Z79.01 CHRONIC ANTICOAGULATION: ICD-10-CM

## 2024-09-03 DIAGNOSIS — I82.4Y1 DEEP VEIN THROMBOSIS (DVT) OF PROXIMAL VEIN OF RIGHT LOWER EXTREMITY, UNSPECIFIED CHRONICITY (HCC): ICD-10-CM

## 2024-09-03 DIAGNOSIS — Z01.818 PRE-OP EXAMINATION: Primary | ICD-10-CM

## 2024-09-03 DIAGNOSIS — S83.281A ACUTE LATERAL MENISCUS TEAR OF RIGHT KNEE, INITIAL ENCOUNTER: ICD-10-CM

## 2024-09-03 DIAGNOSIS — I10 ESSENTIAL HYPERTENSION: ICD-10-CM

## 2024-09-03 PROCEDURE — G2211 COMPLEX E/M VISIT ADD ON: HCPCS | Performed by: NURSE PRACTITIONER

## 2024-09-03 PROCEDURE — 1159F MED LIST DOCD IN RCRD: CPT | Performed by: NURSE PRACTITIONER

## 2024-09-03 PROCEDURE — 3078F DIAST BP <80 MM HG: CPT | Performed by: NURSE PRACTITIONER

## 2024-09-03 PROCEDURE — 99214 OFFICE O/P EST MOD 30 MIN: CPT | Performed by: NURSE PRACTITIONER

## 2024-09-03 PROCEDURE — 1101F PT FALLS ASSESS-DOCD LE1/YR: CPT | Performed by: NURSE PRACTITIONER

## 2024-09-03 PROCEDURE — 3074F SYST BP LT 130 MM HG: CPT | Performed by: NURSE PRACTITIONER

## 2024-09-03 PROCEDURE — 3288F FALL RISK ASSESSMENT DOCD: CPT | Performed by: NURSE PRACTITIONER

## 2024-09-03 PROCEDURE — 1160F RVW MEDS BY RX/DR IN RCRD: CPT | Performed by: NURSE PRACTITIONER

## 2024-09-03 NOTE — PROGRESS NOTES
[facility] PRE-OPERATIVE EVALUATION  [facility] PHYSICIAN [facility] PRIMARY CARE [address]    NAME: Ayad Brambila  AGE: 84 y.o. SEX: male  : 1940   DATE: 9/3/2024    History of Present Illness:     Ayad Brambila is a 84 y.o. male who presents to the office today for a preoperative consultation at the request of surgeon, Dr. Hermelinda Avila, who plans on performing arthroscopy right knee with partial medial and lateral meniscectomies on 24. Planned anesthesia is general. Patient has a bleeding risk of: no recent abnormal bleeding, no remote history of abnormal bleeding, and no use of Ca-channel blockers. Patient does not have objections to receiving blood products if needed. Current anti-platelet/anti-coagulation medications that the patient is prescribed includes: Rivaroxaban (Xarelto).         Assessment of Cardiac Risk:  Denies unstable or severe angina or MI in the last 6 weeks or history of stent placement in the last year   Denies decompensated heart failure (e.g. New onset heart failure, NYHA functional class IV heart failure, or worsening existing heart failure)  Denies significant arrhythmias such as high grade AV block, symptomatic ventricular arrhythmia, newly recognized ventricular tachycardia, supraventricular tachycardia with resting heart rate >100, or symptomatic bradycardia  Denies severe heart valve disease including aortic stenosis or symptomatic mitral stenosis     Exercise Capacity:  Able to walk 4 blocks without symptoms?: Yes  Able to walk 2 flights without symptoms?: Yes    Prior Anesthesia Reactions: Yes, post op N/V  Personal history of venous thromboembolic disease? Yes  History of steroid use for >2 weeks within last year? No          Review of Systems:     Review of Systems   Constitutional: Negative.  Negative for activity change, appetite change, chills, fatigue and fever.   HENT: Negative.  Negative for congestion, ear pain, postnasal drip and sinus  pain.    Eyes: Negative.    Respiratory: Negative.  Negative for cough, chest tightness and shortness of breath.    Cardiovascular:  Positive for leg swelling. Negative for chest pain and palpitations.   Gastrointestinal: Negative.  Negative for blood in stool, constipation, diarrhea, nausea and vomiting.   Endocrine: Negative.    Genitourinary: Negative.  Negative for difficulty urinating, dysuria and hematuria.   Musculoskeletal:  Positive for arthralgias and gait problem.   Skin: Negative.    Allergic/Immunologic: Negative.  Negative for immunocompromised state.   Neurological:  Negative for dizziness, light-headedness and numbness.   Hematological:  Bruises/bleeds easily.   Psychiatric/Behavioral: Negative.  Negative for sleep disturbance.        Current Problem List:     Patient Active Problem List   Diagnosis    Essential hypertension    Personal history of prostate cancer    AKIRA (stress urinary incontinence), male    Bladder neck contracture    Bilateral carpal tunnel syndrome    Obesity, morbid (HCC)    Protrusion of lumbar intervertebral disc    Lumbar radiculopathy    Deep vein thrombosis (DVT) of proximal lower extremity (HCC)    Chronic pain syndrome    Lumbar spondylosis    Prostate cancer (HCC)    Chronic anticoagulation    Meralgia paraesthetica, left    Primary osteoarthritis of left hip    Primary osteoarthritis of right knee    Effusion of right knee    Acute medial meniscus tear of right knee    Acute lateral meniscus tear of right knee    Acute lateral meniscus tear of right knee, initial encounter    Acute medial meniscus tear of right knee, initial encounter       Allergies:     No Known Allergies    Current Medications:       Current Outpatient Medications:     lisinopril (ZESTRIL) 5 mg tablet, TAKE ONE BY MOUTH DAILY, Disp: 90 tablet, Rfl: 3    NON FORMULARY, Take 750 mg by mouth daily as needed [place] freeze dried green lip muscle., Disp: , Rfl:     Xarelto 20 MG tablet, TAKE ONE TABLET  (20 MG TOTAL) BY MOUTH DAILY WITH BREAKFAST, Disp: 90 tablet, Rfl: 1    fluticasone (FLONASE) 50 mcg/act nasal spray, 2 sprays into each nostril daily (Patient not taking: Reported on 9/3/2024), Disp: 9.9 mL, Rfl: 0    Past Medical History:       Past Medical History:   Diagnosis Date    Arthritis     Bladder neck contracture     Cancer (HCC)     ED (erectile dysfunction)     Frequency of urination     Hypercholesterolemia     Hypertension     19    Nocturia     PONV (postoperative nausea and vomiting)     Prostate cancer (HCC)     18    Stress incontinence         Past Surgical History:   Procedure Laterality Date    BLADDER SURGERY      Bladder Neck Restriction, SP Catheter    CARPAL TUNNEL RELEASE      CHOLECYSTECTOMY      CYSTOSTOMY W/ BLADDER DILATION      PROSTATECTOMY      Radical    URETHRAL SLING          Family History   Problem Relation Age of Onset    Coronary artery disease Father         involving native coronary artery of native heart, angina presence unspecified        Social History     Socioeconomic History    Marital status: /Civil Union     Spouse name: Not on file    Number of children: Not on file    Years of education: Not on file    Highest education level: Not on file   Occupational History    Not on file   Tobacco Use    Smoking status: Former     Current packs/day: 0.00     Average packs/day: 1.5 packs/day for 12.0 years (18.0 ttl pk-yrs)     Types: Cigarettes     Start date:      Quit date:      Years since quittin.7    Smokeless tobacco: Never   Vaping Use    Vaping status: Never Used   Substance and Sexual Activity    Alcohol use: No    Drug use: No    Sexual activity: Not on file   Other Topics Concern    Not on file   Social History Narrative    Not on file     Social Determinants of Health     Financial Resource Strain: Low Risk  (9/15/2023)    Overall Financial Resource Strain (CARDIA)     Difficulty of Paying Living Expenses: Not hard at all   Food  "Insecurity: Not on file   Transportation Needs: No Transportation Needs (9/15/2023)    PRAPARE - Transportation     Lack of Transportation (Medical): No     Lack of Transportation (Non-Medical): No   Physical Activity: Not on file   Stress: Not on file   Social Connections: Not on file   Intimate Partner Violence: Not on file   Housing Stability: Not on file        Physical Exam:     /70   Pulse 58   Ht 5' 8\" (1.727 m)   Wt 123 kg (271 lb 3.2 oz)   SpO2 96%   BMI 41.24 kg/m²     Physical Exam  Vitals and nursing note reviewed.   Constitutional:       General: He is awake.      Appearance: Normal appearance. He is obese.   HENT:      Head: Normocephalic and atraumatic.      Right Ear: Tympanic membrane, ear canal and external ear normal.      Left Ear: Tympanic membrane, ear canal and external ear normal.      Nose: Nose normal.      Mouth/Throat:      Mouth: Mucous membranes are moist.      Pharynx: Oropharynx is clear.   Eyes:      Extraocular Movements: Extraocular movements intact.      Conjunctiva/sclera: Conjunctivae normal.      Pupils: Pupils are equal, round, and reactive to light.   Cardiovascular:      Rate and Rhythm: Regular rhythm. Bradycardia present.      Pulses: Normal pulses.      Heart sounds: Normal heart sounds.      Comments: Apical rate 56.   Pulmonary:      Effort: Pulmonary effort is normal.      Breath sounds: Normal breath sounds.   Abdominal:      General: Bowel sounds are normal.      Palpations: Abdomen is soft.   Musculoskeletal:      Cervical back: Normal range of motion and neck supple.      Right knee: Decreased range of motion. Abnormal meniscus.      Right lower leg: Edema present.      Left lower leg: Edema present.      Comments: +1-2 pitting BLE edema R>L.  Negative homans.    Skin:     General: Skin is warm and dry.      Findings: Bruising present.      Comments: PVD discoloration  Ecchymosis left AC from recent venipuncture     Neurological:      General: No focal " deficit present.      Mental Status: He is alert and oriented to person, place, and time.      Gait: Gait abnormal.      Comments: Antalgic gait   Psychiatric:         Mood and Affect: Mood normal.         Behavior: Behavior normal. Behavior is cooperative.         Thought Content: Thought content normal.         Judgment: Judgment normal.          Data:     Pre-operative work-up    Laboratory Results:    Latest Reference Range & Units 08/28/24 12:32   Sodium 135 - 147 mmol/L 136   Potassium 3.5 - 5.3 mmol/L 4.6   Chloride 96 - 108 mmol/L 105   Carbon Dioxide 21 - 32 mmol/L 26   ANION GAP 4 - 13 mmol/L 5   BUN 5 - 25 mg/dL 18   Creatinine 0.60 - 1.30 mg/dL 0.98   GLUCOSE 65 - 140 mg/dL 97   Calcium 8.4 - 10.2 mg/dL 8.9   GFR, Calculated ml/min/1.73sq m 71   WBC 4.31 - 10.16 Thousand/uL 4.69   RBC 3.88 - 5.62 Million/uL 4.52   Hemoglobin 12.0 - 17.0 g/dL 14.4   Hematocrit 36.5 - 49.3 % 42.7   MCV 82 - 98 fL 95   MCH 26.8 - 34.3 pg 31.9   MCHC 31.4 - 37.4 g/dL 33.7   RDW 11.6 - 15.1 % 12.6   Platelet Count 149 - 390 Thousands/uL 171   MPV 8.9 - 12.7 fL 9.7        EKG: Sinus bradycardia with left anterior fascicular block.  Cannot rule out Anterior infarct , age undetermined       Chest x-ray:  N/A    Previous cardiopulmonary studies within the past year:  Echocardiogram: N/A  Cardiac Catheterization: N/A  Stress Test: N/A  Pulmonary Function Testing: N/A      Assessment & Recommendations:     1. Pre-op examination  2. Acute medial meniscus tear of right knee, initial encounter  3. Acute lateral meniscus tear of right knee, initial encounter  4. Deep vein thrombosis (DVT) of proximal vein of right lower extremity, unspecified chronicity (HCC)  Assessment & Plan:  Hx DVT RLE in 2021; has been on Xarelto LT, LD 9/2/24.  Will hold xarelto for upcoming knee surgery on 9/6/24.  OK to resume Xarelto post surgery per Dr. Avila's orders.  BLE swelling +1-2 pitting with negative homans on exam.  Mr. Brambila reports swelling at  baseline; does not wear compression stockings.       6/21/24 bilateral doppler  CONCLUSION:     Impression:  RIGHT LOWER LIMB  No evidence of acute or chronic deep vein thrombosis .  No evidence of superficial thrombophlebitis noted.  Doppler evaluation shows a normal response to augmentation maneuvers.  Popliteal, posterior tibial and anterior tibial arterial Doppler waveform's are  triphasic.     LEFT LOWER LIMB LIMITED  Evaluation shows no evidence of thrombus in the common femoral vein.  Doppler evaluation shows a normal response to augmentation maneuvers.  5. Essential hypertension  Assessment & Plan:  Well-controlled on lisinopril 5 mg daily.  6. Chronic anticoagulation  Assessment & Plan:  On Xarelto LT.  See hx DVT a/p      Pre-Op Evaluation Assessment  84 y.o. male with planned surgery: right knee arthroscopy with partial medial and lateral meniscectomies.    Known risk factors for perioperative complications: None.    Current medications which may produce withdrawal symptoms if withheld perioperatively: N/A.    Pre-Op Evaluation Plan  1. Further preoperative workup as follows:   - None; no further preoperative work-up is required    2. Medication Management/Recommendations:   - Patient has been instructed to avoid herbs or non-directed vitamins the week prior to surgery to ensure no drug interactions with perioperative surgical and anesthetic medications.  - Patient should continue antihypertensive medications up through and including the day of surgery.   -holding Xarelto as of today.  Will resume post surgery per Dr. Avila orders.     3. Prophylaxis for cardiac events with perioperative beta-blockers: not indicated.    4. Patient requires further consultation with: None    Clearance  Patient is CLEARED for surgery without any additional cardiac testing.     DAREN Page  [facility] PRIMARY CARE [address] [address]  Phone#  [phone]  Fax#   [phone]

## 2024-09-03 NOTE — ASSESSMENT & PLAN NOTE
Hx DVT RLE in 2021; has been on Xarelto LT, LD 9/2/24.  Will hold xarelto for upcoming knee surgery on 9/6/24.  OK to resume Xarelto post surgery per Dr. Avila's orders.  BLE swelling +1-2 pitting with negative homans on exam.  Mr. Brambila reports swelling at baseline; does not wear compression stockings.       6/21/24 bilateral doppler  CONCLUSION:     Impression:  RIGHT LOWER LIMB  No evidence of acute or chronic deep vein thrombosis .  No evidence of superficial thrombophlebitis noted.  Doppler evaluation shows a normal response to augmentation maneuvers.  Popliteal, posterior tibial and anterior tibial arterial Doppler waveform's are  triphasic.     LEFT LOWER LIMB LIMITED  Evaluation shows no evidence of thrombus in the common femoral vein.  Doppler evaluation shows a normal response to augmentation maneuvers.  
On Xarelto LT.  See hx DVT a/p  
Well-controlled on lisinopril 5 mg daily.  
Statement Selected

## 2024-09-06 ENCOUNTER — ANESTHESIA (OUTPATIENT)
Dept: PERIOP | Facility: HOSPITAL | Age: 84
End: 2024-09-06
Payer: COMMERCIAL

## 2024-09-06 ENCOUNTER — HOSPITAL ENCOUNTER (OUTPATIENT)
Facility: HOSPITAL | Age: 84
Setting detail: OUTPATIENT SURGERY
Discharge: HOME | End: 2024-09-06
Attending: ORTHOPAEDIC SURGERY | Admitting: ORTHOPAEDIC SURGERY
Payer: COMMERCIAL

## 2024-09-06 ENCOUNTER — ANESTHESIA EVENT (OUTPATIENT)
Dept: PERIOP | Facility: HOSPITAL | Age: 84
End: 2024-09-06
Payer: COMMERCIAL

## 2024-09-06 VITALS
DIASTOLIC BLOOD PRESSURE: 63 MMHG | HEART RATE: 52 BPM | OXYGEN SATURATION: 92 % | RESPIRATION RATE: 16 BRPM | SYSTOLIC BLOOD PRESSURE: 137 MMHG | WEIGHT: 268.96 LBS | HEIGHT: 68 IN | BODY MASS INDEX: 40.76 KG/M2 | TEMPERATURE: 97.4 F

## 2024-09-06 DIAGNOSIS — S83.281A ACUTE LATERAL MENISCUS TEAR OF RIGHT KNEE, INITIAL ENCOUNTER: ICD-10-CM

## 2024-09-06 DIAGNOSIS — S83.241A ACUTE MEDIAL MENISCUS TEAR OF RIGHT KNEE, INITIAL ENCOUNTER: Primary | ICD-10-CM

## 2024-09-06 PROCEDURE — 29880 ARTHRS KNE SRG MNISECTMY M&L: CPT | Performed by: PHYSICIAN ASSISTANT

## 2024-09-06 PROCEDURE — 29880 ARTHRS KNE SRG MNISECTMY M&L: CPT | Performed by: ORTHOPAEDIC SURGERY

## 2024-09-06 RX ORDER — BUPIVACAINE HYDROCHLORIDE AND EPINEPHRINE 5; 5 MG/ML; UG/ML
INJECTION, SOLUTION EPIDURAL; INTRACAUDAL; PERINEURAL AS NEEDED
Status: DISCONTINUED | OUTPATIENT
Start: 2024-09-06 | End: 2024-09-06 | Stop reason: HOSPADM

## 2024-09-06 RX ORDER — FENTANYL CITRATE/PF 50 MCG/ML
25 SYRINGE (ML) INJECTION
Status: DISCONTINUED | OUTPATIENT
Start: 2024-09-06 | End: 2024-09-06 | Stop reason: HOSPADM

## 2024-09-06 RX ORDER — FENTANYL CITRATE 50 UG/ML
INJECTION, SOLUTION INTRAMUSCULAR; INTRAVENOUS AS NEEDED
Status: DISCONTINUED | OUTPATIENT
Start: 2024-09-06 | End: 2024-09-06

## 2024-09-06 RX ORDER — DEXAMETHASONE SODIUM PHOSPHATE 10 MG/ML
INJECTION, SOLUTION INTRAMUSCULAR; INTRAVENOUS AS NEEDED
Status: DISCONTINUED | OUTPATIENT
Start: 2024-09-06 | End: 2024-09-06

## 2024-09-06 RX ORDER — FENTANYL CITRATE/PF 50 MCG/ML
50 SYRINGE (ML) INJECTION
Status: DISCONTINUED | OUTPATIENT
Start: 2024-09-06 | End: 2024-09-06 | Stop reason: HOSPADM

## 2024-09-06 RX ORDER — CEFAZOLIN SODIUM 1 G/50ML
1000 SOLUTION INTRAVENOUS ONCE
Status: COMPLETED | OUTPATIENT
Start: 2024-09-06 | End: 2024-09-06

## 2024-09-06 RX ORDER — CEFAZOLIN SODIUM 2 G/50ML
2000 SOLUTION INTRAVENOUS ONCE
Status: COMPLETED | OUTPATIENT
Start: 2024-09-06 | End: 2024-09-06

## 2024-09-06 RX ORDER — ONDANSETRON 2 MG/ML
4 INJECTION INTRAMUSCULAR; INTRAVENOUS ONCE AS NEEDED
Status: COMPLETED | OUTPATIENT
Start: 2024-09-06 | End: 2024-09-06

## 2024-09-06 RX ORDER — PROMETHAZINE HYDROCHLORIDE 25 MG/ML
12.5 INJECTION, SOLUTION INTRAMUSCULAR; INTRAVENOUS ONCE AS NEEDED
Status: DISCONTINUED | OUTPATIENT
Start: 2024-09-06 | End: 2024-09-06 | Stop reason: HOSPADM

## 2024-09-06 RX ORDER — ALBUTEROL SULFATE 0.83 MG/ML
2.5 SOLUTION RESPIRATORY (INHALATION) ONCE AS NEEDED
Status: DISCONTINUED | OUTPATIENT
Start: 2024-09-06 | End: 2024-09-06 | Stop reason: HOSPADM

## 2024-09-06 RX ORDER — PROPOFOL 10 MG/ML
INJECTION, EMULSION INTRAVENOUS AS NEEDED
Status: DISCONTINUED | OUTPATIENT
Start: 2024-09-06 | End: 2024-09-06

## 2024-09-06 RX ORDER — ACETAMINOPHEN 325 MG/1
650 TABLET ORAL EVERY 6 HOURS PRN
Status: DISCONTINUED | OUTPATIENT
Start: 2024-09-06 | End: 2024-09-06 | Stop reason: HOSPADM

## 2024-09-06 RX ORDER — LIDOCAINE HYDROCHLORIDE 10 MG/ML
INJECTION, SOLUTION EPIDURAL; INFILTRATION; INTRACAUDAL; PERINEURAL AS NEEDED
Status: DISCONTINUED | OUTPATIENT
Start: 2024-09-06 | End: 2024-09-06

## 2024-09-06 RX ORDER — HYDROMORPHONE HCL/PF 1 MG/ML
0.5 SYRINGE (ML) INJECTION
Status: DISCONTINUED | OUTPATIENT
Start: 2024-09-06 | End: 2024-09-06 | Stop reason: HOSPADM

## 2024-09-06 RX ORDER — OXYCODONE AND ACETAMINOPHEN 5; 325 MG/1; MG/1
1 TABLET ORAL EVERY 4 HOURS PRN
Qty: 15 TABLET | Refills: 0 | Status: SHIPPED | OUTPATIENT
Start: 2024-09-06 | End: 2024-09-16

## 2024-09-06 RX ORDER — OXYCODONE AND ACETAMINOPHEN 5; 325 MG/1; MG/1
1 TABLET ORAL EVERY 4 HOURS PRN
Status: DISCONTINUED | OUTPATIENT
Start: 2024-09-06 | End: 2024-09-06 | Stop reason: HOSPADM

## 2024-09-06 RX ORDER — MEPERIDINE HYDROCHLORIDE 25 MG/ML
12.5 INJECTION INTRAMUSCULAR; INTRAVENOUS; SUBCUTANEOUS
Status: DISCONTINUED | OUTPATIENT
Start: 2024-09-06 | End: 2024-09-06 | Stop reason: HOSPADM

## 2024-09-06 RX ORDER — SODIUM CHLORIDE, SODIUM LACTATE, POTASSIUM CHLORIDE, CALCIUM CHLORIDE 600; 310; 30; 20 MG/100ML; MG/100ML; MG/100ML; MG/100ML
INJECTION, SOLUTION INTRAVENOUS CONTINUOUS PRN
Status: DISCONTINUED | OUTPATIENT
Start: 2024-09-06 | End: 2024-09-06

## 2024-09-06 RX ORDER — CHLORHEXIDINE GLUCONATE ORAL RINSE 1.2 MG/ML
15 SOLUTION DENTAL ONCE
Status: DISCONTINUED | OUTPATIENT
Start: 2024-09-06 | End: 2024-09-06 | Stop reason: HOSPADM

## 2024-09-06 RX ORDER — ONDANSETRON 2 MG/ML
INJECTION INTRAMUSCULAR; INTRAVENOUS AS NEEDED
Status: DISCONTINUED | OUTPATIENT
Start: 2024-09-06 | End: 2024-09-06

## 2024-09-06 RX ORDER — ONDANSETRON 2 MG/ML
4 INJECTION INTRAMUSCULAR; INTRAVENOUS EVERY 6 HOURS PRN
Status: DISCONTINUED | OUTPATIENT
Start: 2024-09-06 | End: 2024-09-06 | Stop reason: HOSPADM

## 2024-09-06 RX ORDER — CHLORHEXIDINE GLUCONATE 40 MG/ML
SOLUTION TOPICAL DAILY PRN
Status: DISCONTINUED | OUTPATIENT
Start: 2024-09-06 | End: 2024-09-06 | Stop reason: HOSPADM

## 2024-09-06 RX ORDER — HYDROMORPHONE HCL/PF 1 MG/ML
SYRINGE (ML) INJECTION AS NEEDED
Status: DISCONTINUED | OUTPATIENT
Start: 2024-09-06 | End: 2024-09-06

## 2024-09-06 RX ORDER — ONDANSETRON 2 MG/ML
4 INJECTION INTRAMUSCULAR; INTRAVENOUS ONCE AS NEEDED
Status: DISCONTINUED | OUTPATIENT
Start: 2024-09-06 | End: 2024-09-06 | Stop reason: HOSPADM

## 2024-09-06 RX ORDER — METOCLOPRAMIDE HYDROCHLORIDE 5 MG/ML
10 INJECTION INTRAMUSCULAR; INTRAVENOUS ONCE AS NEEDED
Status: DISCONTINUED | OUTPATIENT
Start: 2024-09-06 | End: 2024-09-06 | Stop reason: HOSPADM

## 2024-09-06 RX ORDER — EPHEDRINE SULFATE 50 MG/ML
INJECTION INTRAVENOUS AS NEEDED
Status: DISCONTINUED | OUTPATIENT
Start: 2024-09-06 | End: 2024-09-06

## 2024-09-06 RX ADMIN — FENTANYL CITRATE 50 MCG: 50 INJECTION, SOLUTION INTRAMUSCULAR; INTRAVENOUS at 09:28

## 2024-09-06 RX ADMIN — EPHEDRINE SULFATE 5 MG: 50 INJECTION INTRAVENOUS at 09:24

## 2024-09-06 RX ADMIN — CEFAZOLIN SODIUM 2000 MG: 2 SOLUTION INTRAVENOUS at 09:23

## 2024-09-06 RX ADMIN — CEFAZOLIN SODIUM 1000 MG: 1 SOLUTION INTRAVENOUS at 09:32

## 2024-09-06 RX ADMIN — LIDOCAINE HYDROCHLORIDE 50 MG: 10 INJECTION, SOLUTION EPIDURAL; INFILTRATION; INTRACAUDAL; PERINEURAL at 09:20

## 2024-09-06 RX ADMIN — HYDROMORPHONE HYDROCHLORIDE 0.5 MG: 1 INJECTION, SOLUTION INTRAMUSCULAR; INTRAVENOUS; SUBCUTANEOUS at 10:01

## 2024-09-06 RX ADMIN — DEXAMETHASONE SODIUM PHOSPHATE 10 MG: 10 INJECTION, SOLUTION INTRAMUSCULAR; INTRAVENOUS at 09:20

## 2024-09-06 RX ADMIN — ONDANSETRON 4 MG: 2 INJECTION, SOLUTION INTRAMUSCULAR; INTRAVENOUS at 10:55

## 2024-09-06 RX ADMIN — PROPOFOL 200 MG: 10 INJECTION, EMULSION INTRAVENOUS at 09:20

## 2024-09-06 RX ADMIN — FENTANYL CITRATE 50 MCG: 50 INJECTION, SOLUTION INTRAMUSCULAR; INTRAVENOUS at 09:40

## 2024-09-06 RX ADMIN — SODIUM CHLORIDE, SODIUM LACTATE, POTASSIUM CHLORIDE, AND CALCIUM CHLORIDE: .6; .31; .03; .02 INJECTION, SOLUTION INTRAVENOUS at 09:04

## 2024-09-06 RX ADMIN — ONDANSETRON 4 MG: 2 INJECTION INTRAMUSCULAR; INTRAVENOUS at 09:20

## 2024-09-06 NOTE — ANESTHESIA PREPROCEDURE EVALUATION
Procedure:  ARTHROSCOPY KNEE WITH PARTIAL MEDIAL AND LATERAL MENISCECTOMIES (Right: Knee)    Relevant Problems   CARDIO   (+) Deep vein thrombosis (DVT) of proximal lower extremity (HCC)   (+) Essential hypertension      /RENAL   (+) Prostate cancer (HCC)      MUSCULOSKELETAL   (+) Lumbar spondylosis   (+) Primary osteoarthritis of left hip   (+) Primary osteoarthritis of right knee      NEURO/PSYCH   (+) Chronic pain syndrome      Orthopedic/Musculoskeletal   (+) Bilateral carpal tunnel syndrome   (+) Meralgia paraesthetica, left      Other   (+) Obesity, morbid (HCC)        Physical Exam    Airway    Mallampati score: I  TM Distance: >3 FB  Neck ROM: full     Dental       Cardiovascular  Cardiovascular exam normal    Pulmonary  Pulmonary exam normal     Other Findings        Anesthesia Plan  ASA Score- 3     Anesthesia Type- general with ASA Monitors.         Additional Monitors:     Airway Plan: LMA.           Plan Factors-Exercise tolerance (METS): >4 METS.    Chart reviewed. EKG reviewed. Imaging results reviewed. Existing labs reviewed. Patient summary reviewed.    Patient is not a current smoker.  Patient did not smoke on day of surgery.    Obstructive sleep apnea risk education given perioperatively.        Induction- intravenous.    Postoperative Plan- Plan for postoperative opioid use. Planned trial extubation        Informed Consent- Anesthetic plan and risks discussed with patient.  I personally reviewed this patient with the CRNA. Discussed and agreed on the Anesthesia Plan with the CRNA..

## 2024-09-06 NOTE — ANESTHESIA POSTPROCEDURE EVALUATION
Post-Op Assessment Note    CV Status:  Stable  Pain Score: 0    Pain management: adequate       Mental Status:  Alert and awake   Hydration Status:  Euvolemic   PONV Controlled:  Controlled   Airway Patency:  Patent  Two or more mitigation strategies used for obstructive sleep apnea   Post Op Vitals Reviewed: Yes    No anethesia notable event occurred.    Staff: FRANKLIN               /70 (09/06/24 1035)    Temp (!) 97.4 °F (36.3 °C) (09/06/24 1035)    Pulse (!) 54 (09/06/24 1035)   Resp 16 (09/06/24 1035)    SpO2 95 % (09/06/24 1035)

## 2024-09-06 NOTE — DISCHARGE INSTR - AVS FIRST PAGE
The principal surgical findings in your knee were:    1. Right knee medial and lateral meniscus tears    2. Right knee anteromedial plica      The following corrective procedures were performed:     Arthroscopic resection anteromedial plica    2. Arthroscopic partial meniscectomy         FOLLOW-UP:     You will need an appt. in: As scheduled   Please call the office at [phone].     GENERAL INSTRUCTIONS:     Apply an ice pack to your knee for the next 12-24 hours.    If crutches were prescribed, you should limit weight bearing at all times as instructed. Keep knee stiff the first day, avoid too much bending.    Take it easy for at least 24 hours. Do not drive for 3-5 days. You may move about, but stay around home or hotel.   If you have an upset stomach, take only cool, clear liquids, such as Gatorade, Jello, or Ginger ale. If nausea persists for more than 25 hours, notify my office.    Low grade temperature is not uncommon after surgery. However, if your temperature exceeds 101 degrees, please notify my office.    The Novocain in your knee will keep your knee numb until tonight. When the medicine wears off, you will feel pain for several days to one week. This is normal after arthroscopic surgery.   On the day after surgery, you may walk normally and bend the knee normally.          You may continue using a cane or crutches to minimize any discomfort the first 24 to 48 hours.    It is normal to have swelling and discomfort in the knee for several days to one week after arthroscopic surgery.    Resume Xarelto.         To reduce pain and swelling, place several pillows under your knee for the first 24 to 48 hours. The knee should be elevated above the heart. Ice should be applied to the knee during this period and this will help decrease discomfort and swelling. Apply to the knee for 30 minutes every 2 hours.         Any prescription you received before surgery or after surgery should be filled immediately, and  taken according to directions on the label. Taking the medicine with food or with a glass of milk will avoid stomach upset.         Weight bearing as per instructions from the surgeon.    EXERCISES:      Begin doing gentle exercises right away. Exercising will reduce the swelling, increase motion, and prevent muscle weakness. The following exercises should be performed during the first week and a half, following surgery.   The advanced knee exercise program will be started when you are seen in the office.       1. QUAD SETS: Straighten as straight as possible and then clench the thigh muscles tightly. Keep the muscles clenched tightly to the slow count of 3 then relax. Repeat this exercise 10-30 times every hour.    2. STRAIGHT LEG RAISING: With the knee held straight and the quadriceps muscle contracted, raise your leg up 6 to 8 inches and hold it to the slow count of 3. Repeat this exercise 10-30 times every hour.    3. RANGE OF MOTION: You should start bending your knee to the point of pain and increase bending it until full motion is obtained. Repeat this exercise 10-30 times every hour.        For the first 48 hours inhale deeply and hold your breath for 3 seconds; exhale completely. Repeat 10 times, 4 times daily.    If you smoke, avoid cigarettes for 48 hours.     BANDAGES:      Your bandage may show blood stains within 1-12 hours. This is motly fluid that was used to irrigate your knee, slightly tinged with blood. It is no cause for concern. However, if your bandage becomes saturated, notify my office right away.         You may remove the bulky dressings in 2 days and applied band aids to the small skin incisions. You may shower in 3 days after surgery.    WORK:   Plan to take 3 or 4 days off from work. You can resume work when you are comfortable. (This can be a week or more depending on the type of work you do). Do not walk or stand for excessive periods. Do not operate heavy machinery that requires  pedals.     EMS/Patient

## 2024-09-06 NOTE — OP NOTE
OPERATIVE REPORT  PATIENT NAME: Ayad Brambila    :  1940  MRN: [id number]  Pt Location:  OR ROOM 02    SURGERY DATE: 2024    Surgeons and Role:     * Hermelinda Avila MD - Primary     * Silvia Hughes PA-C    Preop Diagnosis:  Acute medial meniscus tear of right knee, initial encounter [S83.241A]  Acute lateral meniscus tear of right knee, initial encounter [S83.281A]    Post-Op Diagnosis Codes:     * Acute medial meniscus tear of right knee, initial encounter [S83.241A]     * Acute lateral meniscus tear of right knee, initial encounter [S83.281A]     * Anteromedial plica    Procedure(s):  Right - ARTHROSCOPY KNEE WITH PARTIAL MEDIAL AND LATERAL MENISCECTOMIES. Resection of Anteromedial Plica    Specimen(s):  * No specimens in log *    Estimated Blood Loss:   Minimal    Drains:  Urethral Catheter (Active)   Number of days: 2195       Urethral Catheter Non-latex 12 Fr. (Active)   Number of days: 2160       Anesthesia Type:   General    Operative Indications:  Acute medial meniscus tear of right knee, initial encounter [S83.241A]  Acute lateral meniscus tear of right knee, initial encounter [S83.281A]    * No implants in log *    Indications: Ayad Brambila is a 84 y.o. years old male diagnosed with right knee medial and lateral meniscus tear. Patient failed conservative treatments and elected to proceed with surgical intervention. The risks and complications are discussed with the patient. The patient consented to the procedure.    Procedure: Patient was brought into the OR and placed in supine position. Patient was anesthetized and intubated with LMA without any complications. Patient's right knee was prep and draped in sterile fashion with tourniquet in the upper thigh. A time out was call and identified the right knee was the operating site. 3 portals were utilized for instrumentation.  Each portal was injected with 1-2 cc of Marcaine 0.5% with epinephrine.  A superior-lateral protal was use for the  inflow which is set to 30 mmHg. The scope was introduced into the knee from the lateral portal. Inspection of the knee was carried out in clockwise fashion. A medial protal was also created for instrumentation. Patellofemoral joint showed grade 2-3 degenerative changes. Anterior medial plica was present and thicken. Medial compartment showed grade 2-3 degenerative changes. Medial meniscus was torn in the posterior horn to mid-body. ACL and PCL were intact. Lateral compartment showed grade 1-2 degenerative changes. Lateral meniscus was torn in the anterior horn.    Partial lateral meniscectomy was done back to the stable rim.  Attention was turn to medial compartment and partial medial meniscectomy was done back to the stable rim.  Anteromedial plica was also resected.  Using mechanical shaver to carry out the procedure. Excess fluid was drain out of the knee 25 cc of 0.5% Marcaine with epinephrine was injected into the knee joint for post-op pain control. All counts were correct.    The incisions were closed with Nylon. A sterile bulky dressing was applied. The patient tolerated the procedure well without any complications. Patient was then extubated and transferred to recovery room for post-op care. The family was contacted.    There was no qualified resident available to assist.    Mrs. Hughes was required in the OR in helping performing the minimal invasive arthroscopic techniques in meniscectomy by manipulating the knee and clearing the surgical field for better visualization, as well as assisting in utilizing the shaver, control the camera, and biter.      Complications:   None    Patient Disposition:  PACU     SIGNATURE: Hermelinda Avila MD  DATE: September 6, 2024  TIME: 10:05 AM

## 2024-09-06 NOTE — INTERVAL H&P NOTE
H&P reviewed. After examining the patient I find no changes in the patients condition since the H&P had been written.    Vitals:    09/06/24 0803   BP: 128/79   Pulse: 65   Resp: 18   Temp: 97.5 °F (36.4 °C)   SpO2: 95%

## 2024-09-11 ENCOUNTER — TELEPHONE (OUTPATIENT)
Dept: ADMINISTRATIVE | Facility: OTHER | Age: 84
End: 2024-09-11

## 2024-09-11 NOTE — TELEPHONE ENCOUNTER
09/11/24 12:24 PM    Patient contacted to bring Advance Directive, POLST, or Living Will document to next scheduled pcp visit.[facility] Department spoke with patient/ caregiver.    Thank you.  Lydia Plasencia  [facility]

## 2024-09-18 ENCOUNTER — OFFICE VISIT (OUTPATIENT)
Dept: FAMILY MEDICINE CLINIC | Facility: HOSPITAL | Age: 84
End: 2024-09-18
Payer: COMMERCIAL

## 2024-09-18 VITALS
BODY MASS INDEX: 41.01 KG/M2 | WEIGHT: 270.6 LBS | OXYGEN SATURATION: 98 % | HEART RATE: 68 BPM | SYSTOLIC BLOOD PRESSURE: 117 MMHG | DIASTOLIC BLOOD PRESSURE: 70 MMHG | HEIGHT: 68 IN | TEMPERATURE: 98.1 F

## 2024-09-18 DIAGNOSIS — N39.3 SUI (STRESS URINARY INCONTINENCE), MALE: ICD-10-CM

## 2024-09-18 DIAGNOSIS — I82.4Y1 DEEP VEIN THROMBOSIS (DVT) OF PROXIMAL VEIN OF RIGHT LOWER EXTREMITY, UNSPECIFIED CHRONICITY (HCC): ICD-10-CM

## 2024-09-18 DIAGNOSIS — S83.241D ACUTE MEDIAL MENISCUS TEAR OF RIGHT KNEE, SUBSEQUENT ENCOUNTER: ICD-10-CM

## 2024-09-18 DIAGNOSIS — C61 PROSTATE CANCER (HCC): ICD-10-CM

## 2024-09-18 DIAGNOSIS — Z00.00 MEDICARE ANNUAL WELLNESS VISIT, SUBSEQUENT: Primary | ICD-10-CM

## 2024-09-18 DIAGNOSIS — I10 ESSENTIAL HYPERTENSION: ICD-10-CM

## 2024-09-18 PROCEDURE — 99214 OFFICE O/P EST MOD 30 MIN: CPT | Performed by: FAMILY MEDICINE

## 2024-09-18 PROCEDURE — G0439 PPPS, SUBSEQ VISIT: HCPCS | Performed by: FAMILY MEDICINE

## 2024-09-18 NOTE — PATIENT INSTRUCTIONS
Medicare Preventive Visit Patient Instructions  Thank you for completing your Welcome to Medicare Visit or Medicare Annual Wellness Visit today. Your next wellness visit will be due in one year (9/19/2025).  The screening/preventive services that you may require over the next 5-10 years are detailed below. Some tests may not apply to you based off risk factors and/or age. Screening tests ordered at today's visit but not completed yet may show as past due. Also, please note that scanned in results may not display below.  Preventive Screenings:  Service Recommendations Previous Testing/Comments   Colorectal Cancer Screening  Colonoscopy    Fecal Occult Blood Test (FOBT)/Fecal Immunochemical Test (FIT)  Fecal DNA/Cologuard Test  Flexible Sigmoidoscopy Age: 45-75 years old   Colonoscopy: every 10 years (May be performed more frequently if at higher risk)  OR  FOBT/FIT: every 1 year  OR  Cologuard: every 3 years  OR  Sigmoidoscopy: every 5 years  Screening may be recommended earlier than age 45 if at higher risk for colorectal cancer. Also, an individualized decision between you and your healthcare provider will decide whether screening between the ages of 76-85 would be appropriate. Colonoscopy: Not on file  FOBT/FIT: Not on file  Cologuard: Not on file  Sigmoidoscopy: Not on file          Prostate Cancer Screening Individualized decision between patient and health care provider in men between ages of 55-69   Medicare will cover every 12 months beginning on the day after your 50th birthday PSA: No results in last 5 years     History Prostate Cancer  Screening Not Indicated     Hepatitis C Screening Once for adults born between 1945 and 1965  More frequently in patients at high risk for Hepatitis C Hep C Antibody: Not on file        Diabetes Screening 1-2 times per year if you're at risk for diabetes or have pre-diabetes Fasting glucose: 96 mg/dL (3/13/2024)  A1C: No results in last 5 years (No results in last 5  years)  Screening Current   Cholesterol Screening Once every 5 years if you don't have a lipid disorder. May order more often based on risk factors. Lipid panel: 03/13/2024  Screening Current      Other Preventive Screenings Covered by Medicare:  Abdominal Aortic Aneurysm (AAA) Screening: covered once if your at risk. You're considered to be at risk if you have a family history of AAA or a male between the age of 65-75 who smoking at least 100 cigarettes in your lifetime.  Lung Cancer Screening: covers low dose CT scan once per year if you meet all of the following conditions: (1) Age 55-77; (2) No signs or symptoms of lung cancer; (3) Current smoker or have quit smoking within the last 15 years; (4) You have a tobacco smoking history of at least 20 pack years (packs per day x number of years you smoked); (5) You get a written order from a healthcare provider.  Glaucoma Screening: covered annually if you're considered high risk: (1) You have diabetes OR (2) Family history of glaucoma OR (3)  aged 50 and older OR (4)  American aged 65 and older  Osteoporosis Screening: covered every 2 years if you meet one of the following conditions: (1) Have a vertebral abnormality; (2) On glucocorticoid therapy for more than 3 months; (3) Have primary hyperparathyroidism; (4) On osteoporosis medications and need to assess response to drug therapy.  HIV Screening: covered annually if you're between the age of 15-65. Also covered annually if you are younger than 15 and older than 65 with risk factors for HIV infection. For pregnant patients, it is covered up to 3 times per pregnancy.    Immunizations:  Immunization Recommendations   Influenza Vaccine Annual influenza vaccination during flu season is recommended for all persons aged >= 6 months who do not have contraindications   Pneumococcal Vaccine   * Pneumococcal conjugate vaccine = PCV13 (Prevnar 13), PCV15 (Vaxneuvance), PCV20 (Prevnar 20)  *  Pneumococcal polysaccharide vaccine = PPSV23 (Pneumovax) Adults 19-63 yo with certain risk factors or if 65+ yo  If never received any pneumonia vaccine: recommend Prevnar 20 (PCV20)  Give PCV20 if previously received 1 dose of PCV13 or PPSV23   Hepatitis B Vaccine 3 dose series if at intermediate or high risk (ex: diabetes, end stage renal disease, liver disease)   Respiratory syncytial virus (RSV) Vaccine - COVERED BY MEDICARE PART D  * RSVPreF3 (Arexvy) CDC recommends that adults 60 years of age and older may receive a single dose of RSV vaccine using shared clinical decision-making (SCDM)   Tetanus (Td) Vaccine - COST NOT COVERED BY MEDICARE PART B Following completion of primary series, a booster dose should be given every 10 years to maintain immunity against tetanus. Td may also be given as tetanus wound prophylaxis.   Tdap Vaccine - COST NOT COVERED BY MEDICARE PART B Recommended at least once for all adults. For pregnant patients, recommended with each pregnancy.   Shingles Vaccine (Shingrix) - COST NOT COVERED BY MEDICARE PART B  2 shot series recommended in those 19 years and older who have or will have weakened immune systems or those 50 years and older     Health Maintenance Due:  There are no preventive care reminders to display for this patient.  Immunizations Due:      Topic Date Due   • COVID-19 Vaccine (6 - 2023-24 season) 09/01/2024   • Influenza Vaccine (1) 09/01/2024     Advance Directives   What are advance directives?  Advance directives are legal documents that state your wishes and plans for medical care. These plans are made ahead of time in case you lose your ability to make decisions for yourself. Advance directives can apply to any medical decision, such as the treatments you want, and if you want to donate organs.   What are the types of advance directives?  There are many types of advance directives, and each state has rules about how to use them. You may choose a combination of any of  the following:  Living will:  This is a written record of the treatment you want. You can also choose which treatments you do not want, which to limit, and which to stop at a certain time. This includes surgery, medicine, IV fluid, and tube feedings.   Durable power of  for healthcare (DPAHC):  This is a written record that states who you want to make healthcare choices for you when you are unable to make them for yourself. This person, called a proxy, is usually a family member or a friend. You may choose more than 1 proxy.  Do not resuscitate (DNR) order:  A DNR order is used in case your heart stops beating or you stop breathing. It is a request not to have certain forms of treatment, such as CPR. A DNR order may be included in other types of advance directives.  Medical directive:  This covers the care that you want if you are in a coma, near death, or unable to make decisions for yourself. You can list the treatments you want for each condition. Treatment may include pain medicine, surgery, blood transfusions, dialysis, IV or tube feedings, and a ventilator (breathing machine).  Values history:  This document has questions about your views, beliefs, and how you feel and think about life. This information can help others choose the care that you would choose.  Why are advance directives important?  An advance directive helps you control your care. Although spoken wishes may be used, it is better to have your wishes written down. Spoken wishes can be misunderstood, or not followed. Treatments may be given even if you do not want them. An advance directive may make it easier for your family to make difficult choices about your care.   Weight Management   Why it is important to manage your weight:  Being overweight increases your risk of health conditions such as heart disease, high blood pressure, type 2 diabetes, and certain types of cancer. It can also increase your risk for osteoarthritis, sleep apnea,  and other respiratory problems. Aim for a slow, steady weight loss. Even a small amount of weight loss can lower your risk of health problems.  How to lose weight safely:  A safe and healthy way to lose weight is to eat fewer calories and get regular exercise. You can lose up about 1 pound a week by decreasing the number of calories you eat by 500 calories each day.   Healthy meal plan for weight management:  A healthy meal plan includes a variety of foods, contains fewer calories, and helps you stay healthy. A healthy meal plan includes the following:  Eat whole-grain foods more often.  A healthy meal plan should contain fiber. Fiber is the part of grains, fruits, and vegetables that is not broken down by your body. Whole-grain foods are healthy and provide extra fiber in your diet. Some examples of whole-grain foods are whole-wheat breads and pastas, oatmeal, brown rice, and bulgur.  Eat a variety of vegetables every day.  Include dark, leafy greens such as spinach, kale, sapna greens, and mustard greens. Eat yellow and orange vegetables such as carrots, sweet potatoes, and winter squash.   Eat a variety of fruits every day.  Choose fresh or canned fruit (canned in its own juice or light syrup) instead of juice. Fruit juice has very little or no fiber.  Eat low-fat dairy foods.  Drink fat-free (skim) milk or 1% milk. Eat fat-free yogurt and low-fat cottage cheese. Try low-fat cheeses such as mozzarella and other reduced-fat cheeses.  Choose meat and other protein foods that are low in fat.  Choose beans or other legumes such as split peas or lentils. Choose fish, skinless poultry (chicken or turkey), or lean cuts of red meat (beef or pork). Before you cook meat or poultry, cut off any visible fat.   Use less fat and oil.  Try baking foods instead of frying them. Add less fat, such as margarine, sour cream, regular salad dressing and mayonnaise to foods. Eat fewer high-fat foods. Some examples of high-fat foods  include french fries, doughnuts, ice cream, and cakes.  Eat fewer sweets.  Limit foods and drinks that are high in sugar. This includes candy, cookies, regular soda, and sweetened drinks.  Exercise:  Exercise at least 30 minutes per day on most days of the week. Some examples of exercise include walking, biking, dancing, and swimming. You can also fit in more physical activity by taking the stairs instead of the elevator or parking farther away from stores. Ask your healthcare provider about the best exercise plan for you.      © Copyright [employer] 2018 Information is for End User's use only and may not be sold, redistributed or otherwise used for commercial purposes. All illustrations and images included in CareNotes® are the copyrighted property of A.D.A.M., Inc. or [employer]

## 2024-09-18 NOTE — PROGRESS NOTES
Ambulatory Visit  Name: Ayad Brambila      : 1940      MRN: [id number]  Encounter Provider: Narinder Renteria MD  Encounter Date: 2024   Encounter department: [facility] PRIMARY [facility] [address]     Assessment & Plan  Medicare annual wellness visit, subsequent         AKIRA (stress urinary incontinence), male  Patient with known stress urinary incontinence which was related to prostate cancer in the past.  He would like to go ahead and establish care with [facility] urology.  He was following up with [facility] urology but changing doctors.    No medications at this time.  He generally uses a condom cath.  Orders:    Ambulatory Referral to Urology; Future    Prostate cancer (HCC)  Follow-up with urology.  PSA was 0.11.  Orders:    Ambulatory Referral to Urology; Future    Essential hypertension  Stable.  Continue the same.       Deep vein thrombosis (DVT) of proximal vein of right lower extremity, unspecified chronicity (HCC)  Continues with Xarelto 20 mg daily.  No active bleeding.  No signs of bleeding.       Acute medial meniscus tear of right knee, subsequent encounter  Doing well postop.  Minimal pain.  No signs of DVT.          Preventive health issues were discussed with patient, and age appropriate screening tests were ordered as noted in patient's After Visit Summary. Personalized health advice and appropriate referrals for health education or preventive services given if needed, as noted in patient's After Visit Summary.    History of Present Illness     Tutu is here for follow-up of chronic conditions and annual wellness visit.    Since the last visit he underwent surgery regarding acute tear of the meniscus.  He reports he has done well postoperatively.  He has follow-up with orthopedics tomorrow.  No significant leg swelling.  No significant pain.  Overall feeling well.       Patient Care Team:  Narinder Renteria MD as PCP - General (Family Medicine)  Ashutosh Holloway MD as PCP -  [facility] ([facility])  Hermelinda Avila MD    Review of Systems   Constitutional: Negative.  Negative for activity change, appetite change, chills and diaphoresis.   HENT:  Negative for congestion and dental problem.    Respiratory: Negative.  Negative for apnea, chest tightness, shortness of breath and wheezing.    Cardiovascular: Negative.  Negative for chest pain, palpitations and leg swelling.   Gastrointestinal: Negative.  Negative for abdominal distention, abdominal pain, constipation, diarrhea and nausea.   Genitourinary: Negative.  Negative for difficulty urinating, dysuria and frequency.     Medical History Reviewed by provider this encounter:       Annual Wellness Visit Questionnaire   Ayad is here for his Subsequent Wellness visit.     Health Risk Assessment:   Patient rates overall health as very good. Patient feels that their physical health rating is slightly better. Patient is satisfied with their life. Eyesight was rated as same. Hearing was rated as same. Patient feels that their emotional and mental health rating is same. Patients states they are sometimes angry. Patient states they are sometimes unusually tired/fatigued. Pain experienced in the last 7 days has been some. Patient's pain rating has been 2/10. Patient states that he has experienced no weight loss or gain in last 6 months.     Depression Screening:   PHQ-2 Score: 1      Fall Risk Screening:   In the past year, patient has experienced: no history of falling in past year      Home Safety:  Patient does not have trouble with stairs inside or outside of their home. Patient has working smoke alarms and has working carbon monoxide detector. Home safety hazards include: none.     Nutrition:   Current diet is Regular.     Medications:   Patient is not currently taking any over-the-counter supplements. Patient is able to manage medications.     Activities of Daily Living (ADLs)/Instrumental Activities of Daily Living (IADLs):   Walk and  transfer into and out of bed and chair?: Yes  Dress and groom yourself?: Yes    Bathe or shower yourself?: Yes    Feed yourself? Yes  Do your laundry/housekeeping?: Yes  Manage your money, pay your bills and track your expenses?: Yes  Make your own meals?: Yes    Do your own shopping?: Yes    Previous Hospitalizations:   Any hospitalizations or ED visits within the last 12 months?: Yes    How many hospitalizations have you had in the last year?: 1-2    Advance Care Planning:   Living will: Yes    Durable POA for healthcare: Yes    Advanced directive: Yes      Cognitive Screening:   Provider or family/friend/caregiver concerned regarding cognition?: No    PREVENTIVE SCREENINGS      Cardiovascular Screening:    General: Screening Current      Diabetes Screening:     General: Screening Current      Colorectal Cancer Screening:     General: Screening Not Indicated      Prostate Cancer Screening:    General: History Prostate Cancer and Screening Not Indicated      Osteoporosis Screening:    General: Screening Not Indicated      Abdominal Aortic Aneurysm (AAA) Screening:    Risk factors include: tobacco use        General: Screening Not Indicated      Lung Cancer Screening:     General: Screening Not Indicated      Hepatitis C Screening:    General: Screening Not Indicated    Screening, Brief Intervention, and Referral to Treatment (SBIRT)    Screening  Typical number of drinks in a day: 0  Typical number of drinks in a week: 0  Interpretation: Low risk drinking behavior.    Single Item Drug Screening:  How often have you used an illegal drug (including marijuana) or a prescription medication for non-medical reasons in the past year? never    Single Item Drug Screen Score: 0  Interpretation: Negative screen for possible drug use disorder    Social Determinants of Health     Financial Resource Strain: Low Risk  (9/15/2023)    Overall Financial Resource Strain (CARDIA)     Difficulty of Paying Living Expenses: Not hard at  all   Transportation Needs: No Transportation Needs (9/15/2023)    PRAPARE - Transportation     Lack of Transportation (Medical): No     Lack of Transportation (Non-Medical): No     No results found.    Objective     There were no vitals taken for this visit.    Physical Exam  Vitals and nursing note reviewed.   Constitutional:       General: He is not in acute distress.     Appearance: He is well-developed. He is not ill-appearing.   HENT:      Head: Normocephalic and atraumatic.      Right Ear: Tympanic membrane, ear canal and external ear normal.      Left Ear: Tympanic membrane, ear canal and external ear normal.      Mouth/Throat:      Mouth: Mucous membranes are moist.      Pharynx: Oropharynx is clear.   Eyes:      Extraocular Movements: Extraocular movements intact.      Conjunctiva/sclera: Conjunctivae normal.      Pupils: Pupils are equal, round, and reactive to light.   Cardiovascular:      Rate and Rhythm: Normal rate and regular rhythm.      Heart sounds: Normal heart sounds. No murmur heard.  Pulmonary:      Effort: Pulmonary effort is normal.      Breath sounds: Normal breath sounds.   Abdominal:      General: Bowel sounds are normal. There is no distension.      Palpations: Abdomen is soft. There is no mass.      Tenderness: There is no abdominal tenderness. There is no guarding.      Hernia: No hernia is present.   Musculoskeletal:         General: Normal range of motion.      Cervical back: Normal range of motion and neck supple.   Skin:     General: Skin is warm and dry.      Capillary Refill: Capillary refill takes less than 2 seconds.   Neurological:      General: No focal deficit present.      Mental Status: He is alert and oriented to person, place, and time.   Psychiatric:         Mood and Affect: Mood normal.         Behavior: Behavior normal.

## 2024-09-18 NOTE — ASSESSMENT & PLAN NOTE
Patient with known stress urinary incontinence which was related to prostate cancer in the past.  He would like to go ahead and establish care with [facility] urology.  He was following up with [facility] urology but changing doctors.    No medications at this time.  He generally uses a condom cath.  Orders:    Ambulatory Referral to Urology; Future

## 2024-09-19 ENCOUNTER — OFFICE VISIT (OUTPATIENT)
Dept: OBGYN CLINIC | Facility: CLINIC | Age: 84
End: 2024-09-19

## 2024-09-19 VITALS
WEIGHT: 271 LBS | HEIGHT: 68 IN | DIASTOLIC BLOOD PRESSURE: 72 MMHG | SYSTOLIC BLOOD PRESSURE: 120 MMHG | BODY MASS INDEX: 41.07 KG/M2

## 2024-09-19 DIAGNOSIS — R20.0 NUMBNESS OF RIGHT HAND: ICD-10-CM

## 2024-09-19 DIAGNOSIS — Z47.89 AFTERCARE FOLLOWING SURGERY OF THE MUSCULOSKELETAL SYSTEM: Primary | ICD-10-CM

## 2024-09-19 PROCEDURE — 99024 POSTOP FOLLOW-UP VISIT: CPT | Performed by: ORTHOPAEDIC SURGERY

## 2024-09-19 NOTE — PROGRESS NOTES
Assessment:     1. Aftercare following surgery of the musculoskeletal system    2. Numbness of right hand        Plan:     Problem List Items Addressed This Visit          Orthopedic/Musculoskeletal    Aftercare following surgery of the musculoskeletal system - Primary       Neurology/Sleep    Numbness of right hand    Relevant Orders    US MSK dena Lion is doing well s/p right knee arthroscopy with partial medial and lateral meniscectomies on 9/6/24. Sutures were removed and arthroscopy pictures were reviewed with the patient.  Recommend low impact exercises such as stationary bicycle or swimming.  Avoid repetitive squatting, kneeling and climbing.  Continue Tylenol and icing as needed for pain.  Findings also consistent with right hand numbness and tingling, most likely carpal tunnel syndrome.  Findings and treatment options were discussed with the patient.  He has tried and failed conservative treatment of nighttime bracing.  We will go ahead and order an ultrasound of the right wrist to evaluate for carpal tunnel syndrome.  He will follow-up after the ultrasound and I will go over further treatment recommendations at that time.  All patient's questions were answered to his satisfaction.  This note is created using dictation transcription.  It may contain typographical errors, grammatical errors, improperly dictated words, background noise and other errors.    Subjective:     Patient ID: Ayad Brambila is a 84 y.o. male.  Chief Complaint:  This is an 84-year-old white male who is status post right knee arthroscopy with partial medial and lateral meniscectomies on September 6, 2024.  He arrives ambulating with no assistance.  He feels his symptoms are much improved compared to prior to surgery.  He has some generalized aching pain especially medially that occurs mostly at nighttime.  He denies any fevers or chills.  He is also complaining of right hand numbness and tingling for over a year.  He states he  feels weakness in his hands and drops objects due to weight.  The numbness is worse at nighttime.  He is using a brace with no improvement.  He does have a history of a carpal tunnel release on the left side.  No other treatment.  He denies any significant cervical spine pain and no pain, numbness or tingling radiating from the cervical spine.    Allergy:  No Known Allergies  Medications:  all current active meds have been reviewed  Past Medical History:  Past Medical History:   Diagnosis Date    Arthritis     Bladder neck contracture     Cancer (HCC)     ED (erectile dysfunction)     Frequency of urination     Hypercholesterolemia     Hypertension     17    Nocturia     PONV (postoperative nausea and vomiting)     Prostate cancer (HCC)     16    Stress incontinence      Past Surgical History:  Past Surgical History:   Procedure Laterality Date    BLADDER SURGERY      Bladder Neck Restriction, SP Catheter    CARPAL TUNNEL RELEASE      CHOLECYSTECTOMY      CYSTOSTOMY W/ BLADDER DILATION      [place] ARTH[place] KNE SURG W/MENISCECTOMY MED/LAT W/SHVG Right 2024    Procedure: ARTHROSCOPY KNEE WITH PARTIAL MEDIAL AND LATERAL MENISCECTOMIES, Resection of Interomedial Plica;  Surgeon: Hermelinda Avila MD;  Location: [facility] OR;  Service: Orthopedics    PROSTATECTOMY      Radical    URETHRAL SLING       Family History:  Family History   Problem Relation Age of Onset    Coronary artery disease Father         involving native coronary artery of native heart, angina presence unspecified     Social History:  Social History     Substance and Sexual Activity   Alcohol Use No     Social History     Substance and Sexual Activity   Drug Use No     Social History     Tobacco Use   Smoking Status Former    Current packs/day: 0.00    Average packs/day: 1.5 packs/day for 12.0 years (18.0 ttl pk-yrs)    Types: Cigarettes    Start date:     Quit date:     Years since quittin.7   Smokeless Tobacco Never     Review of  "Systems   Constitutional: Negative.    HENT: Negative.     Eyes: Negative.    Respiratory: Negative.     Cardiovascular: Negative.    Gastrointestinal: Negative.    Endocrine: Negative.    Genitourinary: Negative.    Musculoskeletal:  Positive for arthralgias (right knee), gait problem (antalgic) and joint swelling (right knee).   Skin: Negative.    Neurological:  Positive for numbness (right hand).   Hematological: Negative.    Psychiatric/Behavioral: Negative.           Objective:  BP Readings from Last 1 Encounters:   09/19/24 120/72      Wt Readings from Last 1 Encounters:   09/19/24 123 kg (271 lb)      BMI:   Estimated body mass index is 41.21 kg/m² as calculated from the following:    Height as of this encounter: 5' 8\" (1.727 m).    Weight as of this encounter: 123 kg (271 lb).  BSA:   Estimated body surface area is 2.33 meters squared as calculated from the following:    Height as of this encounter: 5' 8\" (1.727 m).    Weight as of this encounter: 123 kg (271 lb).   Physical Exam  Constitutional:       General: He is not in acute distress.     Appearance: He is well-developed.   HENT:      Head: Normocephalic.   Eyes:      Conjunctiva/sclera: Conjunctivae normal.      Pupils: Pupils are equal, round, and reactive to light.   Pulmonary:      Effort: Pulmonary effort is normal. No respiratory distress.   Skin:     General: Skin is warm and dry.   Neurological:      Mental Status: He is alert and oriented to person, place, and time.   Psychiatric:         Behavior: Behavior normal.       Right Knee Exam     Tenderness   The patient is experiencing no tenderness.     Range of Motion   Extension:  normal   Flexion:  110     Tests   Varus: negative Valgus: negative    Other   Erythema: absent  Scars: present (Well-healing portal sites with no evidence of infection.  No active drainage.)  Sensation: normal  Pulse: present  Swelling: mild    Comments:  Calf soft, nontender      Right Hand Exam     Tenderness   The " patient is experiencing no tenderness.     Range of Motion   The patient has normal right wrist ROM.     Muscle Strength   The patient has normal right wrist strength.    Tests   Phalen’s sign: positive  Tinel's sign (median nerve): positive    Other   Erythema: absent  Scars: absent  Sensation: decreased (Median nerve distribution)  Pulse: present            No new imaging.    Sutures removed from right knee.    Scribe Attestation      I,:  Silvia Hughes PA-C am acting as a scribe while in the presence of the attending physician.:       I,:  Hermelinda Avila MD personally performed the services described in this documentation    as scribed in my presence.:

## 2024-09-19 NOTE — ASSESSMENT & PLAN NOTE
Ayad is doing well s/p right knee arthroscopy with partial medial and lateral meniscectomies on 9/6/24. Sutures were removed and arthroscopy pictures were reviewed with the patient.

## 2024-09-25 ENCOUNTER — TELEPHONE (OUTPATIENT)
Age: 84
End: 2024-09-25

## 2024-09-25 NOTE — TELEPHONE ENCOUNTER
Pt calling to schedule appt for incontinence, hx of prostate cancer, bladder neck contracture and erectile dysfunction.    Pt has been seeing [facility]rology for the past few years and sts he is unhappy with care so he would like to be seen by [facility]. Pt scheduled for next available in [place] on 12/23/24 @ 10am.

## 2024-10-02 DIAGNOSIS — I10 ESSENTIAL HYPERTENSION: ICD-10-CM

## 2024-10-02 RX ORDER — LISINOPRIL 5 MG/1
TABLET ORAL
Qty: 90 TABLET | Refills: 1 | Status: SHIPPED | OUTPATIENT
Start: 2024-10-02

## 2024-10-16 ENCOUNTER — TELEPHONE (OUTPATIENT)
Age: 84
End: 2024-10-16

## 2024-10-16 NOTE — TELEPHONE ENCOUNTER
It can take up to 3 months to recover from this surgery. If he had a new injury that started this pain then he can come in for reevaluation. If there was no new injury then recommend continuing low impact exercises and antiinflammatories. He does have osteoarthritis which may be contributing to his symptoms as well.

## 2024-10-16 NOTE — TELEPHONE ENCOUNTER
Caller: Gee    Doctor: ERIC    Reason for call: Patient is having a set back from SX on his rt knee. Patient states his knee feel like it did before the SX. He is experiencing a burning stinging sensation.  Please advise   Thank you    Call back#: [phone]

## 2024-10-16 NOTE — TELEPHONE ENCOUNTER
I spoke to Tutu and explained Silvia's msg.  Tutu stated that he can not take antiinflammatories because he is on Eliquis.

## 2024-10-23 ENCOUNTER — HOSPITAL ENCOUNTER (OUTPATIENT)
Dept: ULTRASOUND IMAGING | Facility: HOSPITAL | Age: 84
Discharge: HOME | End: 2024-10-23
Payer: COMMERCIAL

## 2024-10-23 DIAGNOSIS — R20.0 NUMBNESS OF RIGHT HAND: ICD-10-CM

## 2024-10-23 PROCEDURE — 76882 US LMTD JT/FCL EVL NVASC XTR: CPT

## 2024-10-29 ENCOUNTER — OFFICE VISIT (OUTPATIENT)
Dept: OBGYN CLINIC | Facility: CLINIC | Age: 84
End: 2024-10-29

## 2024-10-29 VITALS
HEART RATE: 58 BPM | DIASTOLIC BLOOD PRESSURE: 72 MMHG | SYSTOLIC BLOOD PRESSURE: 144 MMHG | HEIGHT: 68 IN | BODY MASS INDEX: 41.07 KG/M2 | WEIGHT: 271 LBS

## 2024-10-29 DIAGNOSIS — G56.01 CARPAL TUNNEL SYNDROME ON RIGHT: Primary | ICD-10-CM

## 2024-10-29 PROCEDURE — 99024 POSTOP FOLLOW-UP VISIT: CPT | Performed by: ORTHOPAEDIC SURGERY

## 2024-10-29 RX ORDER — CHLORHEXIDINE GLUCONATE 40 MG/ML
SOLUTION TOPICAL DAILY PRN
OUTPATIENT
Start: 2024-10-29

## 2024-10-29 RX ORDER — BUPIVACAINE HYDROCHLORIDE 5 MG/ML
10 INJECTION, SOLUTION EPIDURAL; INTRACAUDAL ONCE
OUTPATIENT
Start: 2024-10-29 | End: 2024-10-29

## 2024-10-29 RX ORDER — LIDOCAINE HYDROCHLORIDE 10 MG/ML
10 INJECTION, SOLUTION EPIDURAL; INFILTRATION; INTRACAUDAL; PERINEURAL ONCE
OUTPATIENT
Start: 2024-10-29 | End: 2024-10-29

## 2024-10-29 RX ORDER — CHLORHEXIDINE GLUCONATE ORAL RINSE 1.2 MG/ML
15 SOLUTION DENTAL ONCE
OUTPATIENT
Start: 2024-10-29 | End: 2024-10-29

## 2024-10-29 NOTE — PROGRESS NOTES
Assessment:     1. Carpal tunnel syndrome on right        Plan:     Problem List Items Addressed This Visit          Nervous and Auditory    Carpal tunnel syndrome on right - Primary     Findings consistent with right carpal tunnel syndrome.  Findings and treatment options were discussed with the patient.  I reviewed patient's right upper extremity musculoskeletal ultrasound and report with him.  I discussed prognosis of his condition.  He has tried and failed conservative treatment of nighttime bracing.  I recommended surgical intervention with open right carpal tunnel release.  Patient would like to proceed with surgery.  All patient's questions were answered to his satisfaction.  This note is created using dictation transcription.  It may contain typographical errors, grammatical errors, improperly dictated words, background noise and other errors.    Discussed with patient surgical risks and complications including but not limited to infection, persistent pain, nerve and vessel injury, blood loss, complications associated with anesthesia, DVT, exposure to COVID virus, etc.  Patient understands the risks and complication and consented to the surgery.         Relevant Orders    Case request operating room: RELEASE CARPAL TUNNEL (Completed)      Subjective:     Patient ID: Ayad Brambila is a 84 y.o. male.  Chief Complaint:  This is an 84-year-old white male follow-up of right hand numbness and tingling for over a year.  He states he feels weakness in his hands and drops objects due to weight.  The numbness is worse at nighttime.  He is using a brace with no improvement.  He does have a history of a carpal tunnel release on the left side.  No other treatment.  He denies any significant cervical spine pain and no pain, numbness or tingling radiating from the cervical spine.  Patient is here to review his right upper extremity musculoskeletal ultrasound.    Allergy:  No Known Allergies  Medications:  all current  active meds have been reviewed  Past Medical History:  Past Medical History:   Diagnosis Date    Arthritis     Bladder neck contracture     Cancer (HCC)     ED (erectile dysfunction)     Frequency of urination     Hypercholesterolemia     Hypertension     17    Nocturia     PONV (postoperative nausea and vomiting)     Prostate cancer (HCC)     16    Stress incontinence      Past Surgical History:  Past Surgical History:   Procedure Laterality Date    BLADDER SURGERY      Bladder Neck Restriction, SP Catheter    CARPAL TUNNEL RELEASE      CHOLECYSTECTOMY      CYSTOSTOMY W/ BLADDER DILATION      [place] ARTHRS KNE SURG W/MENISCECTOMY MED/LAT W/SHVG Right 2024    Procedure: ARTHROSCOPY KNEE WITH PARTIAL MEDIAL AND LATERAL MENISCECTOMIES, Resection of Interomedial Plica;  Surgeon: Hermelinda Avila MD;  Location:  [facility] OR;  Service: Orthopedics    PROSTATECTOMY      Radical    URETHRAL SLING       Family History:  Family History   Problem Relation Age of Onset    Coronary artery disease Father         involving native coronary artery of native heart, angina presence unspecified     Social History:  Social History     Substance and Sexual Activity   Alcohol Use No     Social History     Substance and Sexual Activity   Drug Use No     Social History     Tobacco Use   Smoking Status Former    Current packs/day: 0.00    Average packs/day: 1.5 packs/day for 12.0 years (18.0 ttl pk-yrs)    Types: Cigarettes    Start date:     Quit date:     Years since quittin.8   Smokeless Tobacco Never     Review of Systems   Constitutional: Negative.    HENT: Negative.     Eyes: Negative.    Respiratory: Negative.     Cardiovascular: Negative.    Gastrointestinal: Negative.    Endocrine: Negative.    Genitourinary: Negative.    Musculoskeletal:  Positive for arthralgias (right knee) and joint swelling (right knee). Negative for gait problem.   Skin: Negative.    Neurological:  Positive for numbness (right hand).  "  Hematological: Negative.    Psychiatric/Behavioral: Negative.           Objective:  BP Readings from Last 1 Encounters:   10/29/24 144/72      Wt Readings from Last 1 Encounters:   10/29/24 123 kg (271 lb)      BMI:   Estimated body mass index is 41.21 kg/m² as calculated from the following:    Height as of this encounter: 5' 8\" (1.727 m).    Weight as of this encounter: 123 kg (271 lb).  BSA:   Estimated body surface area is 2.33 meters squared as calculated from the following:    Height as of this encounter: 5' 8\" (1.727 m).    Weight as of this encounter: 123 kg (271 lb).   Physical Exam  Vitals and nursing note reviewed.   Constitutional:       General: He is not in acute distress.     Appearance: Normal appearance. He is well-developed.   HENT:      Head: Normocephalic and atraumatic.      Right Ear: External ear normal.      Left Ear: External ear normal.   Eyes:      Extraocular Movements: Extraocular movements intact.      Conjunctiva/sclera: Conjunctivae normal.   Pulmonary:      Effort: Pulmonary effort is normal. No respiratory distress.   Musculoskeletal:      Cervical back: Neck supple.   Skin:     General: Skin is warm and dry.   Neurological:      Mental Status: He is alert and oriented to person, place, and time.   Psychiatric:         Mood and Affect: Mood normal.         Behavior: Behavior normal.       Right Hand Exam     Tenderness   The patient is experiencing no tenderness.     Range of Motion   The patient has normal right wrist ROM.     Muscle Strength   The patient has normal right wrist strength.    Tests   Phalen’s sign: positive  Tinel's sign (median nerve): positive    Other   Erythema: absent  Scars: absent  Sensation: decreased (Median nerve distribution)  Pulse: present            Right upper extremity musculoskeletal ultrasound of the wrist show positive finding for carpal tunnel syndrome  "

## 2024-10-29 NOTE — ASSESSMENT & PLAN NOTE
Findings consistent with right carpal tunnel syndrome.  Findings and treatment options were discussed with the patient.  I reviewed patient's right upper extremity musculoskeletal ultrasound and report with him.  I discussed prognosis of his condition.  He has tried and failed conservative treatment of nighttime bracing.  I recommended surgical intervention with open right carpal tunnel release.  Patient would like to proceed with surgery.  All patient's questions were answered to his satisfaction.  This note is created using dictation transcription.  It may contain typographical errors, grammatical errors, improperly dictated words, background noise and other errors.    Discussed with patient surgical risks and complications including but not limited to infection, persistent pain, nerve and vessel injury, blood loss, complications associated with anesthesia, DVT, exposure to COVID virus, etc.  Patient understands the risks and complication and consented to the surgery.

## 2024-12-02 ENCOUNTER — TELEPHONE (OUTPATIENT)
Age: 84
End: 2024-12-02

## 2024-12-02 NOTE — TELEPHONE ENCOUNTER
Caller: Patient    Doctor: Dr. Avila    Reason for call: Patient having surgery on 12/6/24 with Dr. Avila and he is wondering how soon he should stop taking Xarelto?   He can be reached at the number below.     Call back#: [phone]

## 2024-12-05 ENCOUNTER — TELEPHONE (OUTPATIENT)
Age: 84
End: 2024-12-05

## 2024-12-05 NOTE — TELEPHONE ENCOUNTER
Caller: Ayad    Doctor: Margarita    Reason for call: Patient needs to CX SX for tomorrow please call to reschedule  Thank you    Call back#: [phone]

## 2024-12-20 NOTE — PROGRESS NOTES
"Name: Ayad Brambila      : 1940      MRN: [id number]  Encounter Provider: Rajani Jackson PA-C  Encounter Date: 2024   Encounter department: [facility]  :  Assessment & Plan  AKIRA (stress urinary incontinence), male  Noncompliant bladder neck and has significant intrinsic sphincter deficiency/stress urinary incontinence   Underwent male sling in    Underwent BNC dilation and SPT in    Recurrent urinary retention since 2018    Managed with a Cunningham clamp and a protective undergarment  Discussed AUS - not interested   External urethra clamp    [place] external catheter in place draining clear yellow urine   [facility] 35 [place] catheter -- 2 leg bag 500 ml and the 2 overnight bag 2000 ml.   Continue to monitor     Orders:    Ambulatory Referral to Urology    Prostate cancer ([facility])  Hx of prostate cancer diagnosed in   S/p RALP and external beam radiation + ADT (3 sessions)   Detectable but stable and low PSA since    PSA trend:   2024--0.11  2023--0.06  2022--0.08  2021--0.06  2020--0.06  2020--0.08  2019--0.06  ADT discussed vs repeat referral to radiation team due to biochemical recurrence of prostate cancer -- he wishes to repeat PSA at this time   Next steps will be determined based on repeat PSA value     Orders:    Ambulatory Referral to Urology    PSA Total, Diagnostic; Future    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 male with history of prostate cancer, bladder neck-contracture, and urinary incontinence, who presents to the office to establish care for ongoing monitoring.  He was originally diagnosed with prostate cancer in .  He is s/p radical prostatectomy, external beam radiation and ADT (3 sessions) " which he completed in 2005.  He underwent a male sling procedure in 2009, followed by BNC dilation and SPT placement in 2013.  He unfortunately continued to deal with recurrent urinary retention and has been utilizing an external [place] catheter since 2018 as well as a penile clamp during the day.  Refill for external catheters will be sent to [facility].  He reports that the external catheter has worked the best to decrease urinary leakage.  Discussed AUS in the past, not interested. He has not had a UTI in almost 6 years.    When it comes to his PSA, it remained undetectable until 2014. Since then it has been detectable, but remained overall low and stable. Most recent PSA from 8/12/24 was 0.11, highest PSA post-treatment.  ADT discussed versus repeat referral to radiation oncology team to discuss their recommendations due to biochemical recurrence of his prostate cancer.  At this time, he wishes to repeat his PSA and see if it continues to rise or remains stable.      Review of Systems   Constitutional:  Negative for activity change, chills, fatigue and fever.   Respiratory:  Negative for apnea, cough and shortness of breath.    Cardiovascular:  Negative for chest pain and leg swelling.   Gastrointestinal:  Positive for diarrhea. Negative for abdominal distention, abdominal pain, constipation, nausea and vomiting.   Genitourinary:  Positive for difficulty urinating (external catheter in place). Negative for decreased urine volume, dysuria, flank pain, frequency, hematuria, penile pain, testicular pain and urgency.        Incontinence    Neurological:  Negative for dizziness and headaches.   Psychiatric/Behavioral: Negative.       Medical History Reviewed by provider this encounter:  Tobacco  Allergies  Meds  Problems  Med Hx  Surg Hx  Fam Hx     .  Current Outpatient Medications on File Prior to Visit   Medication Sig Dispense Refill    lisinopril (ZESTRIL) 5 mg tablet TAKE ONE BY MOUTH DAILY 90  "tablet 1    Xarelto 20 MG tablet TAKE ONE TABLET (20 MG TOTAL) BY MOUTH DAILY WITH BREAKFAST 90 tablet 1    fluticasone (FLONASE) 50 mcg/act nasal spray 2 sprays into each nostril daily (Patient not taking: Reported on 9/3/2024) 9.9 mL 0    NON FORMULARY Take 750 mg by mouth daily as needed [place] freeze dried green lip muscle. (Patient not taking: Reported on 10/29/2024)       No current facility-administered medications on file prior to visit.      Social History     Tobacco Use    Smoking status: Former     Current packs/day: 0.00     Average packs/day: 1.5 packs/day for 12.0 years (18.0 ttl pk-yrs)     Types: Cigarettes     Start date:      Quit date:      Years since quittin.0    Smokeless tobacco: Never   Vaping Use    Vaping status: Never Used   Substance and Sexual Activity    Alcohol use: No    Drug use: No    Sexual activity: Not on file        Objective   /82 (BP Location: Left arm, Patient Position: Sitting, Cuff Size: Adult)   Pulse 62   Ht 5' 8\" (1.727 m)   Wt 121 kg (266 lb 12.8 oz)   SpO2 98%   BMI 40.57 kg/m²     Physical Exam  Vitals and nursing note reviewed.   Constitutional:       General: He is not in acute distress.     Appearance: Normal appearance. He is well-developed. He is not ill-appearing.   HENT:      Head: Normocephalic and atraumatic.   Eyes:      Conjunctiva/sclera: Conjunctivae normal.   Cardiovascular:      Rate and Rhythm: Normal rate and regular rhythm.      Heart sounds: No murmur heard.  Pulmonary:      Effort: Pulmonary effort is normal. No respiratory distress.      Breath sounds: Normal breath sounds.   Abdominal:      General: Abdomen is flat. There is no distension.      Palpations: Abdomen is soft.      Tenderness: There is no abdominal tenderness.   Genitourinary:     Comments: External urinary catheter in place draining clear yellow urine   Musculoskeletal:         General: No swelling.      Cervical back: Neck supple.   Skin:     General: " "Skin is warm and dry.      Capillary Refill: Capillary refill takes less than 2 seconds.   Neurological:      Mental Status: He is alert.   Psychiatric:         Mood and Affect: Mood normal.          Results  No results found for: \"PSA\"  Lab Results   Component Value Date    GLUCOSE 95 11/25/2015    CALCIUM 8.9 08/28/2024     12/01/2016    K 4.6 08/28/2024    CO2 26 08/28/2024     08/28/2024    BUN 18 08/28/2024    CREATININE 0.98 08/28/2024     Lab Results   Component Value Date    WBC 4.69 08/28/2024    HGB 14.4 08/28/2024    HCT 42.7 08/28/2024    MCV 95 08/28/2024     08/28/2024       Office Urine Dip  No results found for this or any previous visit (from the past hour).]      "

## 2024-12-23 ENCOUNTER — RESULTS FOLLOW-UP (OUTPATIENT)
Dept: UROLOGY | Facility: HOSPITAL | Age: 84
End: 2024-12-23

## 2024-12-23 ENCOUNTER — APPOINTMENT (OUTPATIENT)
Dept: LAB | Facility: HOSPITAL | Age: 84
End: 2024-12-23
Payer: COMMERCIAL

## 2024-12-23 ENCOUNTER — TELEPHONE (OUTPATIENT)
Dept: UROLOGY | Facility: HOSPITAL | Age: 84
End: 2024-12-23

## 2024-12-23 ENCOUNTER — CONSULT (OUTPATIENT)
Dept: UROLOGY | Facility: HOSPITAL | Age: 84
End: 2024-12-23
Payer: COMMERCIAL

## 2024-12-23 VITALS
WEIGHT: 266.8 LBS | SYSTOLIC BLOOD PRESSURE: 146 MMHG | BODY MASS INDEX: 40.44 KG/M2 | HEART RATE: 62 BPM | OXYGEN SATURATION: 98 % | DIASTOLIC BLOOD PRESSURE: 82 MMHG | HEIGHT: 68 IN

## 2024-12-23 DIAGNOSIS — N39.3 SUI (STRESS URINARY INCONTINENCE), MALE: ICD-10-CM

## 2024-12-23 DIAGNOSIS — C61 PROSTATE CANCER (HCC): ICD-10-CM

## 2024-12-23 DIAGNOSIS — E08.49 DIABETES DUE TO UNDRL CONDITION W OTH DIABETIC NEURO COMP (HCC): Primary | ICD-10-CM

## 2024-12-23 LAB — PSA SERPL-MCNC: 0.11 NG/ML (ref 0–4)

## 2024-12-23 PROCEDURE — 99203 OFFICE O/P NEW LOW 30 MIN: CPT

## 2024-12-23 PROCEDURE — 36415 COLL VENOUS BLD VENIPUNCTURE: CPT

## 2024-12-23 PROCEDURE — 84153 ASSAY OF PSA TOTAL: CPT

## 2024-12-23 NOTE — ASSESSMENT & PLAN NOTE
Hx of prostate cancer diagnosed in 2001  S/p RALP and external beam radiation + ADT (3 sessions)   Detectable but stable and low PSA since 2014   PSA trend:   8/12/2024--0.11  8/28/2023--0.06  8/2/2022--0.08  7/26/2021--0.06  7/16/2020--0.06  1/20/2020--0.08  4/13/2019--0.06  ADT discussed vs repeat referral to radiation team due to biochemical recurrence of prostate cancer -- he wishes to repeat PSA at this time   Next steps will be determined based on repeat PSA value     Orders:    Ambulatory Referral to Urology    PSA Total, Diagnostic; Future

## 2024-12-23 NOTE — TELEPHONE ENCOUNTER
Repeat PSA has not changed over the last 6 months and returned at 0.107 (12/23/24).  Previous PSA from August 2024 was 0.11.      As discussed in office, we can 1. continue monitoring the patient's PSA on a 6-month basis to see if it continues to rise, or we can 2. refer him to radiation oncology to hear their recommendations moving forward as he has had biochemical recurrence of his prostate cancer since 2014.  Please reach out to the patient and see what he prefers to do moving forward.

## 2024-12-23 NOTE — ASSESSMENT & PLAN NOTE
Noncompliant bladder neck and has significant intrinsic sphincter deficiency/stress urinary incontinence   Underwent male sling in 2009   Underwent BNC dilation and SPT in 2013   Recurrent urinary retention since 2018    Managed with a Cunningham clamp and a protective undergarment  Discussed AUS - not interested   External urethra clamp    [place] external catheter in place draining clear yellow urine   [facility] [facility] - [facility] 1 month supply 35 [place] catheter -- 2 leg bag 500 ml and the 2 overnight bag 2000 ml.   Continue to monitor     Orders:    Ambulatory Referral to Urology

## 2024-12-23 NOTE — TELEPHONE ENCOUNTER
Called Tutu back and his daughter orders from [place] - and would need signature from Rajani - he will call back tomorrow and notify us tomorrow

## 2024-12-23 NOTE — TELEPHONE ENCOUNTER
Patient transitioning care, uses external texas catheter, unsure on size. [employer] company - Ryan 1 month supply 35 texas catheter -- 2 leg bag 500 ml and the 2 overnight bag 2000 ml.     Patient states his daughter usually assists with orders, reach out to her for sizing details so we can assist with catheter refills. Thank you!

## 2024-12-24 DIAGNOSIS — I10 ESSENTIAL HYPERTENSION: ICD-10-CM

## 2024-12-24 RX ORDER — LISINOPRIL 5 MG/1
TABLET ORAL
Qty: 90 TABLET | Refills: 1 | Status: SHIPPED | OUTPATIENT
Start: 2024-12-24

## 2024-12-26 DIAGNOSIS — C61 PROSTATE CANCER (HCC): Primary | ICD-10-CM

## 2024-12-26 DIAGNOSIS — S83.241A ACUTE MEDIAL MENISCUS TEAR OF RIGHT KNEE, INITIAL ENCOUNTER: ICD-10-CM

## 2024-12-26 NOTE — TELEPHONE ENCOUNTER
Spoke to patient. He is wanting to re test psa level in 6 months. Depending on those results will decide from there what he would like to do.

## 2024-12-26 NOTE — TELEPHONE ENCOUNTER
Sabrina from [employer] called today to ask if the order form was received for the condom catheter supplies. It was faxed earlier this AM. I was able to confirm that yes, the form was received, completed and signed and faxed back. Order is scanned under Media.    No further action needed at this time.

## 2025-03-26 ENCOUNTER — OFFICE VISIT (OUTPATIENT)
Dept: FAMILY MEDICINE CLINIC | Facility: HOSPITAL | Age: 85
End: 2025-03-26
Payer: COMMERCIAL

## 2025-03-26 VITALS
HEART RATE: 61 BPM | OXYGEN SATURATION: 97 % | WEIGHT: 268 LBS | TEMPERATURE: 98.2 F | BODY MASS INDEX: 40.62 KG/M2 | DIASTOLIC BLOOD PRESSURE: 70 MMHG | HEIGHT: 68 IN | SYSTOLIC BLOOD PRESSURE: 122 MMHG

## 2025-03-26 DIAGNOSIS — E66.01 OBESITY, MORBID (HCC): ICD-10-CM

## 2025-03-26 DIAGNOSIS — Z13.1 SCREENING FOR DIABETES MELLITUS (DM): ICD-10-CM

## 2025-03-26 DIAGNOSIS — C61 PROSTATE CANCER (HCC): ICD-10-CM

## 2025-03-26 DIAGNOSIS — I82.4Y1 DEEP VEIN THROMBOSIS (DVT) OF PROXIMAL VEIN OF RIGHT LOWER EXTREMITY, UNSPECIFIED CHRONICITY (HCC): ICD-10-CM

## 2025-03-26 DIAGNOSIS — G56.03 BILATERAL CARPAL TUNNEL SYNDROME: ICD-10-CM

## 2025-03-26 DIAGNOSIS — I10 ESSENTIAL HYPERTENSION: Primary | ICD-10-CM

## 2025-03-26 PROCEDURE — 99214 OFFICE O/P EST MOD 30 MIN: CPT | Performed by: FAMILY MEDICINE

## 2025-03-26 NOTE — ASSESSMENT & PLAN NOTE
Would like to have a 2nd opinion regarding cts surgery.   Referral to Dr. Evans.   Orders:    Ambulatory Referral to Orthopedic Surgery; Future

## 2025-03-26 NOTE — PROGRESS NOTES
Name: Ayad Brambila      : 1940      MRN: [id number]  Encounter Provider: Narinder Renteria MD  Encounter Date: 3/26/2025   Encounter department: [facility] CARE [address]   :  Assessment & Plan  Prostate cancer (HCC)  Fu with Urology.   Had slight bump in psa.   Upcoming repeat psa.   May need referral back to radiation oncology       Obesity, morbid (HCC)      Work on dietary control and exercise       Bilateral carpal tunnel syndrome  Would like to have a 2nd opinion regarding cts surgery.   Referral to Dr. Evans.   Orders:    Ambulatory Referral to Orthopedic Surgery; Future    Essential hypertension  Stable. Continue with the same.   Orders:    CBC; Future    Comprehensive metabolic panel; Future    Lipid Panel with Direct LDL reflex; Future    Screening for diabetes mellitus (DM)    No diagnosis of diabetes.   Check A1c and fbs.       Orders:    Hemoglobin A1C; Future    Deep vein thrombosis (DVT) of proximal vein of right lower extremity, unspecified chronicity (HCC)  Coninues with AC.   No active bleeding.               History of Present Illness   Here for fu of chronic conditions.   With known Cts. Did not go through surgery due to type of surgery Ortho was proposing.   He would like a different opinion with a possible different aproach.     Has been seeing urology regarding Prostate ca.   Had a slight elevation of psa. Was stable at 0.6 but went up to 0.11 last time.   Was advised to see raidation oncology back.   Has fu with Urology.       Review of Systems   Constitutional: Negative.  Negative for activity change, appetite change, chills and diaphoresis.   HENT:  Negative for congestion and dental problem.    Respiratory: Negative.  Negative for apnea, chest tightness, shortness of breath and wheezing.    Cardiovascular: Negative.  Negative for chest pain, palpitations and leg swelling.   Gastrointestinal: Negative.  Negative for abdominal distention, abdominal pain,  "constipation, diarrhea and nausea.   Genitourinary: Negative.  Negative for difficulty urinating, dysuria and frequency.       Objective   /70   Pulse 61   Temp 98.2 °F (36.8 °C)   Ht 5' 8\" (1.727 m)   Wt 122 kg (268 lb)   SpO2 97%   BMI 40.75 kg/m²      Physical Exam  Vitals reviewed.   Constitutional:       General: He is not in acute distress.     Appearance: He is well-developed. He is not ill-appearing.   HENT:      Head: Normocephalic and atraumatic.      Right Ear: External ear normal.      Left Ear: External ear normal.      Mouth/Throat:      Mouth: Mucous membranes are moist.      Pharynx: Oropharynx is clear.   Eyes:      Extraocular Movements: Extraocular movements intact.      Conjunctiva/sclera: Conjunctivae normal.      Pupils: Pupils are equal, round, and reactive to light.   Cardiovascular:      Rate and Rhythm: Normal rate and regular rhythm.      Heart sounds: Normal heart sounds. No murmur heard.  Pulmonary:      Effort: Pulmonary effort is normal.      Breath sounds: Normal breath sounds.   Abdominal:      General: Bowel sounds are normal. There is no distension.      Palpations: Abdomen is soft. There is no mass.      Tenderness: There is no abdominal tenderness. There is no guarding.      Hernia: No hernia is present.   Musculoskeletal:         General: Normal range of motion.      Cervical back: Normal range of motion and neck supple.   Skin:     General: Skin is warm and dry.      Capillary Refill: Capillary refill takes less than 2 seconds.   Neurological:      General: No focal deficit present.      Mental Status: He is alert and oriented to person, place, and time.   Psychiatric:         Mood and Affect: Mood normal.         Behavior: Behavior normal.         "

## 2025-03-26 NOTE — ASSESSMENT & PLAN NOTE
Stable. Continue with the same.   Orders:    CBC; Future    Comprehensive metabolic panel; Future    Lipid Panel with Direct LDL reflex; Future

## 2025-03-26 NOTE — ASSESSMENT & PLAN NOTE
Fu with Urology.   Had slight bump in psa.   Upcoming repeat psa.   May need referral back to radiation oncology

## 2025-04-03 ENCOUNTER — APPOINTMENT (OUTPATIENT)
Dept: LAB | Facility: HOSPITAL | Age: 85
End: 2025-04-03
Payer: COMMERCIAL

## 2025-04-03 DIAGNOSIS — Z13.1 SCREENING FOR DIABETES MELLITUS (DM): ICD-10-CM

## 2025-04-03 DIAGNOSIS — C61 PROSTATE CANCER (HCC): ICD-10-CM

## 2025-04-03 DIAGNOSIS — I10 ESSENTIAL HYPERTENSION: ICD-10-CM

## 2025-04-03 LAB
ALBUMIN SERPL BCG-MCNC: 4.1 G/DL (ref 3.5–5)
ALP SERPL-CCNC: 60 U/L (ref 34–104)
ALT SERPL W P-5'-P-CCNC: 21 U/L (ref 7–52)
ANION GAP SERPL CALCULATED.3IONS-SCNC: 6 MMOL/L (ref 4–13)
AST SERPL W P-5'-P-CCNC: 19 U/L (ref 13–39)
BILIRUB SERPL-MCNC: 0.83 MG/DL (ref 0.2–1)
BUN SERPL-MCNC: 16 MG/DL (ref 5–25)
CALCIUM SERPL-MCNC: 9 MG/DL (ref 8.4–10.2)
CHLORIDE SERPL-SCNC: 104 MMOL/L (ref 96–108)
CHOLEST SERPL-MCNC: 180 MG/DL (ref ?–200)
CO2 SERPL-SCNC: 28 MMOL/L (ref 21–32)
CREAT SERPL-MCNC: 0.98 MG/DL (ref 0.6–1.3)
ERYTHROCYTE [DISTWIDTH] IN BLOOD BY AUTOMATED COUNT: 12.7 % (ref 11.6–15.1)
EST. AVERAGE GLUCOSE BLD GHB EST-MCNC: 120 MG/DL
GFR SERPL CREATININE-BSD FRML MDRD: 71 ML/MIN/1.73SQ M
GLUCOSE P FAST SERPL-MCNC: 99 MG/DL (ref 65–99)
HBA1C MFR BLD: 5.8 %
HCT VFR BLD AUTO: 45.6 % (ref 36.5–49.3)
HDLC SERPL-MCNC: 40 MG/DL
HGB BLD-MCNC: 15.3 G/DL (ref 12–17)
LDLC SERPL CALC-MCNC: 117 MG/DL (ref 0–100)
MCH RBC QN AUTO: 32.5 PG (ref 26.8–34.3)
MCHC RBC AUTO-ENTMCNC: 33.6 G/DL (ref 31.4–37.4)
MCV RBC AUTO: 97 FL (ref 82–98)
PLATELET # BLD AUTO: 180 THOUSANDS/UL (ref 149–390)
PMV BLD AUTO: 10.3 FL (ref 8.9–12.7)
POTASSIUM SERPL-SCNC: 4.6 MMOL/L (ref 3.5–5.3)
PROT SERPL-MCNC: 6.5 G/DL (ref 6.4–8.4)
PSA SERPL-MCNC: 0.11 NG/ML (ref 0–4)
RBC # BLD AUTO: 4.71 MILLION/UL (ref 3.88–5.62)
SODIUM SERPL-SCNC: 138 MMOL/L (ref 135–147)
TRIGL SERPL-MCNC: 114 MG/DL (ref ?–150)
WBC # BLD AUTO: 4.55 THOUSAND/UL (ref 4.31–10.16)

## 2025-04-03 PROCEDURE — 80061 LIPID PANEL: CPT

## 2025-04-03 PROCEDURE — 84153 ASSAY OF PSA TOTAL: CPT

## 2025-04-03 PROCEDURE — 36415 COLL VENOUS BLD VENIPUNCTURE: CPT

## 2025-04-03 PROCEDURE — 85027 COMPLETE CBC AUTOMATED: CPT

## 2025-04-03 PROCEDURE — 83036 HEMOGLOBIN GLYCOSYLATED A1C: CPT

## 2025-04-03 PROCEDURE — 80053 COMPREHEN METABOLIC PANEL: CPT

## 2025-04-04 ENCOUNTER — RESULTS FOLLOW-UP (OUTPATIENT)
Dept: OTHER | Facility: HOSPITAL | Age: 85
End: 2025-04-04

## 2025-04-04 DIAGNOSIS — C61 PROSTATE CANCER (HCC): Primary | ICD-10-CM

## 2025-04-04 NOTE — TELEPHONE ENCOUNTER
Left message for patient regarding psa lab work and obtaining one in three months. Phone number was left in case of any questions.

## 2025-04-04 NOTE — TELEPHONE ENCOUNTER
Called and left a VM for this pt relaying this message. I also left the number to urology if this pt has any questions. If this pt calls back please relay these messages again. Thank you          ----- Message from Rajani Jackson PA-C sent at 4/4/2025  2:01 PM EDT -----  Patient's PSA remains overall stable at 0.106.  Recommend repeat PSA in 3 months; order placed.  If the patient's PSA would rise greater than 0.2 he should reconsider repeat ADT and will require further imaging.

## 2025-04-04 NOTE — RESULT ENCOUNTER NOTE
Patient's PSA remains overall stable at 0.106.  Recommend repeat PSA in 3 months; order placed.  If the patient's PSA would rise greater than 0.2 he should reconsider repeat ADT and will require further imaging.

## 2025-04-04 NOTE — TELEPHONE ENCOUNTER
----- Message from Rajani Jackson PA-C sent at 4/4/2025  2:01 PM EDT -----  Patient's PSA remains overall stable at 0.106.  Recommend repeat PSA in 3 months; order placed.  If the patient's PSA would rise greater than 0.2 he should reconsider repeat ADT and will require further imaging.

## 2025-05-07 DIAGNOSIS — I82.4Y1 ACUTE DEEP VEIN THROMBOSIS (DVT) OF PROXIMAL VEIN OF RIGHT LOWER EXTREMITY (HCC): ICD-10-CM

## 2025-05-07 NOTE — TELEPHONE ENCOUNTER
Patient called to request a refill for their Xarelto advised a refill was requested on 5/7/25 and is pending approval. Patient verbalized understanding and is in agreement.     Does the patient have enough for 3 days?   [x] Yes   [] No - Send as HP to POD

## 2025-05-08 RX ORDER — RIVAROXABAN 20 MG/1
TABLET, FILM COATED ORAL
Qty: 90 TABLET | Refills: 1 | Status: SHIPPED | OUTPATIENT
Start: 2025-05-08

## 2025-05-09 ENCOUNTER — TELEPHONE (OUTPATIENT)
Age: 85
End: 2025-05-09

## 2025-05-09 NOTE — TELEPHONE ENCOUNTER
Caller: Ayad Brambila    Doctor and/or Office: Dr. Crowell/Tyrese    #: [phone]    Escalation: Appointment Patient calling in to see if we had any cancellations. He stepped on a wire brush and there is a piece of it still stuck in his foot causing him a lot of pain. Please return call and advise. Thank you

## 2025-05-13 ENCOUNTER — OFFICE VISIT (OUTPATIENT)
Dept: PODIATRY | Facility: CLINIC | Age: 85
End: 2025-05-13
Payer: COMMERCIAL

## 2025-05-13 VITALS — HEIGHT: 68 IN | WEIGHT: 264 LBS | BODY MASS INDEX: 40.01 KG/M2

## 2025-05-13 DIAGNOSIS — S90.851A FOREIGN BODY IN RIGHT FOOT WITH INFECTION, INITIAL ENCOUNTER: Primary | ICD-10-CM

## 2025-05-13 DIAGNOSIS — L08.9 FOREIGN BODY IN RIGHT FOOT WITH INFECTION, INITIAL ENCOUNTER: Primary | ICD-10-CM

## 2025-05-13 DIAGNOSIS — Z79.01 CHRONIC ANTICOAGULATION: ICD-10-CM

## 2025-05-13 DIAGNOSIS — I87.2 CHRONIC VENOUS INSUFFICIENCY: ICD-10-CM

## 2025-05-13 PROCEDURE — 99202 OFFICE O/P NEW SF 15 MIN: CPT | Performed by: PODIATRIST

## 2025-05-13 PROCEDURE — 28190 REMOVAL OF FOOT FOREIGN BODY: CPT | Performed by: PODIATRIST

## 2025-05-19 ENCOUNTER — OFFICE VISIT (OUTPATIENT)
Dept: OBGYN CLINIC | Facility: CLINIC | Age: 85
End: 2025-05-19
Payer: COMMERCIAL

## 2025-05-19 ENCOUNTER — PREP FOR PROCEDURE (OUTPATIENT)
Dept: OBGYN CLINIC | Facility: CLINIC | Age: 85
End: 2025-05-19

## 2025-05-19 VITALS — BODY MASS INDEX: 40.01 KG/M2 | HEIGHT: 68 IN | WEIGHT: 264 LBS

## 2025-05-19 DIAGNOSIS — G56.01 CARPAL TUNNEL SYNDROME ON RIGHT: Primary | ICD-10-CM

## 2025-05-19 PROCEDURE — 99204 OFFICE O/P NEW MOD 45 MIN: CPT | Performed by: ORTHOPAEDIC SURGERY

## 2025-05-19 RX ORDER — SODIUM CHLORIDE, SODIUM LACTATE, POTASSIUM CHLORIDE, CALCIUM CHLORIDE 600; 310; 30; 20 MG/100ML; MG/100ML; MG/100ML; MG/100ML
20 INJECTION, SOLUTION INTRAVENOUS CONTINUOUS
OUTPATIENT
Start: 2025-05-19

## 2025-05-19 RX ORDER — LIDOCAINE HYDROCHLORIDE AND EPINEPHRINE 10; 10 MG/ML; UG/ML
20 INJECTION, SOLUTION INFILTRATION; PERINEURAL ONCE
OUTPATIENT
Start: 2025-05-19 | End: 2025-05-19

## 2025-05-19 NOTE — ASSESSMENT & PLAN NOTE
Patient experiencing right carpal tunnel syndrome.  Treatment options were discussed with patient.  After discussion, patient would like to proceed with right endoscopic carpal tunnel release under local.  Risks and benefits were discussed with patient at today's visit.  Informed consent was reviewed and signed today.  Patient will follow-up for postop appointment.  Orders:  •  Case request operating room: Right endoscopic carpal tunnel release; Standing

## 2025-05-19 NOTE — PROGRESS NOTES
ORTHOPAEDIC HAND, WRIST, AND ELBOW OFFICE  VISIT     Name: Ayad Brambila      : 1940      MRN: [id number]  Encounter Provider: Philip Evans MD  Encounter Date: 2025   [facility]  :  Assessment & Plan  Carpal tunnel syndrome on right  Patient experiencing right carpal tunnel syndrome.  Treatment options were discussed with patient.  After discussion, patient would like to proceed with right endoscopic carpal tunnel release under local.  Risks and benefits were discussed with patient at today's visit.  Informed consent was reviewed and signed today.  Patient will follow-up for postop appointment.  Orders:  •  Case request operating room: Right endoscopic carpal tunnel release; Standing        General Discussions:     Carpal Tunnel Syndrome: The anatomy and physiology of carpal tunnel syndrome was discussed with the patient today.  Increase pressure localized under the transverse carpal ligament can cause pain, numbness, tingling, or dysesthesias within the median nerve distribution as well as feelings of fatigue, clumsiness, or awkwardness.  These symptoms typically occur at night and worse in the morning upon waking.  Eventually, untreated carpal tunnel syndrome can result in weakness and permanent loss of muscle within the thenar compartment of the hand.  Treatment options were discussed with the patient.  Conservative treatment includes nocturnal resting splints to keep the nerve in a neutral position, ergonomic changes within the work or home environment, activity modification, and tendon gliding exercises.  Steroid injections within the carpal canal can help a majority of patients, however this is often self-limited in a most patients.  Surgical intervention to divide the transverse carpal ligament typically results in a long-lasting relief of the patient's complaints, with the recurrence rate of less than 5%.     Operative  Discussions:     Endoscopic Carpal Tunnel Release:   The anatomy and physiology of carpal tunnel syndrome was discussed with the patient today.  Increase pressure localized under the transverse carpal ligament can cause pain, numbness, tingling, or dysesthesias within the median nerve distribution as well as feelings of fatigue, clumsiness, or awkwardness.  These symptoms typically occur at night and worse in the morning upon waking.  Eventually, untreated carpal tunnel syndrome can result in weakness and permanent loss of muscle within the thenar compartment of the hand.  Treatment options were discussed with the patient.  Conservative treatment includes nocturnal resting splints to keep the nerve in a neutral position, ergonomic changes within the work or home environment, activity modification, and tendon gliding exercises.  Steroid injections within the carpal canal can help a majority of patients, however this is often self-limited in a majority of patients.  Surgical intervention to divide the transverse carpal ligament typically results in a long-lasting relief of the patient's complaints, with the recurrence rate of less than 5%. The patient has elected to undergo an endoscopic carpal tunnel release.  The 2 incision technique was discussed with the patient, which results in approximately a two-week less recovery time, and less wound complications.  In the postoperative period, light activities are allowed immediately, driving is allowed when narcotic medication has stopped, and the bandages may be removed and incision may get wet after 5 days.  Heavy activities (lifting more than approximately 5 pounds) will be allowed after follow up appointment in 1-2 weeks.  While the pain and discomfort in the hands generally improves rapidly, the numbness and tingling as well as the strength will slowly improve over weeks to months depending on the severity of the carpal tunnel syndrome.  Pillar pain was discussed with  the patient, which is typically a common but self-limiting condition.  The risks of bleeding and infection from the surgery are less than 1%.  Risk of recurrence is less than 5%.  The risks of nerve injury or nerve damage or damage to the blood vessels is approximately 1 in 1000. The patient has an understanding of the above mentioned discussion.The risks and benefits of the procedure were explained to the patient, which include, but are not limited to: Bleeding, infection, recurrence, pain, scar, damage to tendons, damage to nerves, and damage to blood vessels, failure to give desired results and complications related to anesthesia.  These risks, along with alternative conservative treatment options, and postoperative protocols were voiced back and understood by the patient.  All questions were answered to the patient's satisfaction.  The patient agrees to comply with a standard postoperative protocol, and is willing to proceed.  Education was provided via written and auditory forms.  There were no barriers to learning. Written handouts regarding wound care, incision and scar care, and general preoperative information was provided to the patient.  Prior to surgery, the patient may be requested to stop all anti-inflammatory medications.  Prophylactic aspirin, Plavix, and Coumadin may be allowed to be continued.  Medications including vitamin E., ginkgo, and fish oil are requested to be stopped approximately one week prior to surgery.  Hypertensive medications and beta blockers, if taken, should be continued.    History of Present Illness   HPI  Chief Complaint   Patient presents with   • Right Wrist - New Patient Visit     US - 10/23/24       Ayad Brambila is a 85 y.o. male who presents for right wrist numbness and tingling. It has been going on for many months. Pushing, pulling, grabbing and twisting increases symptoms. He states that he had it done left carpal tunnel release with Dr. Childers in 2021 with good  "relief.  Patient states he did not have a good experience with anesthesiologist when he had that surgery and would like to avoid treatments with the anesthesiologist which is why he is here today for evaluation.  Patient states he was scheduled to have surgery with Dr. Avila however would like to have a surgeon who does it endoscopically.  Patient states he has had ultrasounds done to evaluate for carpal tunnel syndrome.      REVIEW OF SYSTEMS:  General: no fever, no chills  HEENT:  No loss of hearing or eyesight problems  Eyes:  No red eyes  Respiratory:  No coughing, shortness of breath or wheezing  Cardiovascular:  No chest pain, no palpitations  GI:  Abdomen soft nontender, denies nausea  Endocrine:  No muscle weakness, no frequent urination, no excessive thirst  Urinary:  No dysuria, no incontinence  Musculoskeletal: see HPI and PE  SKIN:  No skin rash, no dry skin  Neurological:  No headaches, no confusion  Psychiatric:  No suicide thoughts, no anxiety, no depression  Review of all other systems is negative    Objective   Ht 5' 8\" (1.727 m)   Wt 120 kg (264 lb)   BMI 40.14 kg/m²      General: well developed and well nourished, alert, oriented times 3, and appears comfortable  Psychiatric: Normal  HEENT: Trachea Midline, No torticollis  Cardiovascular: No discernable arrhythmia  Pulmonary: No wheezing or stridor  Abdomen: No rebound or guarding  Extremities: No peripheral edema  Skin: No masses, erythema, lacerations, fluctation, ulcerations  Neurovascular: Sensation Intact to the Median, Ulnar, Radial Nerve, Motor Intact to the Median, Ulnar, Radial Nerve, and Pulses Intact    Musculoskeletal exam:    Sensation was not decreased in the median nerve distribution.  Sensation was not decreased in the ulnar nerve distribution. There was not APB atrophy.  There was no intrinsic atrophy. Tinel's at the wrist was Positive.Wrist flexion compression was positive. 5/5 AIN. 4/5 APB    STUDIES REVIEWED:  Pathology " reviewed:  MSK US demonstrates right carpal tunnel syndrome.      PROCEDURES PERFORMED:  Procedures  No Procedures performed today  Scribe Attestation    I,:  Garett Galvan am acting as a scribe while in the presence of the attending physician.:       I,:  Philip Evans MD personally performed the services described in this documentation    as scribed in my presence.:

## 2025-07-07 ENCOUNTER — APPOINTMENT (OUTPATIENT)
Dept: LAB | Facility: HOSPITAL | Age: 85
End: 2025-07-07
Payer: COMMERCIAL

## 2025-07-07 DIAGNOSIS — C61 PROSTATE CANCER (HCC): ICD-10-CM

## 2025-07-07 LAB — PSA SERPL-MCNC: 0.14 NG/ML (ref 0–4)

## 2025-07-07 PROCEDURE — 84153 ASSAY OF PSA TOTAL: CPT

## 2025-07-07 PROCEDURE — 36415 COLL VENOUS BLD VENIPUNCTURE: CPT

## 2025-07-08 ENCOUNTER — PATIENT OUTREACH (OUTPATIENT)
Dept: HEMATOLOGY ONCOLOGY | Facility: CLINIC | Age: 85
End: 2025-07-08

## 2025-07-08 ENCOUNTER — DOCUMENTATION (OUTPATIENT)
Dept: HEMATOLOGY ONCOLOGY | Facility: CLINIC | Age: 85
End: 2025-07-08

## 2025-07-08 ENCOUNTER — RESULTS FOLLOW-UP (OUTPATIENT)
Dept: OTHER | Facility: HOSPITAL | Age: 85
End: 2025-07-08

## 2025-07-08 DIAGNOSIS — C61 PROSTATE CANCER (HCC): Primary | ICD-10-CM

## 2025-07-08 NOTE — TELEPHONE ENCOUNTER
Updated PSA from 7/7/2025 continues to rise now at 0.14.  As the patient previously underwent radical prostatectomy and his PSA continues to slowly rise, referral will be placed to radiation oncology team for further discussion to see if they recommend ADT versus further treatments.

## 2025-07-08 NOTE — PROGRESS NOTES
Oncology Nurse Navigator outreach attempted in response to referral received to radiation oncology. I left VM explaining my role and reason for my call. I included my direct number, [phone], and requested a return call.

## 2025-07-08 NOTE — PROGRESS NOTES
PN please retrieve outside records and Radiation Records needed-Please forward to [facility] office once scheduled     Referral received/ Chart reviewed for work up completed   Referral to: Radiation   Dx: Prostate Cancer   Urologist: Rajani Jackson PA-C    Imaging completed: [][facility] [x]Other: [facility]   [] PET/CT   [] MRI    [] CT   [] US   [] Mammo   [x] Bone scan 4/23/2021   [] N/A    Pathology completed:    Location: [facility]   Date: 2001- historic unable to obtain tissue- possibly report?    Additional records needed:   [] Genomic report   [] Genetic testing results   [] Office Note   [] Procedure/ Operative note   [] Lab results   [x] N/A    [x] Radiation Oncology records retrieval needed (PN to route to rad/onc clerical pool once scheduled)  Date: 2005   Location: [facility]    Lab Results   Component Value Date    PSA 0.140 07/07/2025    PSA 0.106 04/03/2025    PSA 0.107 12/23/2024

## 2025-07-08 NOTE — PROGRESS NOTES
Oncology Nurse Navigator received return call and spoke with Ayad about my role as an Oncology Nurse Navigator in providing clinical support based on evidence based outcomes, advocacy and assistance with navigating the health care system as we prepare radiation to establish with the Oncology team.     We discussed what to expect during upcoming radiation consult. Questions were answered regarding current PSA level. Provided him with the lab values from previous.      Evaluated for any barriers to care -none at this time. He is supported by his wife and daughter who is an RN, who will attend his consult as well.   Distress thermometer to be completed by PN  Smoking status verified   Verified PCP on file   Verified patient has insurance on file.     Do you have a history of cancer? yes - prostate  Have you ever received Radiation Therapy? Yes     Where  did you receive RT? [facility]   When? 2005    I provided Ayad with my direct contact info and confirmed they have the numbers to contact their providers. He is aware that the schedulers will be calling to assist with a radiation oncology consult appnt. I provided him with the number for [facility] as well for his convenience. He expressed understanding and appreciation for the call.     Future Appointments   Date Time Provider Department Center   7/21/2025  9:15 AM Arianne Baker MD [facility]   9/24/2025  9:45 AM Narinder Renteria MD [place] [id number] [facility]

## 2025-07-09 ENCOUNTER — PATIENT OUTREACH (OUTPATIENT)
Dept: HEMATOLOGY ONCOLOGY | Facility: CLINIC | Age: 85
End: 2025-07-09

## 2025-07-09 NOTE — TELEPHONE ENCOUNTER
PRISCILLA mcdonnell Tutu that his PSA from 7/7/2025 continues to rise now at 0.14.  , referral will be placed to radiation oncology team for further discussion to see if they recommend ADT versus further treatments.

## 2025-07-09 NOTE — PROGRESS NOTES
Intake received, chart reviewed for need of external records.  Consulting: Dr. Tamayo  Scheduled on 7/23/2025  Dx: Prostate cancer    ICD: C61    Pathology report requested from 2001.   From [facility] medical records via fax [phone] (phone # [phone])      Images requested: Bone scan from 4/23/2021  From [facility] via fax [phone] (phone # [phone])  If not uploaded electronically via [facility], disks will be sent directly to the Radiology Reading Room.

## 2025-07-10 ENCOUNTER — HOSPITAL ENCOUNTER (OUTPATIENT)
Facility: HOSPITAL | Age: 85
Setting detail: OUTPATIENT SURGERY
Discharge: HOME | End: 2025-07-10
Attending: ORTHOPAEDIC SURGERY | Admitting: ORTHOPAEDIC SURGERY
Payer: COMMERCIAL

## 2025-07-10 VITALS
RESPIRATION RATE: 18 BRPM | DIASTOLIC BLOOD PRESSURE: 67 MMHG | HEART RATE: 56 BPM | OXYGEN SATURATION: 96 % | SYSTOLIC BLOOD PRESSURE: 151 MMHG | BODY MASS INDEX: 39.4 KG/M2 | TEMPERATURE: 97.2 F | WEIGHT: 260 LBS | HEIGHT: 68 IN

## 2025-07-10 DIAGNOSIS — G56.01 CARPAL TUNNEL SYNDROME ON RIGHT: Primary | ICD-10-CM

## 2025-07-10 PROCEDURE — 29848 WRIST ENDOSCOPY/SURGERY: CPT | Performed by: ORTHOPAEDIC SURGERY

## 2025-07-10 PROCEDURE — 29848 WRIST ENDOSCOPY/SURGERY: CPT | Performed by: PHYSICIAN ASSISTANT

## 2025-07-10 PROCEDURE — NC001 PR NO CHARGE: Performed by: ORTHOPAEDIC SURGERY

## 2025-07-10 RX ORDER — MAGNESIUM HYDROXIDE 1200 MG/15ML
LIQUID ORAL AS NEEDED
Status: DISCONTINUED | OUTPATIENT
Start: 2025-07-10 | End: 2025-07-10 | Stop reason: HOSPADM

## 2025-07-10 RX ORDER — TRAMADOL HYDROCHLORIDE 50 MG/1
50 TABLET ORAL EVERY 6 HOURS PRN
Qty: 5 TABLET | Refills: 0 | Status: SHIPPED | OUTPATIENT
Start: 2025-07-10

## 2025-07-10 RX ORDER — SODIUM CHLORIDE, SODIUM LACTATE, POTASSIUM CHLORIDE, CALCIUM CHLORIDE 600; 310; 30; 20 MG/100ML; MG/100ML; MG/100ML; MG/100ML
20 INJECTION, SOLUTION INTRAVENOUS CONTINUOUS
Status: DISCONTINUED | OUTPATIENT
Start: 2025-07-10 | End: 2025-07-10 | Stop reason: HOSPADM

## 2025-07-10 RX ORDER — LIDOCAINE HYDROCHLORIDE AND EPINEPHRINE 10; 10 MG/ML; UG/ML
20 INJECTION, SOLUTION INFILTRATION; PERINEURAL ONCE
Status: COMPLETED | OUTPATIENT
Start: 2025-07-10 | End: 2025-07-10

## 2025-07-10 RX ORDER — TRAMADOL HYDROCHLORIDE 50 MG/1
50 TABLET ORAL EVERY 6 HOURS PRN
Status: CANCELLED | OUTPATIENT
Start: 2025-07-10

## 2025-07-10 RX ORDER — ONDANSETRON 2 MG/ML
4 INJECTION INTRAMUSCULAR; INTRAVENOUS EVERY 6 HOURS PRN
Status: CANCELLED | OUTPATIENT
Start: 2025-07-10

## 2025-07-10 RX ORDER — ACETAMINOPHEN 325 MG/1
650 TABLET ORAL EVERY 6 HOURS PRN
Status: CANCELLED | OUTPATIENT
Start: 2025-07-10

## 2025-07-10 RX ORDER — SENNOSIDES 8.6 MG
650 CAPSULE ORAL EVERY 8 HOURS PRN
Qty: 30 TABLET | Refills: 0 | Status: SHIPPED | OUTPATIENT
Start: 2025-07-10

## 2025-07-10 RX ADMIN — LIDOCAINE HYDROCHLORIDE,EPINEPHRINE BITARTRATE 20 ML: 10; .01 INJECTION, SOLUTION INFILTRATION; PERINEURAL at 07:12

## 2025-07-10 NOTE — H&P
Diagnosis: Right carpal tunnel syndrome    Procedure: Right endoscopic carpal tunnel release under local anesthesia

## 2025-07-10 NOTE — OP NOTE
OPERATIVE REPORT  PATIENT NAME: Ayad Brambila  :  1940  MRN: [id number]  Pt Location: [facility] MAIN OR    SURGERY DATE: 07/10/25    Surgeons and Role:     * Philip Evans MD - Primary     * Jeremy Martin PA-C - Assisting    Pre-Op Diagnosis:  Carpal tunnel syndrome on right [G56.01]    Post-Op Diagnosis:  Carpal tunnel syndrome on right [G56.01]    Procedure(s) (LRB):  Right endoscopic carpal tunnel release (Right)    Specimen(s):  No specimens collected during this procedure.    Estimated Blood Loss:   Minimal      Anesthesia Type:   Local    Operative Indications:  The patient has a history of Carpal Tunnel Syndrome  right that was recalcitrant to conservative management.  The decision was made to bring the patient to the operating room for Endoscopic Carpal Tunnel Release  right.  Risks of the procedure were explained which include, but are not limited to bleeding; infection; damage to nerves, arteries,veins, tendons; scar; pain; need for reoperation; failure to give desired result; and risks of anaesthesia.  All questions were answered to satisfaction and they were willing to proceed.         Operative Findings:  Right carpal tunnel    Complications:   None    Procedure and Technique:  After the patient, site, and procedure were identified, the patient was brought into the operating room in a supine position.  Local anaesthesia was adminstered in the preoperative holding area.  A tourniquet was not used.  The  right upper extremity was then prepped and drapped in a normal, sterile, orthopedic fashion.    After reconfirmation of the patient, site, and surgical procedure, which was agreed upon by the entire surgical team, attention was turned to the right wrist.  The sites of the proximal and distal incisions were marked.  The uche of the proximal incision was placed horizontally at the midline of the wrist.  The distal incision uche was longitudinal extending distally from the point of intersection of  the line between the long finger and ring finger and the line along the distal border of the fully abducted thumb.  The proximal incision was performed.  Subcutaneous tissues were dissected.  Then the transverse volar antebrachial fascia was perforated with a scalpel.  The edges of the skin incision where retracted and the forearm fascia was incised for approximately 1.5 cm proximally with care taken to identify and protect the median nerve.  Retractors were used to inspect the transverse carpal ligament distally.  A curved Bradford dissector was used to glide under the transverse carpal ligament and superficial to the median nerve with confirmation via the washboard feeling.  Then the curved Bradford was pushed into the palm toward the distal incision site.  When the location of the distal skin uche was adequate, the distal incision was made.  Then with retraction of the skin, further dissection and perforation of the palmar fascia was performed with the use of tenotomy scissors.  The curved Bradford was guided from proximal to distal out the distal incisions without any twisting to allow for dilation of the tract.  The curved Bradford was removed, and the cannula for the camera was inserted along the same tract, making sure to keep the alignment post on the cannula perpendicular to the plane of the hand without twisting.  Then while keeping the wrist in extension, and holding the cannula of the camera in place, the wrist was placed on the hyperextension board.  The scope was inserted distally, and a cotton-tip applicator was used proximally to clean the tract as well as the scope.  A curved cutting knife was introduced from proximal to distal while keeping visualization with the use of the camera.  Without twisting of the canula, the knife was used to cut the transverse carpal ligament completely, making sure there were no remnant fibers.  Then after this was accomplished, the hand was removed from the extension block.  Three  maneuvers were used to confirm the full release of the transverse carpal ligament.  First, the ease of twisting the trocar of the camera confirmed the release of the ligament.  Second, the curved Bradford was introduced to make sure there were no remnant fibers that could be felt palmarly.  Third, the scope was introduced again to visualize that the whole ligament was released proximally to distally.  Additional confirmation of full release included retraction and inspection in the distal and proximal incisions to make sure there were no remnant fibers distally or proximally respectively.       At the completion of the procedure, hemostasis was obtained with cautery and direct pressure.  The wounds were copiously irrigated with sterile solution.  The wounds were closed with Prolene.  Sterile dressings were applied, including Xeroform, gauze, tweeners, webril, ACE.  Please note, all sponge, needle, and instrument counts were correct prior to closure.  Loupe magnification was utilized.   The patient tolerated the procedure well.     I was present for all critical portions of the procedure., A qualified resident physician was not available., and A physician assistant was required during the procedure for retraction, tissue handling, dissection and suturing.    Patient Disposition:  PACU  and hemodynamically stable    SIGNATURE: Philip Evans MD  DATE: 07/10/25  TIME: 7:51 AM

## 2025-07-10 NOTE — DISCHARGE INSTR - AVS FIRST PAGE
Post Operative Instructions    You have had surgery on your arm today, please read and follow the information below:  Elevate your hand above your elbow during the next 24-48 hours to help with swelling.  Place your hand and arm over your head with motion at your shoulder three times a day.  Do not apply any cream/ointment/oil to your incisions including antibiotics.  Do not soak your hands in standing water (dishwater, tubs, Jacuzzi's, pools, etc.) until given permission (typically 2-3 weeks after injury)    Call the office if you notice any:  Increased numbness or tingling of your hand or fingers that is not relieved with elevation.  Increasing pain that is not controlled with medication.  Difficulty chewing, breathing, swallowing.  Chest pains or shortness of breath.  Fever over 101.4 degrees.    Bandage: Remove bandage after 5 days.    Motion: Move fingers into a fist 5 times a day, DO NOT move any splinted fingers.    Weight bearing status: Avoid heavy lifting (>5 pounds) with the extremity that was operated on until follow up appointment. Normal activities of daily living are OK.    Ice: Ice for 10 minutes every hour as needed for swelling x 24 hours.    Sling: No sling necessary.    Medications:   Tylenol Extended Release 650 mg every 8 hours  Tramadol 1 tab every 6 hours AS needed for pain    After surgery, we would like you to take Tylenol 650 mg one tablet by mouth every 8 hours  (at breakfast, lunch and dinner) for 3-5 days after your surgery.  Please take this medication EVERYDAY after surgery for 3-5 days, and not just as needed. Taking this medication after surgery will limit your need for prescription pain medication.      We will also prescribe a narcotic pain medication for a limited time after surgery that you can take as needed for moderate or severe pain.          Follow-up Appointment: 7-10 days.      Please call the office if you have any questions or concerns regarding your post-operative  care.

## 2025-07-11 ENCOUNTER — PATIENT OUTREACH (OUTPATIENT)
Dept: HEMATOLOGY ONCOLOGY | Facility: CLINIC | Age: 85
End: 2025-07-11

## 2025-07-11 NOTE — PROGRESS NOTES
Chart reviewed and Bone scan images were shared.     No path report received. [facility] check on status of my faxed request. No fax received as of yet. I will refax to [phone].

## 2025-07-21 ENCOUNTER — OFFICE VISIT (OUTPATIENT)
Dept: OBGYN CLINIC | Facility: CLINIC | Age: 85
End: 2025-07-21

## 2025-07-21 VITALS — HEIGHT: 68 IN | BODY MASS INDEX: 39.53 KG/M2

## 2025-07-21 DIAGNOSIS — G56.01 CARPAL TUNNEL SYNDROME ON RIGHT: Primary | ICD-10-CM

## 2025-07-21 PROCEDURE — 99024 POSTOP FOLLOW-UP VISIT: CPT | Performed by: ORTHOPAEDIC SURGERY

## 2025-07-21 NOTE — PROGRESS NOTES
"    ORTHOPAEDIC HAND, WRIST, AND ELBOW OFFICE  VISIT     Name: Ayad Brambila      : 1940      MRN: [id number]  Encounter Provider: Philip Evans MD  Encounter Date: 2025   Encounter department: [facility]  :  Assessment & Plan  Carpal tunnel syndrome on right  Right endoscopic carpal tunnel release - Right on 7/10/2025  Patient was advised to avoid soaking for 2 to 3 days to allow the incision to finish healing  Patient was advised that they can have palmar pain for 3 to 4 months after surgery which is normal  They was advised to use heat massage as demonstrated in the office  They will follow-up with us as needed                 History of Present Illness   HPI  Chief Complaint   Patient presents with    Right Wrist - Post-op     ECTR- 7/10/25       SUBJECTIVE:  Ayad Brambila is a 85 y.o. male who presents for follow up after Right endoscopic carpal tunnel release - Right on 7/10/2025.  Today patient has improvement in his numbness and tingling.       PHYSICAL EXAMINATION:  Vital signs: Ht 5' 8\" (1.727 m)   BMI 39.53 kg/m²   General: well developed and well nourished, alert, oriented times 3, and appears comfortable  Psychiatric: Normal    MUSCULOSKELETAL EXAMINATION:  Incision: Clean, dry, intact  Range of Motion: As expected, opposition intact, and full composite fist possible  Neurovascular status: Neuro intact, good cap refill  Activity Restrictions: No restrictions  Done today: Sutures out      STUDIES REVIEWED:  No Studies to review      PROCEDURES PERFORMED:  Procedures  No Procedures performed today    "

## 2025-07-21 NOTE — ASSESSMENT & PLAN NOTE
Right endoscopic carpal tunnel release - Right on 7/10/2025  Patient was advised to avoid soaking for 2 to 3 days to allow the incision to finish healing  Patient was advised that they can have palmar pain for 3 to 4 months after surgery which is normal  They was advised to use heat massage as demonstrated in the office  They will follow-up with us as needed

## 2025-07-23 ENCOUNTER — CONSULT (OUTPATIENT)
Facility: HOSPITAL | Age: 85
End: 2025-07-23
Payer: COMMERCIAL

## 2025-07-23 ENCOUNTER — DOCUMENTATION (OUTPATIENT)
Dept: HEMATOLOGY ONCOLOGY | Facility: CLINIC | Age: 85
End: 2025-07-23

## 2025-07-23 VITALS
TEMPERATURE: 97.2 F | DIASTOLIC BLOOD PRESSURE: 84 MMHG | BODY MASS INDEX: 41.08 KG/M2 | OXYGEN SATURATION: 96 % | RESPIRATION RATE: 18 BRPM | HEART RATE: 73 BPM | SYSTOLIC BLOOD PRESSURE: 138 MMHG | WEIGHT: 270.2 LBS

## 2025-07-23 DIAGNOSIS — C61 PROSTATE CANCER (HCC): Primary | ICD-10-CM

## 2025-07-23 PROCEDURE — 99211 OFF/OP EST MAY X REQ PHY/QHP: CPT | Performed by: RADIOLOGY

## 2025-07-23 PROCEDURE — 99204 OFFICE O/P NEW MOD 45 MIN: CPT | Performed by: RADIOLOGY

## 2025-07-23 NOTE — ASSESSMENT & PLAN NOTE
Mr. Brambila is a 85 year old man with a prior history of prostate cancer (treated at outside hospital, radical prostatectomy and XRT in 2001 per patient report).  He has undergone serial PSA monitoring with slow upward trend.  Most recently, PSA sheila from 0.107 on 12/23/2024 to now 0.140 on 7/7/2025.  He has not yet been evaluated by medical oncology and has regular follow up established with urology.    I reviewed the possible implications of a rising PSA after both radical prostatectomy and adjuvant/salvage radiotherapy.  A rising PSA likely indicates residual micro- or macroscopic disease either in the prostatectomy bed or in regional/distant lymph nodes or metastatic sits.  At this time, areas of residual disease are asymptomatic.  Additional diagnostic studies such as MRI of the pelvic/prostate bed and PSMA PET may identify areas of disease, but are limited at this low PSA value.   If there is a discrete targetable site of disease, either mariah or metastatic site, that is amenable to definitive radiotherapy (such as SBRT), this may delay disease progression.  However, prior XRT records need to be obtained and additional diagnostic studies reviewed before making any recommendation.    Going forward, PSMA PET has been ordered, he will be contacted with results.  He was referred to medical oncology.  He will continue follow up with urology.      He was encouraged to call with any future questions/concerns.    Orders:    Ambulatory referral to Radiation Oncology    Ambulatory Referral to Hematology / Oncology; Future

## 2025-07-23 NOTE — PROGRESS NOTES
Consultation Visit   Name: Ayad Brambila      : 1940      MRN: [id number]  Encounter Provider: Cullen Tamayo MD  Encounter Date: 2025   Encounter department: [facility] RADI[facility]COLOGY  :  Assessment & Plan  Prostate cancer (HCC)  Mr. Brambila is a 85 year old man with a prior history of prostate cancer (treated at outside hospital, radical prostatectomy and XRT in  per patient report).  He has undergone serial PSA monitoring with slow upward trend.  Most recently, PSA sheila from 0.107 on 2024 to now 0.140 on 2025.  He has not yet been evaluated by medical oncology and has regular follow up established with urology.    I reviewed the possible implications of a rising PSA after both radical prostatectomy and adjuvant/salvage radiotherapy.  A rising PSA likely indicates residual micro- or macroscopic disease either in the prostatectomy bed or in regional/distant lymph nodes or metastatic sits.  At this time, areas of residual disease are asymptomatic.  Additional diagnostic studies such as MRI of the pelvic/prostate bed and PSMA PET may identify areas of disease, but are limited at this low PSA value.   If there is a discrete targetable site of disease, either mariah or metastatic site, that is amenable to definitive radiotherapy (such as SBRT), this may delay disease progression.  However, prior XRT records need to be obtained and additional diagnostic studies reviewed before making any recommendation.    Going forward, PSMA PET has been ordered, he will be contacted with results.  He was referred to medical oncology.  He will continue follow up with urology.      He was encouraged to call with any future questions/concerns.    Orders:    Ambulatory referral to Radiation Oncology    Ambulatory Referral to Hematology / Oncology; Future        History of Present Illness   Mr. Brambila is a 85 year old man with the above history.      He was originally diagnosed with  prostate cancer in 2000.  He underwent radical retropubic prostatectomy with bilateral pelvic lymph node dissection on 3/14/01 with Dr. Henning at [facility]. He also had external beam radiation and ADT completed in 2005 (Dr. Ramos, [facility]). He underwent a male sling procedure in 2009, followed by BNC dilation and SPT placement in 2013. He is utilizing [place] catheters since 2018 for recurrent urinary retention. Also uses penile clamp during the day. Patient is not interested in AUS. Most recent PSA on 7/7/25 0.140.            12/23/24 Urology  Hx of prostate cancer diagnosed in 2001  S/p RALP and external beam radiation + ADT (3 sessions)   Detectable but stable and low PSA since 2014   PSA trend:   8/12/2024--0.11  8/28/2023--0.06  8/2/2022--0.08  7/26/2021--0.06  7/16/2020--0.06  1/20/2020--0.08  4/13/2019--0.06  ADT discussed vs repeat referral to radiation team due to biochemical recurrence of prostate cancer -- he wishes to repeat PSA at this time   Next steps will be determined based on repeat PSA value         7/8/25 Urology reviewed PSA from 7/7/25. Continues to rise. Referral to Radiation Oncology          PSA   Latest Ref Rng 0.000 - 4.000 ng/mL   12/23/2024 0.107    4/3/2025 0.106    7/7/2025 0.140        Upon interview, he reports stable urinary symptoms. He denies hematuria or hematochezia.  He has regular bowel movements.  He denies pain.  He is hesitant to resume ADT.  He is without additional acute concerns.    Oncology History     Oncology History   Prostate cancer (HCC)   12/22/2021 Initial Diagnosis    Prostate cancer (HCC)        Review of Systems Refer to nursing note.     Objective   /84   Pulse 73   Temp (!) 97.2 °F (36.2 °C)   Resp 18   Wt 123 kg (270 lb 3.2 oz)   SpO2 96%   BMI 41.08 kg/m²     Pain Screening:  Pain Score: 0-No pain  ECOG  1  Physical Exam  HENT:      Head: Normocephalic and atraumatic.     Eyes:      General: No scleral icterus.     Extraocular Movements: Extraocular  movements intact.       Cardiovascular:      Rate and Rhythm: Normal rate.   Pulmonary:      Effort: Pulmonary effort is normal.   Abdominal:      General: Abdomen is flat. There is no distension.   Genitourinary:     Comments: Deferred    Musculoskeletal:         General: Normal range of motion.      Cervical back: Normal range of motion.     Skin:     General: Skin is warm and dry.     Neurological:      General: No focal deficit present.      Mental Status: He is alert.     Psychiatric:         Mood and Affect: Mood normal.          Radiology Results: I have reviewed radiology images/reports described above.     Administrative Statements   I have spent a total time of 55 minutes in caring for this patient on the day of the visit/encounter including Diagnostic results, Patient and family education, Counseling / Coordination of care, Documenting in the medical record, Reviewing/placing orders in the medical record (including tests, medications, and/or procedures), and Obtaining or reviewing history  .

## 2025-07-23 NOTE — PROGRESS NOTES
Intradepartmental referral from Rad/Onc to Hem/Onc . Reviewed and sent for scheduling.     PN updated for follow up as needed.

## 2025-07-23 NOTE — PROGRESS NOTES
Ayad Brambila 1940 is a 85 y.o. maleWith prostate cancer. He was referred by Urology. He is being seen today for Radiation Oncology consult.      Prostate cancer diagnosed in 2000. He underwent radical retropubic prostatectomy with bilateral pelvic lymph node dissection on 3/14/01 with Dr. Henning at [facility]. He also had external beam radiation and 3 Sessions of ADT completed in 2005. He underwent a male sling procedure in 2009, followed by BNC dilation and SPT placement in 2013. He is utilizing [place] catheters since 2018 for recurrent urinary retention. Also uses penile clamp during the day. Patient is not interested in AUS. Most recent PSA on 7/7/25 0.140.         12/23/24 Urology  Hx of prostate cancer diagnosed in 2001  S/p RALP and external beam radiation + ADT (3 sessions)   Detectable but stable and low PSA since 2014   PSA trend:   8/12/2024--0.11  8/28/2023--0.06  8/2/2022--0.08  7/26/2021--0.06  7/16/2020--0.06  1/20/2020--0.08  4/13/2019--0.06  ADT discussed vs repeat referral to radiation team due to biochemical recurrence of prostate cancer -- he wishes to repeat PSA at this time   Next steps will be determined based on repeat PSA value        7/8/25 Urology reviewed PSA from 7/7/25. Continues to rise. Referral to Radiation Oncology       PSA   Latest Ref Rng 0.000 - 4.000 ng/mL   12/23/2024 0.107    4/3/2025 0.106    7/7/2025 0.140              Oncology History   Prostate cancer (HCC)   12/22/2021 Initial Diagnosis    Prostate cancer (HCC)         Review of Systems:  Review of Systems   Constitutional: Negative.    HENT:  Positive for dental problem (dental implants).    Eyes:         Wears glasses   Respiratory:  Positive for cough (occasional).    Cardiovascular:  Positive for chest pain (sometimes with cough. Denies at visit).   Gastrointestinal:  Positive for diarrhea (intermittently).   Endocrine: Negative.    Genitourinary:  Negative for dysuria.        Wears a male external catheter all the  time. Reports he empties bag 8-10 times day. Alternates with penile clamp. Reports constant dribbling. Wears pads when has clamp on. Uses 2-3 pads when clamp is on.    Musculoskeletal:  Positive for arthralgias and back pain.   Skin: Negative.    Allergic/Immunologic: Negative.    Neurological:  Positive for dizziness (occasionally) and light-headedness (occasionally).   Hematological: Negative.    Psychiatric/Behavioral:  The patient is nervous/anxious.        IPSS Questionnaire (AUA-7):  Over the past month…    1)  How often have you had a sensation of not emptying your bladder completely after you finish urinating?  0 - Not at all   2)  How often have you had to urinate again less than two hours after you finished urinating? 0 - Not at all   3)  How often have you found you stopped and started again several times when you urinated?  0 - Not at all   4) How difficult have you found it to postpone urination?  0 - Not at all   5) How often have you had a weak urinary stream?  0 - Not at all   6) How often have you had to push or strain to begin urination?  0 - Not at all   7) How many times did you most typically get up to urinate from the time you went to bed until the time you got up in the morning?  0 - None   Total Score:  0         Prior Radiation: Prostate Radiation at [facility] in summer of 2001    History of autoimmune or connective tissue disorder no    MST done    Implantable Devices (Port, pacemaker, pain stimulator) no     Hip Replacement no     Health Maintenance   Topic Date Due    Zoster Vaccine (1 of 2) Never done    RSV Vaccine for Pregnant Patients and Patients Age 60+ Years (1 - 1-dose 75+ series) Never done    BMI: Followup Plan  06/21/2022    COVID-19 Vaccine (7 - 2024-25 season) 05/05/2025    Influenza Vaccine (1) 09/01/2025    Fall Risk  09/18/2025    Medicare Annual Wellness Visit (AWV)  09/18/2025    BMI: Adult  07/10/2026    Depression Screening  07/23/2026    Pneumococcal Vaccine: 50+ Years   Completed    Meningococcal B Vaccine  Aged Out    RSV Vaccine age 0-20 Months  Aged Out    HIB Vaccine  Aged Out    IPV Vaccine  Aged Out    Hepatitis A Vaccine  Aged Out    Meningococcal ACWY Vaccine  Aged Out    HPV Vaccine  Aged Out       Past Medical History[1]    Past Surgical History[2]    Family History[3]    Social History[4]     Current Medications[5]    Allergies[6]     Vitals:    07/23/25 0902   BP: 138/84   Pulse: 73   Resp: 18   Temp: (!) 97.2 °F (36.2 °C)   SpO2: 96%   Weight: 123 kg (270 lb 3.2 oz)       Pain Score: 0-No pain       [1]   Past Medical History:  Diagnosis Date    Arthritis     Bladder neck contracture     Cancer (HCC)     ED (erectile dysfunction)     Frequency of urination     Hypercholesterolemia     Hypertension     12/13/17    Nocturia     PONV (postoperative nausea and vomiting)     Prostate cancer (HCC)     12/8/16    Stress incontinence    [2]   Past Surgical History:  Procedure Laterality Date    BLADDER SURGERY      Bladder Neck Restriction, SP Catheter    CARPAL TUNNEL RELEASE      CHOLECYSTECTOMY      CYSTOSTOMY W/ BLADDER DILATION      VT ARTHRS KNE SURG W/MENISCECTOMY MED/LAT W/SHVG Right 9/6/2024    Procedure: ARTHROSCOPY KNEE WITH PARTIAL MEDIAL AND LATERAL MENISCECTOMIES, Resection of Interomedial Plica;  Surgeon: Hermelinda Avila MD;  Location: [facility] OR;  Service: Orthopedics    VT NDSC WRST SURG W/RLS TRANSVRS CARPL LIGM Right 7/10/2025    Procedure: Right endoscopic carpal tunnel release;  Surgeon: Philip Evans MD;  Location: [facility];  Service: Orthopedics    PROSTATECTOMY      Radical    URETHRAL SLING     [3]   Family History  Problem Relation Name Age of Onset    Coronary artery disease Father          involving native coronary artery of native heart, angina presence unspecified   [4]   Social History  Tobacco Use    Smoking status: Former     Current packs/day: 0.00     Average packs/day: 1.5 packs/day for 12.0 years (18.0 ttl pk-yrs)     Types: Cigarettes      Start date:      Quit date:      Years since quittin.5     Passive exposure: Past    Smokeless tobacco: Never   Vaping Use    Vaping status: Never Used   Substance Use Topics    Alcohol use: No    Drug use: No   [5]   Current Outpatient Medications:     acetaminophen (TYLENOL) 650 mg CR tablet, Take 1 tablet (650 mg total) by mouth every 8 (eight) hours as needed for mild pain, Disp: 30 tablet, Rfl: 0    lisinopril (ZESTRIL) 5 mg tablet, TAKE ONE BY MOUTH DAILY, Disp: 90 tablet, Rfl: 1    Xarelto 20 MG tablet, TAKE ONE TABLET (20 MG TOTAL) BY MOUTH DAILY WITH BREAKFAST, Disp: 90 tablet, Rfl: 1    traMADol (Ultram) 50 mg tablet, Take 1 tablet (50 mg total) by mouth every 6 (six) hours as needed for moderate pain (Patient not taking: Reported on 2025), Disp: 5 tablet, Rfl: 0  [6] No Known Allergies

## 2025-08-04 ENCOUNTER — HOSPITAL ENCOUNTER (OUTPATIENT)
Dept: RADIOLOGY | Age: 85
Discharge: HOME | End: 2025-08-04
Attending: RADIOLOGY
Payer: COMMERCIAL

## 2025-08-04 DIAGNOSIS — C61 PROSTATE CANCER (HCC): ICD-10-CM

## 2025-08-04 DIAGNOSIS — R97.21 RISING PSA FOLLOWING TREATMENT FOR MALIGNANT NEOPLASM OF PROSTATE: ICD-10-CM

## 2025-08-04 PROCEDURE — A9596 HB ILLUCCIX - GA 68 PSMA -11, PER MCI: HCPCS

## 2025-08-04 PROCEDURE — 78815 PET IMAGE W/CT SKULL-THIGH: CPT

## 2025-08-06 ENCOUNTER — TELEPHONE (OUTPATIENT)
Age: 85
End: 2025-08-06

## 2025-08-06 ENCOUNTER — CONSULT (OUTPATIENT)
Dept: HEMATOLOGY ONCOLOGY | Facility: CLINIC | Age: 85
End: 2025-08-06
Attending: RADIOLOGY
Payer: COMMERCIAL

## 2025-08-06 ENCOUNTER — DOCUMENTATION (OUTPATIENT)
Dept: HEMATOLOGY ONCOLOGY | Facility: CLINIC | Age: 85
End: 2025-08-06

## 2025-08-06 VITALS
HEIGHT: 68 IN | TEMPERATURE: 97.1 F | OXYGEN SATURATION: 96 % | DIASTOLIC BLOOD PRESSURE: 64 MMHG | SYSTOLIC BLOOD PRESSURE: 132 MMHG | RESPIRATION RATE: 16 BRPM | WEIGHT: 271.5 LBS | HEART RATE: 59 BPM | BODY MASS INDEX: 41.15 KG/M2

## 2025-08-06 DIAGNOSIS — C61 PROSTATE CANCER (HCC): Primary | ICD-10-CM

## 2025-08-06 PROCEDURE — 99205 OFFICE O/P NEW HI 60 MIN: CPT | Performed by: INTERNAL MEDICINE

## 2025-08-06 RX ORDER — RELUGOLIX 120 MG/1
120 TABLET, FILM COATED ORAL DAILY
Qty: 30 TABLET | Refills: 5 | Status: SHIPPED | OUTPATIENT
Start: 2025-08-06

## 2025-08-06 RX ORDER — RELUGOLIX 120 MG/1
360 TABLET, FILM COATED ORAL DAILY
Qty: 30 TABLET | Refills: 0 | Status: SHIPPED | OUTPATIENT
Start: 2025-08-06

## 2025-08-07 ENCOUNTER — TELEPHONE (OUTPATIENT)
Dept: HEMATOLOGY ONCOLOGY | Facility: CLINIC | Age: 85
End: 2025-08-07

## 2025-08-08 ENCOUNTER — PATIENT OUTREACH (OUTPATIENT)
Dept: HEMATOLOGY ONCOLOGY | Facility: CLINIC | Age: 85
End: 2025-08-08

## 2025-08-08 ENCOUNTER — TELEPHONE (OUTPATIENT)
Age: 85
End: 2025-08-08

## 2025-08-11 ENCOUNTER — TELEPHONE (OUTPATIENT)
Age: 85
End: 2025-08-11

## 2025-08-15 ENCOUNTER — PATIENT OUTREACH (OUTPATIENT)
Dept: HEMATOLOGY ONCOLOGY | Facility: CLINIC | Age: 85
End: 2025-08-15

## 2025-08-18 ENCOUNTER — PATIENT OUTREACH (OUTPATIENT)
Dept: CASE MANAGEMENT | Facility: OTHER | Age: 85
End: 2025-08-18

## 2025-08-19 ENCOUNTER — PATIENT OUTREACH (OUTPATIENT)
Dept: CASE MANAGEMENT | Facility: OTHER | Age: 85
End: 2025-08-19

## (undated) DEVICE — IMPERVIOUS STOCKINETTE: Brand: DEROYAL

## (undated) DEVICE — BETHLEHEM UNIVERSAL  ARTHRO PK: Brand: CARDINAL HEALTH

## (undated) DEVICE — GLOVE INDICATOR PI UNDERGLOVE SZ 8 BLUE

## (undated) DEVICE — GLOVE INDICATOR PI UNDERGLOVE SZ 7 BLUE

## (undated) DEVICE — INTENDED FOR TISSUE SEPARATION, AND OTHER PROCEDURES THAT REQUIRE A SHARP SURGICAL BLADE TO PUNCTURE OR CUT.: Brand: BARD-PARKER SAFETY BLADES SIZE 11, STERILE

## (undated) DEVICE — SUT ETHILON 3-0 PS-1 18 IN 1663H

## (undated) DEVICE — INTENDED FOR TISSUE SEPARATION, AND OTHER PROCEDURES THAT REQUIRE A SHARP SURGICAL BLADE TO PUNCTURE OR CUT.: Brand: BARD-PARKER ® CARBON RIB-BACK BLADES

## (undated) DEVICE — WET SKIN PREP TRAY: Brand: MEDLINE INDUSTRIES, INC.

## (undated) DEVICE — GLOVE SRG BIOGEL 7.5

## (undated) DEVICE — SYRINGE 30ML LL

## (undated) DEVICE — GLOVE SRG BIOGEL 7

## (undated) DEVICE — Device

## (undated) DEVICE — PAD CAST 6 IN COTTON NON STERILE

## (undated) DEVICE — OCCLUSIVE GAUZE STRIP,3% BISMUTH TRIBROMOPHENATE IN PETROLATUM BLEND: Brand: XEROFORM

## (undated) DEVICE — BLADE SHAVER DISSECTOR 3.5MM 13CM COOLCUT

## (undated) DEVICE — CAST PADDING 6 IN STERILE

## (undated) DEVICE — FILTER STRAW 1.7

## (undated) DEVICE — ACE WRAP 6 IN STERILE

## (undated) DEVICE — APPLICATOR 6IN COTTON TIP STRL SINGLE/PK

## (undated) DEVICE — GLOVE SRG BIOGEL ORTHOPEDIC 7.5

## (undated) DEVICE — TUBING ARTHROSCOPIC WAVE  MAIN PUMP

## (undated) DEVICE — NEEDLE 18 G X 1 1/2

## (undated) DEVICE — BLADE SHAVER EXCALIBUR 4MM 13CM COOLCUT

## (undated) DEVICE — GAUZE SPONGES,16 PLY: Brand: CURITY

## (undated) DEVICE — ACE WRAP 6 IN UNSTERILE

## (undated) DEVICE — STERILE BETHLEHEM PLASTIC HAND: Brand: CARDINAL HEALTH

## (undated) DEVICE — RETROGRADE KNIFE BOX OF 6: Brand: ECTRA

## (undated) DEVICE — SYRINGE 3ML LL

## (undated) DEVICE — TUBING SUCTION 5MM X 12 FT

## (undated) DEVICE — NEEDLE HYPO 23G X 1-1/2 IN